# Patient Record
Sex: MALE | Race: WHITE | HISPANIC OR LATINO | Employment: OTHER | URBAN - METROPOLITAN AREA
[De-identification: names, ages, dates, MRNs, and addresses within clinical notes are randomized per-mention and may not be internally consistent; named-entity substitution may affect disease eponyms.]

---

## 2023-02-07 PROBLEM — R63.5 ABNORMAL WEIGHT GAIN: Status: ACTIVE | Noted: 2023-02-07

## 2023-02-07 PROBLEM — I10 HYPERTENSION: Status: ACTIVE | Noted: 2023-02-07

## 2023-02-07 PROBLEM — E11.9 TYPE 2 DIABETES MELLITUS WITHOUT COMPLICATION, WITHOUT LONG-TERM CURRENT USE OF INSULIN (MULTI): Status: ACTIVE | Noted: 2023-02-07

## 2023-02-07 PROBLEM — R32 INCONTINENCE: Status: ACTIVE | Noted: 2023-02-07

## 2023-02-07 PROBLEM — I48.0 PAROXYSMAL ATRIAL FIBRILLATION WITH RVR (MULTI): Status: ACTIVE | Noted: 2023-02-07

## 2023-02-07 PROBLEM — R82.89 ABNORMAL URINE CYTOLOGY: Status: ACTIVE | Noted: 2023-02-07

## 2023-02-07 PROBLEM — E78.00 HYPERCHOLESTEROLEMIA: Status: ACTIVE | Noted: 2023-02-07

## 2023-02-07 PROBLEM — M54.50 ACUTE LEFT-SIDED LOW BACK PAIN WITHOUT SCIATICA: Status: ACTIVE | Noted: 2023-02-07

## 2023-02-07 PROBLEM — N18.30 CONTROLLED TYPE 2 DIABETES MELLITUS WITH STAGE 3 CHRONIC KIDNEY DISEASE (MULTI): Status: ACTIVE | Noted: 2023-02-07

## 2023-02-07 PROBLEM — J20.8 ACUTE BRONCHITIS DUE TO OTHER SPECIFIED ORGANISMS: Status: ACTIVE | Noted: 2023-02-07

## 2023-02-07 PROBLEM — D64.9 NORMOCHROMIC NORMOCYTIC ANEMIA: Status: ACTIVE | Noted: 2023-02-07

## 2023-02-07 PROBLEM — E11.22 CONTROLLED TYPE 2 DIABETES MELLITUS WITH STAGE 3 CHRONIC KIDNEY DISEASE (MULTI): Status: ACTIVE | Noted: 2023-02-07

## 2023-02-07 RX ORDER — DILTIAZEM HYDROCHLORIDE 300 MG/1
1 CAPSULE, EXTENDED RELEASE ORAL DAILY
COMMUNITY
Start: 2021-02-12

## 2023-02-07 RX ORDER — LISINOPRIL 5 MG/1
1 TABLET ORAL DAILY
COMMUNITY
Start: 2019-06-19 | End: 2023-11-02 | Stop reason: SDUPTHER

## 2023-02-07 RX ORDER — SIMVASTATIN 20 MG/1
1 TABLET, FILM COATED ORAL DAILY
COMMUNITY
Start: 2020-07-10 | End: 2023-05-02

## 2023-02-07 RX ORDER — OXYBUTYNIN CHLORIDE 5 MG/1
5 TABLET ORAL NIGHTLY
COMMUNITY
Start: 2022-07-05

## 2023-02-07 RX ORDER — OMEPRAZOLE 40 MG/1
CAPSULE, DELAYED RELEASE ORAL
COMMUNITY

## 2023-02-07 RX ORDER — TERAZOSIN 1 MG/1
1 CAPSULE ORAL NIGHTLY
COMMUNITY
Start: 2021-02-03 | End: 2023-12-29 | Stop reason: DRUGHIGH

## 2023-02-07 RX ORDER — METOPROLOL TARTRATE 50 MG/1
1 TABLET ORAL 2 TIMES DAILY
COMMUNITY
Start: 2022-07-28 | End: 2023-05-31

## 2023-02-07 RX ORDER — SPIRONOLACTONE 25 MG/1
1 TABLET ORAL DAILY
COMMUNITY
Start: 2017-02-07 | End: 2023-08-23

## 2023-02-07 RX ORDER — GLIPIZIDE 2.5 MG/1
2.5 TABLET, EXTENDED RELEASE ORAL
COMMUNITY
Start: 2019-09-27 | End: 2023-05-31

## 2023-02-07 RX ORDER — METFORMIN HYDROCHLORIDE 1000 MG/1
1 TABLET ORAL 2 TIMES DAILY
COMMUNITY
Start: 2021-11-09 | End: 2023-08-23

## 2023-02-07 RX ORDER — TROSPIUM CHLORIDE ER 60 MG/1
1 CAPSULE ORAL DAILY
COMMUNITY
Start: 2021-02-03 | End: 2023-04-23 | Stop reason: ALTCHOICE

## 2023-02-07 RX ORDER — TAMSULOSIN HYDROCHLORIDE 0.4 MG/1
1 CAPSULE ORAL DAILY
COMMUNITY
Start: 2021-02-03 | End: 2023-03-23

## 2023-03-20 ENCOUNTER — OFFICE VISIT (OUTPATIENT)
Dept: PRIMARY CARE | Facility: CLINIC | Age: 68
End: 2023-03-20
Payer: MEDICARE

## 2023-03-20 VITALS
SYSTOLIC BLOOD PRESSURE: 118 MMHG | BODY MASS INDEX: 39.37 KG/M2 | RESPIRATION RATE: 16 BRPM | WEIGHT: 245 LBS | HEIGHT: 66 IN | TEMPERATURE: 96.2 F | HEART RATE: 62 BPM | OXYGEN SATURATION: 97 % | DIASTOLIC BLOOD PRESSURE: 70 MMHG

## 2023-03-20 DIAGNOSIS — I48.0 PAROXYSMAL ATRIAL FIBRILLATION WITH RVR (MULTI): ICD-10-CM

## 2023-03-20 DIAGNOSIS — R20.2 TINGLING OF BOTH FEET: ICD-10-CM

## 2023-03-20 DIAGNOSIS — N18.30 CONTROLLED TYPE 2 DIABETES MELLITUS WITH STAGE 3 CHRONIC KIDNEY DISEASE, WITHOUT LONG-TERM CURRENT USE OF INSULIN (MULTI): Primary | ICD-10-CM

## 2023-03-20 DIAGNOSIS — E78.00 HYPERCHOLESTEROLEMIA: ICD-10-CM

## 2023-03-20 DIAGNOSIS — E11.22 CONTROLLED TYPE 2 DIABETES MELLITUS WITH STAGE 3 CHRONIC KIDNEY DISEASE, WITHOUT LONG-TERM CURRENT USE OF INSULIN (MULTI): Primary | ICD-10-CM

## 2023-03-20 DIAGNOSIS — I10 PRIMARY HYPERTENSION: ICD-10-CM

## 2023-03-20 PROCEDURE — 1160F RVW MEDS BY RX/DR IN RCRD: CPT | Performed by: FAMILY MEDICINE

## 2023-03-20 PROCEDURE — 1159F MED LIST DOCD IN RCRD: CPT | Performed by: FAMILY MEDICINE

## 2023-03-20 PROCEDURE — 3074F SYST BP LT 130 MM HG: CPT | Performed by: FAMILY MEDICINE

## 2023-03-20 PROCEDURE — 4010F ACE/ARB THERAPY RXD/TAKEN: CPT | Performed by: FAMILY MEDICINE

## 2023-03-20 PROCEDURE — 1036F TOBACCO NON-USER: CPT | Performed by: FAMILY MEDICINE

## 2023-03-20 PROCEDURE — 3078F DIAST BP <80 MM HG: CPT | Performed by: FAMILY MEDICINE

## 2023-03-20 PROCEDURE — 99214 OFFICE O/P EST MOD 30 MIN: CPT | Performed by: FAMILY MEDICINE

## 2023-03-20 NOTE — PROGRESS NOTES
Subjective   Patient ID: Johnathan Akins is a 68 y.o. male who presents for Diabetes (Follow up).  HPI  Pleasant 68-year-old gent with a history of paroxysmal atrial fibrillation hypertension very well on Xarelto as well as his diltiazem and metoprolol.  Remains also on Aldactone because prior gout for peripheral edema is done very well also takes metformin twice a day as well as 3 times a day 2.5 mg of glipizide.  Otherwise his diet is been fairly good improved his low-salt low carbohydrate and low-fat diet.  Follow-up in cardiology and otherwise has some tingling in his feet we did go ahead and update this metabolic work-up with B12 and TSH in addition to checking his CMP and A1c before next visit in 3 months.  The patient is trying to lose weight.  He is aware of his controlled A1c and normal CMP otherwise.  Is been stable PSAs been stable is also followed by urologist who has him on Hytrin and also Butin at nighttime to help with bladder spasm and nocturia.  Otherwise chronic meds reviewed no reported side effects  No recent chest pain shortness of palpitations visual change foot change or new GI- issues.  Reviewed medicines with spouse as well as patient today as well as need for ongoing low-salt low carbohydrate and low-fat diet also takes statin therapy  Review of Systems   Constitutional:  Negative for appetite change and fatigue.   Eyes:  Negative for visual disturbance.   Respiratory:  Negative for cough, chest tightness and shortness of breath.    Cardiovascular:  Negative for chest pain, palpitations and leg swelling.   Gastrointestinal:  Negative for abdominal pain, blood in stool and vomiting.   Genitourinary:  Positive for frequency. Negative for dysuria, flank pain, hematuria and testicular pain.   Musculoskeletal:  Positive for arthralgias and back pain.   Skin:  Negative for rash.   Neurological:  Positive for numbness. Negative for speech difficulty, weakness and headaches.  "  Psychiatric/Behavioral:  Negative for behavioral problems, decreased concentration and sleep disturbance.        Objective   /70 (BP Location: Right arm, Patient Position: Sitting, BP Cuff Size: Large adult)   Pulse 62   Temp 35.7 °C (96.2 °F) (Temporal)   Resp 16   Ht 1.676 m (5' 6\")   Wt 111 kg (245 lb)   SpO2 97%   BMI 39.54 kg/m²   02/06/23 1824Prostate Specific Antigen   Collected: 02/06/23 1112  Final result   PSA 0.50 ng/mL      02/06/23 1725Cholesterol, LDL Direct   Collected: 02/06/23 1112  Final result   LDL Direct 89 mg/dL      02/06/23 1602Albumin , Urine Random   Collected: 02/06/23 1112  Final result   ALBUMIN (MG/L) IN URINE <7.0 mg/L Creatinine, Urine 93.6 mg/dL   Albumin/Creatine Ratio SEE COMMENT ug/mg crt      02/06/23 1534Comprehensive Metabolic Panel   Collected: 02/06/23 1112  Final result   Glucose 80 mg/dL GFR MALE 61 mL/min/1.73m2    Sodium 132 Low  mmol/L Calcium 9.4 mg/dL   Potassium 4.9 mmol/L Albumin 3.7 g/dL   Chloride 100 mmol/L Alkaline Phosphatase 74 U/L   Bicarbonate 24 mmol/L Total Protein 7.1 g/dL   Anion Gap 13 mmol/L AST 12 U/L   Urea Nitrogen 19 mg/dL Total Bilirubin 0.5 mg/dL   Creatinine 1.26 mg/dL ALT (SGPT) 11 U/L    02/06/23 1452Urinalysis with Reflex Microscopic   Collected: 02/06/23 1112  Final result   Color, Urine YELLOW Blood, Urine NEGATIVE   Appearance, Urine CLEAR Ketones, Urine NEGATIVE mg/dL   Specific Gravity, Urine 1.016 Bilirubin, Urine NEGATIVE   pH, Urine 6.0 Urobilinogen, Urine <2.0 mg/dL   Protein, Urine NEGATIVE mg/dL Nitrite, Urine NEGATIVE   Glucose, Urine NEGATIVE mg/dL Leukocyte Esterase, Urine NEGATIVE   02/06/23 1423URINE SPECIMEN TRACKING TO CYTOLOGY OR SURG PATH - DATA CONVN   Collected: 02/06/23 1112  Final result   Urine Specimen Tracking to Cytology or Surg Path Collected     07/12/22 1435Urinalysis with Reflex Microscopic   Collected: 07/12/22 1051  Final result   Color, Urine YELLOW Blood, Urine NEGATIVE   Appearance, " Urine CLEAR Ketones, Urine NEGATIVE mg/dL   Specific Gravity, Urine 1.017 Bilirubin, Urine NEGATIVE   pH, Urine 5.0 Urobilinogen, Urine <2.0 mg/dL   Protein, Urine NEGATIVE mg/dL Nitrite, Urine NEGATIVE   Glucose, Urine NEGATIVE mg/dL Leukocyte Esterase, Urine NEGATIVE   07/12/22 1051URINE SPECIMEN TRACKING TO CYTOLOGY OR SURG PATH - DATA CONVN   Collected: 07/12/22 1051  Final result   Urine Specimen Tracking to Cytology or Surg Path Collected     04/08/22 1226Hemoglobin A1C   Collected: 04/08/22 0845  Final result   Hemoglobin A1C 6.2 Abnormal  %  Estimated Average Glucose 131 MG/DL   04/08/22 0927Basic Metabolic Panel   Collected: 04/08/22 0845  Final result   Glucose 121 High  mg/dL Anion Gap 14 mmol/L   Sodium 134 Low  mmol/L Urea Nitrogen 24 High  mg/dL   Potassium 5.0 mmol/L Creatinine 1.24 mg/dL   Chloride 102 mmol/L GFR MALE 63 mL/min/1.73m2    Bicarbonate 23 mmol/L Calcium 9.3 mg/dL           Physical Exam  Vital signs reviewed and normal  Eyes eyes show PERRLA bilaterally muscle tracking is normal no parable edema  Neck neck is supple no masses adenopathy bruits or rigidity thyroid is normal  Cardiovascular RSR without significant murmurs gallop or ectopy  Respiratory-clear anteriorly and posteriorly  Abdominal no organomegaly mass or rebound bowel sounds are normal no CVA tenderness  Peripheral vascular no symmetric or asymmetric edema minimally reduced sensation to vibration but otherwise sensation to touch is intact deficits noted  Musculoskeletal moves upper and lower extremities well without acute joint swelling or muscle tenderness  Skin-no rash petechiae or jaundice  Mood-mood is stable without anxiety depressive or cognitive issues  Accu-Cheks reviewed recent sugars normal as well as remaining CMP earlier PSA and PSA were stable.      Assessment/Plan   Problem List Items Addressed This Visit    None  Above lab.  We will continue above cardiac lipid as well as diabetic medicines continue  low-salt low carbohydrate and low-fat diet  We will come in in about 3 months during his Accu-Cheks and also check previsit B12 TSH in view of burning feet.  We will also check A1c and fasting CMP.  All question were addressed for did review diabetic goals diabetic diet compliance.  Otherwise clinically stable from a cardiopulmonary standpoint we will continue his Xarelto and his beta-blocker and diltiazem follow-up with cardiology at their direction  continue current medications~  diet- decrease carbohydrates and sugar intake~  increase exercise as tolerated~  stay up to date with eye and foot exams~  call if any questions or concerns~follow up in 3-6 months~   @discharge  The above diagnosis and treatment plan was discussed with the patient patient will continue appropriate diet and exercise as reviewed  Patient will recheck earlier if any interval problems of significance or clinical worsening of the above problems.  Agrees above surveillance.  All question were addressed regarding above meds

## 2023-03-23 PROBLEM — R20.2 TINGLING OF BOTH FEET: Status: ACTIVE | Noted: 2023-03-23

## 2023-03-23 PROBLEM — J20.8 ACUTE BRONCHITIS DUE TO OTHER SPECIFIED ORGANISMS: Status: RESOLVED | Noted: 2023-02-07 | Resolved: 2023-03-23

## 2023-03-23 ASSESSMENT — ENCOUNTER SYMPTOMS
NUMBNESS: 1
BACK PAIN: 1
HEADACHES: 0
BLOOD IN STOOL: 0
FREQUENCY: 1
SPEECH DIFFICULTY: 0
ARTHRALGIAS: 1
FLANK PAIN: 0
APPETITE CHANGE: 0
SLEEP DISTURBANCE: 0
CHEST TIGHTNESS: 0
DYSURIA: 0
WEAKNESS: 0
ABDOMINAL PAIN: 0
COUGH: 0
DECREASED CONCENTRATION: 0
PALPITATIONS: 0
SHORTNESS OF BREATH: 0
HEMATURIA: 0
VOMITING: 0
FATIGUE: 0

## 2023-03-29 LAB
CREATININE (MG/DL) IN SER/PLAS: 1.2 MG/DL (ref 0.5–1.3)
GFR MALE: 65 ML/MIN/1.73M2

## 2023-04-18 ENCOUNTER — LAB (OUTPATIENT)
Dept: LAB | Facility: LAB | Age: 68
End: 2023-04-18
Payer: MEDICARE

## 2023-04-18 DIAGNOSIS — I48.0 PAROXYSMAL ATRIAL FIBRILLATION WITH RVR (MULTI): ICD-10-CM

## 2023-04-18 DIAGNOSIS — R20.2 TINGLING OF BOTH FEET: ICD-10-CM

## 2023-04-18 DIAGNOSIS — E11.22 CONTROLLED TYPE 2 DIABETES MELLITUS WITH STAGE 3 CHRONIC KIDNEY DISEASE, WITHOUT LONG-TERM CURRENT USE OF INSULIN (MULTI): ICD-10-CM

## 2023-04-18 DIAGNOSIS — N18.30 CONTROLLED TYPE 2 DIABETES MELLITUS WITH STAGE 3 CHRONIC KIDNEY DISEASE, WITHOUT LONG-TERM CURRENT USE OF INSULIN (MULTI): ICD-10-CM

## 2023-04-18 LAB
ALANINE AMINOTRANSFERASE (SGPT) (U/L) IN SER/PLAS: 11 U/L (ref 10–52)
ALBUMIN (G/DL) IN SER/PLAS: 4 G/DL (ref 3.4–5)
ALKALINE PHOSPHATASE (U/L) IN SER/PLAS: 85 U/L (ref 33–136)
ANION GAP IN SER/PLAS: 13 MMOL/L (ref 10–20)
ASPARTATE AMINOTRANSFERASE (SGOT) (U/L) IN SER/PLAS: 10 U/L (ref 9–39)
BILIRUBIN TOTAL (MG/DL) IN SER/PLAS: 0.4 MG/DL (ref 0–1.2)
CALCIUM (MG/DL) IN SER/PLAS: 9.4 MG/DL (ref 8.6–10.3)
CARBON DIOXIDE, TOTAL (MMOL/L) IN SER/PLAS: 25 MMOL/L (ref 21–32)
CHLORIDE (MMOL/L) IN SER/PLAS: 102 MMOL/L (ref 98–107)
COBALAMIN (VITAMIN B12) (PG/ML) IN SER/PLAS: 238 PG/ML (ref 211–911)
CREATININE (MG/DL) IN SER/PLAS: 1.18 MG/DL (ref 0.5–1.3)
ESTIMATED AVERAGE GLUCOSE FOR HBA1C: 137 MG/DL
GFR MALE: 66 ML/MIN/1.73M2
GLUCOSE (MG/DL) IN SER/PLAS: 102 MG/DL (ref 74–99)
HEMOGLOBIN A1C/HEMOGLOBIN TOTAL IN BLOOD: 6.4 %
POTASSIUM (MMOL/L) IN SER/PLAS: 5.1 MMOL/L (ref 3.5–5.3)
PROTEIN TOTAL: 6.7 G/DL (ref 6.4–8.2)
SODIUM (MMOL/L) IN SER/PLAS: 135 MMOL/L (ref 136–145)
THYROTROPIN (MIU/L) IN SER/PLAS BY DETECTION LIMIT <= 0.05 MIU/L: 1.83 MIU/L (ref 0.44–3.98)
UREA NITROGEN (MG/DL) IN SER/PLAS: 17 MG/DL (ref 6–23)

## 2023-04-18 PROCEDURE — 36415 COLL VENOUS BLD VENIPUNCTURE: CPT

## 2023-04-18 PROCEDURE — 84443 ASSAY THYROID STIM HORMONE: CPT

## 2023-04-18 PROCEDURE — 83036 HEMOGLOBIN GLYCOSYLATED A1C: CPT

## 2023-04-18 PROCEDURE — 80053 COMPREHEN METABOLIC PANEL: CPT

## 2023-04-18 PROCEDURE — 82607 VITAMIN B-12: CPT

## 2023-04-19 ENCOUNTER — OFFICE VISIT (OUTPATIENT)
Dept: PRIMARY CARE | Facility: CLINIC | Age: 68
End: 2023-04-19
Payer: MEDICARE

## 2023-04-19 VITALS
HEIGHT: 66 IN | RESPIRATION RATE: 16 BRPM | OXYGEN SATURATION: 97 % | HEART RATE: 62 BPM | SYSTOLIC BLOOD PRESSURE: 118 MMHG | BODY MASS INDEX: 39.21 KG/M2 | WEIGHT: 244 LBS | DIASTOLIC BLOOD PRESSURE: 62 MMHG | TEMPERATURE: 97.3 F

## 2023-04-19 DIAGNOSIS — E78.00 HYPERCHOLESTEROLEMIA: ICD-10-CM

## 2023-04-19 DIAGNOSIS — I10 PRIMARY HYPERTENSION: ICD-10-CM

## 2023-04-19 DIAGNOSIS — E11.22 CONTROLLED TYPE 2 DIABETES MELLITUS WITH STAGE 3 CHRONIC KIDNEY DISEASE, WITHOUT LONG-TERM CURRENT USE OF INSULIN (MULTI): Primary | ICD-10-CM

## 2023-04-19 DIAGNOSIS — I48.0 PAROXYSMAL ATRIAL FIBRILLATION WITH RVR (MULTI): ICD-10-CM

## 2023-04-19 DIAGNOSIS — N18.30 CONTROLLED TYPE 2 DIABETES MELLITUS WITH STAGE 3 CHRONIC KIDNEY DISEASE, WITHOUT LONG-TERM CURRENT USE OF INSULIN (MULTI): Primary | ICD-10-CM

## 2023-04-19 PROCEDURE — 1159F MED LIST DOCD IN RCRD: CPT | Performed by: FAMILY MEDICINE

## 2023-04-19 PROCEDURE — 3078F DIAST BP <80 MM HG: CPT | Performed by: FAMILY MEDICINE

## 2023-04-19 PROCEDURE — 3044F HG A1C LEVEL LT 7.0%: CPT | Performed by: FAMILY MEDICINE

## 2023-04-19 PROCEDURE — 3074F SYST BP LT 130 MM HG: CPT | Performed by: FAMILY MEDICINE

## 2023-04-19 PROCEDURE — 1160F RVW MEDS BY RX/DR IN RCRD: CPT | Performed by: FAMILY MEDICINE

## 2023-04-19 PROCEDURE — 1036F TOBACCO NON-USER: CPT | Performed by: FAMILY MEDICINE

## 2023-04-19 PROCEDURE — 99213 OFFICE O/P EST LOW 20 MIN: CPT | Performed by: FAMILY MEDICINE

## 2023-04-19 PROCEDURE — 4010F ACE/ARB THERAPY RXD/TAKEN: CPT | Performed by: FAMILY MEDICINE

## 2023-04-19 ASSESSMENT — PATIENT HEALTH QUESTIONNAIRE - PHQ9
2. FEELING DOWN, DEPRESSED OR HOPELESS: NOT AT ALL
SUM OF ALL RESPONSES TO PHQ9 QUESTIONS 1 AND 2: 0
1. LITTLE INTEREST OR PLEASURE IN DOING THINGS: NOT AT ALL

## 2023-04-19 ASSESSMENT — ENCOUNTER SYMPTOMS
OCCASIONAL FEELINGS OF UNSTEADINESS: 0
DEPRESSION: 0
LOSS OF SENSATION IN FEET: 0

## 2023-04-19 NOTE — PROGRESS NOTES
Subjective   Patient ID: Johnathan Akins is a 68 y.o. male who presents for Diabetes and Follow-up.  HPI  68-year-old gent with a history of paroxysmal atrial fibrillation hypertension dyslipidemia diabetes here to recheck.  He is done very nicely with weight loss as well as glipizide 2.53 times if not 4 times a day as well as metformin twice a day.  He also takes his Xarelto in addition to his Aldactone and lisinopril and diltiazem for chronic atrial fibrillation.  Is followed by cardiology regularly.  Also followed by urology for intermittent hematuria.  It may be scheduled for TUR if needed but otherwise remains on Flomax.  The patient denies any chest pain shortness breath palpitations or new GI- issues.  Chronic meds reviewed.  No reported side effects  Recent sugar stable and as well as A1c at 6.4 liver functions normal and kidney functions are now normal.  His PSA followed by Dr. Patten  Recent TSH was normal  Thyroid recently was normal but B12 was low normal is now on 1000 mcg sublingual daily.  Otherwise remains fairly active without exertional resting chest pain or shortness of breath or palpitations    Lab Results   Component Value Date    HGBA1C 6.4 (A) 04/18/2023    HGBA1C 6.2 (A) 04/08/2022    HGBA1C 6.2 06/09/2021     Lab Results   Component Value Date    CREATININE 1.18 04/18/2023       Review of Systems   Constitutional:  Negative for fatigue and fever.   Eyes:  Negative for visual disturbance.   Respiratory:  Negative for cough, chest tightness and shortness of breath.    Cardiovascular:  Negative for chest pain, palpitations and leg swelling.   Gastrointestinal:  Negative for abdominal pain and blood in stool.   Genitourinary:  Positive for frequency and urgency. Negative for dysuria and hematuria.   Musculoskeletal:  Positive for arthralgias. Negative for myalgias.   Skin:  Negative for rash.   Neurological:  Negative for dizziness, weakness, light-headedness and headaches.  "  Hematological:  Negative for adenopathy.   Psychiatric/Behavioral:  Negative for behavioral problems and sleep disturbance.        Objective   /62 (BP Location: Right arm, Patient Position: Sitting, BP Cuff Size: Large adult)   Pulse 62   Temp 36.3 °C (97.3 °F) (Temporal)   Resp 16   Ht 1.676 m (5' 6\")   Wt 111 kg (244 lb)   SpO2 97%   BMI 39.38 kg/m²   1822Hemoglobin A1c   Collected: 04/18/23 1102  Final result  Specimen: Blood, Venous   Hemoglobin A1C 6.4 Abnormal  %  Estimated Average Glucose 137 MG/DL   04/18/23 1704Vitamin B12   Collected: 04/18/23 1102  Final result  Specimen: Blood, Venous   Vitamin B-12 238 pg/mL     04/18/23 1651TSH   Collected: 04/18/23 1102  Final result  Specimen: Blood, Venous   TSH 1.83 mIU/L      04/18/23 1649Comprehensive metabolic panel   Collected: 04/18/23 1102  Final result  Specimen: Blood, Venous   Glucose 102 High  mg/dL GFR MALE 66 mL/min/1.73m2    Sodium 135 Low  mmol/L Calcium 9.4 mg/dL   Potassium 5.1 mmol/L Albumin 4.0 g/dL   Chloride 102 mmol/L Alkaline Phosphatase 85 U/L   Bicarbonate 25 mmol/L Total Protein 6.7 g/dL   Anion Gap 13 mmol/L AST 10 U/L   Urea Nitrogen 17 mg/dL Total Bilirubin 0.4 mg/dL   Creatinine 1.18 mg/dL ALT (SGPT) 11 U/L            Physical Exam  Vital signs reviewed and are normal  General-inspection of the well-developed mildly overweight individual in no acute distress  Neck neck is all without masses adenopathy bruits or rigidity  Respiratory-chest clear anteriorly and posteriorly  Cardiovascular regular sinus rhythm today without any ectopy.  Peripheral vascular no discrete motor sensorivascular deficits no symmetric or asymmetric edema  Mood-mood is stable without anxiety depressive or cognitive issues  Neurologic-no cranial nerve deficits no focal deficit upper or lower extremities      Assessment/Plan   Problem List Items Addressed This Visit    None  Low normal B12 and will take sublingual B12 1000 mcg daily  We will " continue his above antihypertensive medicines as well as his Xarelto and also diabetic medicine  He is aware to continue weight loss maneuvers as well as low-salt low carbohydrate and low-fat diet  Follow-up with urology for urinary retention with possible TUR otherwise patient's A1c is stable at 6.4.  Thyroid normal kidney functions are now normal on his diuretic.  We will follow-up in 3 to 4 months and we will bring in his Accu-Cheks and will try to check now before supper before breakfast Accu-Chek been fairly stable up to about 145 250 depend on his evening diet.  @discharge  The above diagnosis and treatment plan was discussed with the patient patient will continue appropriate diet and exercise as reviewed  Patient will recheck earlier if any interval problems of significance or clinical worsening of the above problems.  Agrees above surveillance.  All question were addressed regarding above meds

## 2023-04-23 ASSESSMENT — ENCOUNTER SYMPTOMS
PALPITATIONS: 0
HEADACHES: 0
SHORTNESS OF BREATH: 0
MYALGIAS: 0
ABDOMINAL PAIN: 0
DIZZINESS: 0
DYSURIA: 0
ADENOPATHY: 0
WEAKNESS: 0
ARTHRALGIAS: 1
FEVER: 0
BLOOD IN STOOL: 0
FREQUENCY: 1
COUGH: 0
LIGHT-HEADEDNESS: 0
HEMATURIA: 0
CHEST TIGHTNESS: 0
SLEEP DISTURBANCE: 0
FATIGUE: 0

## 2023-05-01 DIAGNOSIS — E78.00 HYPERCHOLESTEROLEMIA: ICD-10-CM

## 2023-05-02 RX ORDER — SIMVASTATIN 20 MG/1
TABLET, FILM COATED ORAL
Qty: 90 TABLET | Refills: 3 | Status: SHIPPED | OUTPATIENT
Start: 2023-05-02 | End: 2024-04-09

## 2023-05-29 DIAGNOSIS — N18.30 CONTROLLED TYPE 2 DIABETES MELLITUS WITH STAGE 3 CHRONIC KIDNEY DISEASE, WITHOUT LONG-TERM CURRENT USE OF INSULIN (MULTI): ICD-10-CM

## 2023-05-29 DIAGNOSIS — E11.22 CONTROLLED TYPE 2 DIABETES MELLITUS WITH STAGE 3 CHRONIC KIDNEY DISEASE, WITHOUT LONG-TERM CURRENT USE OF INSULIN (MULTI): ICD-10-CM

## 2023-05-29 DIAGNOSIS — I10 PRIMARY HYPERTENSION: ICD-10-CM

## 2023-05-31 RX ORDER — GLIPIZIDE 2.5 MG/1
TABLET, EXTENDED RELEASE ORAL
Qty: 360 TABLET | Refills: 3 | Status: SHIPPED | OUTPATIENT
Start: 2023-05-31 | End: 2024-05-08

## 2023-05-31 RX ORDER — METOPROLOL TARTRATE 50 MG/1
TABLET ORAL
Qty: 180 TABLET | Refills: 3 | Status: SHIPPED | OUTPATIENT
Start: 2023-05-31 | End: 2024-05-08

## 2023-07-10 ENCOUNTER — APPOINTMENT (OUTPATIENT)
Dept: PRIMARY CARE | Facility: CLINIC | Age: 68
End: 2023-07-10
Payer: MEDICARE

## 2023-08-22 DIAGNOSIS — N18.30 CONTROLLED TYPE 2 DIABETES MELLITUS WITH STAGE 3 CHRONIC KIDNEY DISEASE, WITHOUT LONG-TERM CURRENT USE OF INSULIN (MULTI): ICD-10-CM

## 2023-08-22 DIAGNOSIS — E11.22 CONTROLLED TYPE 2 DIABETES MELLITUS WITH STAGE 3 CHRONIC KIDNEY DISEASE, WITHOUT LONG-TERM CURRENT USE OF INSULIN (MULTI): ICD-10-CM

## 2023-08-22 DIAGNOSIS — I10 PRIMARY HYPERTENSION: ICD-10-CM

## 2023-08-23 RX ORDER — SPIRONOLACTONE 25 MG/1
25 TABLET ORAL DAILY
Qty: 90 TABLET | Refills: 3 | Status: SHIPPED | OUTPATIENT
Start: 2023-08-23

## 2023-08-23 RX ORDER — METFORMIN HYDROCHLORIDE 1000 MG/1
1000 TABLET ORAL 2 TIMES DAILY
Qty: 180 TABLET | Refills: 3 | Status: SHIPPED | OUTPATIENT
Start: 2023-08-23

## 2023-09-13 ENCOUNTER — OFFICE VISIT (OUTPATIENT)
Dept: PRIMARY CARE | Facility: CLINIC | Age: 68
End: 2023-09-13
Payer: MEDICARE

## 2023-09-13 VITALS
OXYGEN SATURATION: 97 % | RESPIRATION RATE: 16 BRPM | HEART RATE: 63 BPM | HEIGHT: 66 IN | BODY MASS INDEX: 30.53 KG/M2 | TEMPERATURE: 97.5 F | SYSTOLIC BLOOD PRESSURE: 108 MMHG | DIASTOLIC BLOOD PRESSURE: 66 MMHG | WEIGHT: 190 LBS

## 2023-09-13 DIAGNOSIS — I48.0 PAROXYSMAL ATRIAL FIBRILLATION WITH RVR (MULTI): ICD-10-CM

## 2023-09-13 DIAGNOSIS — E11.22 CONTROLLED TYPE 2 DIABETES MELLITUS WITH STAGE 3 CHRONIC KIDNEY DISEASE, WITHOUT LONG-TERM CURRENT USE OF INSULIN (MULTI): ICD-10-CM

## 2023-09-13 DIAGNOSIS — K92.1 HEMATOCHEZIA: ICD-10-CM

## 2023-09-13 DIAGNOSIS — N30.00 ACUTE CYSTITIS WITHOUT HEMATURIA: Primary | ICD-10-CM

## 2023-09-13 DIAGNOSIS — N18.30 CONTROLLED TYPE 2 DIABETES MELLITUS WITH STAGE 3 CHRONIC KIDNEY DISEASE, WITHOUT LONG-TERM CURRENT USE OF INSULIN (MULTI): ICD-10-CM

## 2023-09-13 DIAGNOSIS — I10 PRIMARY HYPERTENSION: ICD-10-CM

## 2023-09-13 DIAGNOSIS — K59.1 FUNCTIONAL DIARRHEA: ICD-10-CM

## 2023-09-13 DIAGNOSIS — R35.0 INCREASED URINARY FREQUENCY: ICD-10-CM

## 2023-09-13 LAB
POC APPEARANCE, URINE: CLEAR
POC BILIRUBIN, URINE: NEGATIVE
POC BLOOD, URINE: NEGATIVE
POC COLOR, URINE: YELLOW
POC GLUCOSE, URINE: NEGATIVE MG/DL
POC KETONES, URINE: NEGATIVE MG/DL
POC LEUKOCYTES, URINE: ABNORMAL
POC NITRITE,URINE: NEGATIVE
POC PH, URINE: 5.5 PH
POC PROTEIN, URINE: NEGATIVE MG/DL
POC SPECIFIC GRAVITY, URINE: 1.02
POC UROBILINOGEN, URINE: 0.2 EU/DL

## 2023-09-13 PROCEDURE — 1036F TOBACCO NON-USER: CPT | Performed by: FAMILY MEDICINE

## 2023-09-13 PROCEDURE — 1159F MED LIST DOCD IN RCRD: CPT | Performed by: FAMILY MEDICINE

## 2023-09-13 PROCEDURE — 3044F HG A1C LEVEL LT 7.0%: CPT | Performed by: FAMILY MEDICINE

## 2023-09-13 PROCEDURE — 3074F SYST BP LT 130 MM HG: CPT | Performed by: FAMILY MEDICINE

## 2023-09-13 PROCEDURE — 99214 OFFICE O/P EST MOD 30 MIN: CPT | Performed by: FAMILY MEDICINE

## 2023-09-13 PROCEDURE — 87086 URINE CULTURE/COLONY COUNT: CPT

## 2023-09-13 PROCEDURE — 1160F RVW MEDS BY RX/DR IN RCRD: CPT | Performed by: FAMILY MEDICINE

## 2023-09-13 PROCEDURE — 3078F DIAST BP <80 MM HG: CPT | Performed by: FAMILY MEDICINE

## 2023-09-13 PROCEDURE — 4010F ACE/ARB THERAPY RXD/TAKEN: CPT | Performed by: FAMILY MEDICINE

## 2023-09-13 PROCEDURE — 81003 URINALYSIS AUTO W/O SCOPE: CPT | Performed by: FAMILY MEDICINE

## 2023-09-13 RX ORDER — CIPROFLOXACIN 500 MG/1
500 TABLET ORAL
Qty: 20 TABLET | Refills: 0 | Status: SHIPPED | OUTPATIENT
Start: 2023-09-13 | End: 2023-09-23

## 2023-09-13 NOTE — PROGRESS NOTES
Subjective   Patient ID: Johnathan Akins is a 68 y.o. male who presents for UTI (Increased urinary frequency) and Diarrhea.  HPI  Generally here with 2 agendas 1 is unusual polyuria mild dysuria at the end of the voiding for last 5 to 7 days history of BPH no testicular pain but more frequent urination and change in odor and reduced volume.  Secondly has had some loose stool and occasional blood-streaked stool he has had a colonoscopy many years ago but without significant abdominal pain and new hematochezia, suggest that the colonoscopy to help clarify the etiology of this to which she concurs  We did review his multiple blood pressure medicines including Hytrin at night for his BPH and also known for his peripheral swelling also takes a combination metoprolol and diltiazem for remote atrial fibrillation and hypertension.  He also takes Toprol and Xarelto.  Takes 2.5 mg glipizide 1 before breakfast and 2 before supper as well as metformin 1000 mg twice a day.  Menses have been stable as well as his sugar kidney functions and liver functions.  Review of Systems   Constitutional:  Positive for fatigue. Negative for fever and unexpected weight change.   Eyes:  Negative for visual disturbance.   Respiratory:  Negative for cough, chest tightness, shortness of breath and wheezing.    Cardiovascular:  Negative for chest pain, palpitations and leg swelling.   Gastrointestinal:  Positive for blood in stool and diarrhea. Negative for abdominal distention, abdominal pain, nausea and vomiting.   Genitourinary:  Positive for decreased urine volume, dysuria, frequency and urgency. Negative for flank pain and hematuria.   Musculoskeletal:  Positive for arthralgias and back pain. Negative for myalgias.   Skin:  Negative for rash.   Neurological:  Negative for weakness, light-headedness and headaches.   Hematological:  Negative for adenopathy.   Psychiatric/Behavioral:  Negative for behavioral problems and sleep disturbance.   "      Objective   /66 (BP Location: Right arm, Patient Position: Sitting, BP Cuff Size: Large adult)   Pulse 63   Temp 36.4 °C (97.5 °F) (Temporal)   Resp 16   Ht 1.676 m (5' 6\")   Wt 86.2 kg (190 lb)   SpO2 97%   BMI 30.67 kg/m²          Component Ref Range & Units 11:16   POC Color, Urine Straw, Yellow, Light Yellow Yellow   POC Appearance, Urine Clear Clear   POC Specific Gravity, Urine 1.005 - 1.035 1.025   POC PH, Urine No Reference Range Established PH 5.5   POC Protein, Urine NEGATIVE, 30 (1+) mg/dl NEGATIVE   POC Glucose, Urine NEGATIVE mg/dl NEGATIVE   POC Blood, Urine NEGATIVE NEGATIVE   POC Ketones, Urine NEGATIVE mg/dl NEGATIVE   POC Bilirubin, Urine NEGATIVE NEGATIVE   POC Urobilinogen, Urine 0.2, 1.0 EU/DL 0.2   Poc Nitrate, Urine NEGATIVE NEGATIVE   POC Leukocytes, Urine NEGATIVE SMALL (1+) Abnormal               Specimen Collected: 09/13/23 11:16 Last Resulted: 09/13/23 11:16         Hemoglobin A1c  Order: [id number]  Status: Final result       Visible to patient: No (inaccessible in [facility])       Dx: Controlled type 2 diabetes mellitus w...    2 Result Notes       1 Follow-up Encounter       1  Topic           Component Ref Range & Units 5 mo ago 1 yr ago 2 yr ago 3 yr ago 4 yr ago   Hemoglobin A1C % 6.4 Abnormal  6.2 Abnormal  CM 6.2 CM 5.9 CM 6.6 CM   Comment:      Diagnosis of Diabetes-Adults   Non-Diabetic: < or = 5.6%   Increased risk for developing diabetes: 5.7-6.4%   Diagnostic of diabetes: > or = 6.5%  .       Monitoring of       Contains abnormal data Comprehensive metabolic panel  Order: [id number]  Status: Final result       Visible to patient: No (inaccessible in [facility])       Dx: Paroxysmal atrial fibrillation with R...    2 Result Notes       1 Follow-up Encounter             Component Ref Range & Units 5 mo ago  (4/18/23) 5 mo ago  (3/29/23) 7 mo ago  (2/6/23) 1 yr ago  (4/8/22) 2 yr ago  (6/9/21) 3 yr ago  (11/6/19) 4 yr ago  (5/30/19)   Glucose 74 - 99 mg/dL 102 " High   80 121 High  106 High  91 127 High    Sodium 136 - 145 mmol/L 135 Low   132 Low  134 Low  136 136 135 Low    Potassium 3.5 - 5.3 mmol/L 5.1  4.9 5.0 4.7 5.1 4.7   Chloride 98 - 107 mmol/L 102  100 102 104 102 103   Bicarbonate 21 - 32 mmol/L 25  24 23 23 26 25   Anion Gap 10 - 20 mmol/L 13  13 14 14 13 12   Urea Nitrogen 6 - 23 mg/dL 17  19 24 High  20 17 17   Creatinine 0.50 - 1.30 mg/dL 1.18 1.20 1.26 1.24 1.17 1.13 1.19   GFR MALE >90 mL/min/1.73m2 66 65 CM 61 CM 63 CM      Comment:  CALCULATIONS OF ESTIMATED GFR ARE PERFORMED   USING THE 2021 CKD-EPI STUDY REFIT EQUATION   WITHOUT THE RACE VARIABLE FOR THE IDMS-TRACEABLE   CREATININE METHODS.    https://jasn.asnjournals.org/content/early/2021/09/22/ASN.0631946450   Calcium 8.6 - 10.3 mg/dL 9.4  9.4 9.3 9.1 9.8 9.6   Albumin 3.4 - 5.0 g/dL 4.0  3.7  4.1 4.1    Alkaline Phosphatase 33 - 136 U/L 85  74  82 94    Total Protein 6.4 - 8.2 g/dL 6.7  7.1  7.1 7.4    AST 9 - 39 U/L 10  12  12 16    Total Bilirubin 0.0 - 1.2 mg/dL 0.4  0.5  0.5 0.4    ALT (SGPT) 10 - 52 U/L 11  11 CM  13 CM 17 CM    Comment:  Patients joshua      TSH  Order: [id number]  Status: Final result       Visible to patient: No (inaccessible in [facility])       Dx: Paroxysmal atrial fibrillation with R...    2 Result Notes       1 Follow-up Encounter       Component Ref Range & Units 5 mo ago   TSH 0.44 - 3.98 mIU/L 1.83   Comment:  TSH testing is performed using different testing   methodology at [facility] than at other   [facility] hospitals. Direct result comparisons should   only be made within the same method.   Resulting Agency  [facility]                     Physical Exam    Reviewed and normal patient blood pressure is notably improved  General-inspection reveals an obese individual in no acute distress  Neck neck is supple without masses adenopathy bruits or rigidity  Cardiovascular-soft grade 1/2 over 6 systolic ejection murmur otherwise no diastolic murmurs gallop or  ectopy  Pulmonary-chest clear anteriorly and posteriorly  Abdominal abdomen is obese with very minimal tenderness upon deep palpation left lower quadrant otherwise no rebound tenderness no mid or upper abdominal tenderness no organomegaly McBurney's point is unremarkable no CVA tenderness   prostate is slightly enlarged and left lobe of the prostate is much spongy urine more tender than the right however no nodularities were felt no additional masses felt  Peripheral vascular no symmetric or asymmetric edema no significant sensorimotor deficits.  Mood mood is stable anxiety depressive or cognitive issues  Skin no rash petechiae or jaundice  Urine urine shows pyuria but otherwise no hematuria.      Assessment/Plan   Problem List Items Addressed This Visit    None  Visit Diagnoses       Increased urinary frequency        Relevant Orders    POCT UA Automated manually resulted    Functional diarrhea            New onset hematochezia referred to GI for diagnostic colonoscopy secondly with left lobe inflammation prostatitis and also of cystitis will place on continued Hytrin at nighttime as well as Cipro for the next 14 days recheck in 3 weeks urine sent for culture  Continue the above good low-salt low carbohydrate low-fat diet as well as above cardiac medicines  Follow-up with cardiologist believes he is in regular sinus rhythm at this time with remote atrial fibs  Blood pressure is better with the above 4-5 antihypertensive medicines continue Xarelto recheck earlier if interval worsening is followed also by Dr. Patten for intermittent cystitis.  In liquid strongly urged above follow-up in 2 to 3 weeks for repeat urine twice continue his diabetic medicines diabetic diet  @discharge  The above diagnosis and treatment plan was discussed with the patient patient will continue appropriate diet and exercise as reviewed  Patient will recheck earlier if any interval problems of significance or clinical worsening of the  above problems.  Agrees above surveillance.  All question were addressed regarding above meds

## 2023-09-15 LAB — URINE CULTURE: NORMAL

## 2023-09-17 PROBLEM — R35.0 INCREASED URINARY FREQUENCY: Status: ACTIVE | Noted: 2023-09-17

## 2023-09-17 PROBLEM — N30.00 ACUTE CYSTITIS WITHOUT HEMATURIA: Status: ACTIVE | Noted: 2023-09-17

## 2023-09-17 PROBLEM — K92.1 HEMATOCHEZIA: Status: ACTIVE | Noted: 2023-09-17

## 2023-09-17 PROBLEM — K59.1 FUNCTIONAL DIARRHEA: Status: ACTIVE | Noted: 2023-09-17

## 2023-09-17 PROBLEM — R82.89 ABNORMAL URINE CYTOLOGY: Status: RESOLVED | Noted: 2023-02-07 | Resolved: 2023-09-17

## 2023-09-17 ASSESSMENT — ENCOUNTER SYMPTOMS
ADENOPATHY: 0
FLANK PAIN: 0
FREQUENCY: 1
LIGHT-HEADEDNESS: 0
CHEST TIGHTNESS: 0
HEADACHES: 0
DIARRHEA: 1
WHEEZING: 0
NAUSEA: 0
BLOOD IN STOOL: 1
WEAKNESS: 0
SLEEP DISTURBANCE: 0
FEVER: 0
FATIGUE: 1
ABDOMINAL PAIN: 0
MYALGIAS: 0
SHORTNESS OF BREATH: 0
ARTHRALGIAS: 1
VOMITING: 0
DYSURIA: 1
BACK PAIN: 1
COUGH: 0
UNEXPECTED WEIGHT CHANGE: 0
PALPITATIONS: 0
ABDOMINAL DISTENTION: 0
HEMATURIA: 0

## 2023-10-06 ENCOUNTER — APPOINTMENT (OUTPATIENT)
Dept: PRIMARY CARE | Facility: CLINIC | Age: 68
End: 2023-10-06
Payer: MEDICARE

## 2023-10-09 ENCOUNTER — OFFICE VISIT (OUTPATIENT)
Dept: PRIMARY CARE | Facility: CLINIC | Age: 68
End: 2023-10-09
Payer: MEDICARE

## 2023-10-09 VITALS
HEIGHT: 66 IN | HEART RATE: 63 BPM | DIASTOLIC BLOOD PRESSURE: 72 MMHG | OXYGEN SATURATION: 96 % | BODY MASS INDEX: 38.57 KG/M2 | RESPIRATION RATE: 16 BRPM | WEIGHT: 240 LBS | TEMPERATURE: 97.3 F | SYSTOLIC BLOOD PRESSURE: 114 MMHG

## 2023-10-09 DIAGNOSIS — E11.22 CONTROLLED TYPE 2 DIABETES MELLITUS WITH STAGE 3 CHRONIC KIDNEY DISEASE, WITHOUT LONG-TERM CURRENT USE OF INSULIN (MULTI): ICD-10-CM

## 2023-10-09 DIAGNOSIS — I42.8 CARDIOMYOPATHY, NONISCHEMIC (MULTI): ICD-10-CM

## 2023-10-09 DIAGNOSIS — N18.30 CONTROLLED TYPE 2 DIABETES MELLITUS WITH STAGE 3 CHRONIC KIDNEY DISEASE, WITHOUT LONG-TERM CURRENT USE OF INSULIN (MULTI): ICD-10-CM

## 2023-10-09 DIAGNOSIS — N30.00 ACUTE CYSTITIS WITHOUT HEMATURIA: Primary | ICD-10-CM

## 2023-10-09 DIAGNOSIS — I48.0 PAROXYSMAL ATRIAL FIBRILLATION WITH RVR (MULTI): ICD-10-CM

## 2023-10-09 LAB
POC APPEARANCE, URINE: CLEAR
POC BILIRUBIN, URINE: NEGATIVE
POC BLOOD, URINE: NEGATIVE
POC COLOR, URINE: YELLOW
POC GLUCOSE, URINE: NEGATIVE MG/DL
POC KETONES, URINE: NEGATIVE MG/DL
POC LEUKOCYTES, URINE: NEGATIVE
POC NITRITE,URINE: NEGATIVE
POC PH, URINE: 5.5 PH
POC PROTEIN, URINE: NEGATIVE MG/DL
POC SPECIFIC GRAVITY, URINE: 1.02
POC UROBILINOGEN, URINE: 0.2 EU/DL

## 2023-10-09 PROCEDURE — 3074F SYST BP LT 130 MM HG: CPT | Performed by: FAMILY MEDICINE

## 2023-10-09 PROCEDURE — 81003 URINALYSIS AUTO W/O SCOPE: CPT | Performed by: FAMILY MEDICINE

## 2023-10-09 PROCEDURE — 1036F TOBACCO NON-USER: CPT | Performed by: FAMILY MEDICINE

## 2023-10-09 PROCEDURE — 99214 OFFICE O/P EST MOD 30 MIN: CPT | Performed by: FAMILY MEDICINE

## 2023-10-09 PROCEDURE — 1159F MED LIST DOCD IN RCRD: CPT | Performed by: FAMILY MEDICINE

## 2023-10-09 PROCEDURE — 3078F DIAST BP <80 MM HG: CPT | Performed by: FAMILY MEDICINE

## 2023-10-09 PROCEDURE — 3044F HG A1C LEVEL LT 7.0%: CPT | Performed by: FAMILY MEDICINE

## 2023-10-09 PROCEDURE — 1160F RVW MEDS BY RX/DR IN RCRD: CPT | Performed by: FAMILY MEDICINE

## 2023-10-09 PROCEDURE — 4010F ACE/ARB THERAPY RXD/TAKEN: CPT | Performed by: FAMILY MEDICINE

## 2023-10-09 NOTE — PROGRESS NOTES
Subjective   Patient ID: Johnathan Akins is a 68 y.o. male who presents for Diabetes (6 month follow up ) and Cystitis (1 month follow up ).  HPI  68-year-old gent with a history of paroxysmal atrial fibrillation BPH type 2 diabetes here to recheck his hemorrhagic cystitis from mid September he took his antibiotic very well for 2 weeks and at this time he has no dysuria or urinary frequency.  For his diabetes takes 2.5 glipizide 3 times a day as well as metformin at night  Does take his diltiazem as well as Hytrin at night and also twice a day metoprolol for hypertension and paroxysmal atrial fib also takes Zocor for dyslipidemia and also.  Does follow with cardiology.  Otherwise the patient remains active trying to lose weight and otherwise sugars at home and fairly stable denies any recent resting or exertional chest pain shortness of breath or palpitations no increase in nocturia does take his daytime Aldactone also.  Ditropan is followed by urologist to reduce nighttime urinary spasm.  Also takes Prilosec for GERD.  Otherwise denies any recent fever chills hematuria dysuria.  Today's urine is completely negative for blood or leukocytes.  Earlier urine culture was negative  Review of Systems   Constitutional:  Negative for fatigue, fever and unexpected weight change.   Eyes:  Negative for visual disturbance.   Respiratory:  Negative for cough, chest tightness and shortness of breath.    Cardiovascular:  Negative for chest pain, palpitations and leg swelling.   Gastrointestinal:  Negative for abdominal pain, anal bleeding, blood in stool and vomiting.   Genitourinary:  Positive for frequency. Negative for difficulty urinating, dysuria, hematuria and testicular pain.   Musculoskeletal:  Positive for arthralgias and back pain. Negative for myalgias.   Skin:  Negative for rash.   Neurological:  Negative for weakness, light-headedness and headaches.   Psychiatric/Behavioral:  Negative for behavioral problems,  "confusion, sleep disturbance and suicidal ideas.        Objective   /72 (BP Location: Left arm, Patient Position: Sitting, BP Cuff Size: Large adult)   Pulse 63   Temp 36.3 °C (97.3 °F) (Temporal)   Resp 16   Ht 1.676 m (5' 6\")   Wt 109 kg (240 lb)   SpO2 96%   BMI 38.74 kg/m²         Results   POCT UA Automated manually resulted (Order [id number])  All Reviewers List    Nalini Khan MA on 9/18/2023 13:51   Livan Bartholomew DO on 9/15/2023 14:47   Livan Bartholomew DO on 9/14/2023 10:49      Contains abnormal data POCT UA Automated manually resulted  Order: [id number]  Status: Final result       Visible to patient: No (inaccessible in [facility])       Next appt: 12/18/2023 at 01:00 PM in Primary Care (Livan Bartholomew DO)       Dx: Increased urinary frequency    4 Result Notes      Component  Ref Range & Units 4 wk ago   POC Color, Urine  Straw, Yellow, Light Yellow Yellow   POC Appearance, Urine  Clear Clear   POC Specific Gravity, Urine  1.005 - 1.035 1.025   POC PH, Urine  No Reference Range Established PH 5.5   POC Protein, Urine  NEGATIVE, 30 (1+) mg/dl NEGATIVE   POC Glucose, Urine  NEGATIVE mg/dl NEGATIVE   POC Blood, Urine  NEGATIVE NEGATIVE   POC Ketones, Urine  NEGATIVE mg/dl NEGATIVE   POC Bilirubin, Urine  NEGATIVE NEGATIVE   POC Urobilinogen, Urine  0.2, 1.0 EU/DL 0.2   Poc Nitrate, Urine  NEGATIVE NEGATIVE   POC Leukocytes, Urine  NEGATIVE SMALL (1+) Abnormal                  rine Culture  Order: [id number]  Collected 9/13/2023 11:17       Status: Final result       Visible to patient: No (inaccessible in [facility])       Dx: Acute cystitis without hematuria    Specimen Information: Clean Catch/Voided; Urine   2 Result Notes  Urine Culture **Culture Comments - See Below         **Culture Comments: NO SIGNIFICANT GROWTH.        Resulting Agency: [facility]           Specimen Collected: 09/13/23 11:17         Results  POCT UA Automated manually resulted (Order [id number])  All Reviewers " List    Nalini Khan MA on 10/10/2023 08:25   Livan BartholomewDO on 10/9/2023 18:02     POCT UA Automated manually resulted  Order: [id number]  Status: Final result       Visible to patient: No (inaccessible in [facility])       Next appt: 12/18/2023 at 01:00 PM in Primary Care (Livan URRUTIA Puma, )       Dx: Acute cystitis without hematuria    2 Result Notes       Component  Ref Range & Units 3 d ago 4 wk ago   POC Color, Urine  Straw, Yellow, Light-Yellow Yellow Yellow R   POC Appearance, Urine  Clear Clear Clear   POC Specific Gravity, Urine  1.005 - 1.035 1.020 1.025   POC PH, Urine  No Reference Range Established PH 5.5 5.5   POC Protein, Urine  NEGATIVE, 30 (1+) mg/dl NEGATIVE NEGATIVE   POC Glucose, Urine  NEGATIVE mg/dl NEGATIVE NEGATIVE   POC Blood, Urine  NEGATIVE NEGATIVE NEGATIVE   POC Ketones, Urine  NEGATIVE mg/dl NEGATIVE NEGATIVE   POC Bilirubin, Urine  NEGATIVE NEGATIVE NEGATIVE   POC Urobilinogen, Urine  0.2, 1.0 EU/DL 0.2 0.2   Poc Nitrate, Urine  NEGATIVE NEGATIVE NEGATIVE   POC Leukocytes, Urine  NEGATIVE        Contains abnormal data Hemoglobin A1c  Order: [id number]  Status: Final result       Visible to patient: No (inaccessible in [facility])       Dx: Controlled type 2 diabetes mellitus w...    2 Result Notes       1 Follow-up Encounter       1  Topic          Component  Ref Range & Units 5 mo ago 1 yr ago 2 yr ago 3 yr ago 4 yr ago   Hemoglobin A1C  % 6.4 Abnormal  6.2 Abnormal  CM 6.2 CM 5.9 CM 6.6 CM   Comment:      Diagnosis of Diabetes-Adults   Non-Diabetic: < or = 5.6%   Increased        Contains abnormal data Comprehensive metabolic panel  Order: [id number]  Status: Final result       Visible to patient: No (inaccessible in [facility])       Dx: Paroxysmal atrial fibrillation with R...    2 Result Notes       1 Follow-up Encounter              Component  Ref Range & Units 5 mo ago 6 mo ago 8 mo ago 1 yr ago 2 yr ago 3 yr ago 4 yr ago   Glucose  74 - 99 mg/dL 102 High   80 121 High  106  High  91 127 High    Sodium  136 - 145 mmol/L 135 Low   132 Low  134 Low  136 136 135 Low    Potassium  3.5 - 5.3 mmol/L 5.1  4.9 5.0 4.7 5.1 4.7   Chloride  98 - 107 mmol/L 102  100 102 104 102 103   Bicarbonate  21 - 32 mmol/L 25  24 23 23 26 25   Anion Gap  10 - 20 mmol/L 13  13 14 14 13 12   Urea Nitrogen  6 - 23 mg/dL 17  19 24 High  20 17 17   Creatinine  0.50 - 1.30 mg/dL 1.18 1.20 1.26 1.24 1.17 1.13 1.19   GFR MALE  >90 mL/min/1.73m2 66 65 CM 61 CM 63 CM      Comment:  CALCULATIONS OF ESTIMATED GFR ARE PERFORMED   USING THE 2021 CKD-EPI STUDY REFIT EQUATION   WITHOUT THE RACE VARIABLE FOR THE IDMS-TRACEABLE   CREATININE METHODS.    https://jasn.asnjournals.org/content/early/2021/09/22/ASN.8496811030   Calcium  8.6 - 10.3 mg/dL 9.4  9.4 9.3 9.1 9.8 9.6   Albumin  3.4 - 5.0 g/dL 4.0  3.7  4.1 4.1    Alkaline Phosphatase  33 - 136 U/L 85  74  82 94    Total Protein  6.4 - 8.2 g/dL 6.7  7.1  7.1 7.4    AST  9 - 39 U/L 10  12  12 16    Total Bilirubin  0.0 - 1.2 mg/dL 0.4  0.5  0.5 0.4    ALT (SGPT)  10 - 52 U/L 11  11 CM  13 CM 17 CM    Comment:  Patients treated with Sulfasalazine may generate   falsely decreased results for ALT.   [place]ing Agency [facility]                  Physical Exam  Vital signs reviewed and normal  General-inspection is a pleasant obese individual in no acute distress  Neck neck is soft without masses adenopathy.  Rigidity thyroid is normal  Chest chest is clear without wheezing rales or rhonchi  Cardiovascular regular sinus rhythm without significant murmurs gallop or ectopy  Abdominal no organomegaly masses or rebound no CVA tenderness no suprapubic tenderness.  No ascites bowel sounds are normal  Musculoskeletal good range of motion upper lower extremities without acute synovitis  Skin-no rash petechiae jaundice  Mood mood is stable on anxiety depressive or cognitive issues  Peripheral vascular-no symmetric or asymmetric edema no striking sensorimotor deficits although slightly reduced  vibration on the dorsal aspect of the feet.  Repeat urine today is completely negative and earlier A1c was good at 6.3 with stable sugar potassium and kidney functions      Assessment/Plan   Problem List Items Addressed This Visit       Paroxysmal atrial fibrillation with RVR (CMS/HCC)    Acute cystitis without hematuria    Relevant Orders    POCT UA Automated manually resulted   After 2 weeks of antibiotics increasing urine his urine today is completely negative we will continue his Flomax at nighttime follow-up with Dr. Patten will also follow-up with cardiology as he is done very well and is anticoagulation diltiazem beta-blocker and nighttime Hytrin instead of Flomax to correct this  Continue healthy routines low-salt low carbohydrate and low-fat diet continue metformin as well as glipizide.  Diabetic goals diabetic diet diabetic medicines reviewed  Follow-up in about 2 to 3 months at which time we will recheck his A1c.  Otherwise definite improvement and is doing well from a cardiopulmonary standpoint.  @discharge  The above diagnosis and treatment plan was discussed with the patient patient will continue appropriate diet and exercise as reviewed  Patient will recheck earlier if any interval problems of significance or clinical worsening of the above problems.  Agrees above surveillance.  All question were addressed regarding above meds

## 2023-10-12 PROBLEM — I42.8 CARDIOMYOPATHY, NONISCHEMIC (MULTI): Status: ACTIVE | Noted: 2023-10-12

## 2023-10-12 ASSESSMENT — ENCOUNTER SYMPTOMS
ANAL BLEEDING: 0
HEMATURIA: 0
SHORTNESS OF BREATH: 0
CONFUSION: 0
ABDOMINAL PAIN: 0
FREQUENCY: 1
DIFFICULTY URINATING: 0
CHEST TIGHTNESS: 0
PALPITATIONS: 0
BLOOD IN STOOL: 0
BACK PAIN: 1
UNEXPECTED WEIGHT CHANGE: 0
FEVER: 0
VOMITING: 0
COUGH: 0
LIGHT-HEADEDNESS: 0
MYALGIAS: 0
WEAKNESS: 0
ARTHRALGIAS: 1
DYSURIA: 0
SLEEP DISTURBANCE: 0
HEADACHES: 0
FATIGUE: 0

## 2023-10-16 ENCOUNTER — APPOINTMENT (OUTPATIENT)
Dept: PRIMARY CARE | Facility: CLINIC | Age: 68
End: 2023-10-16
Payer: MEDICARE

## 2023-11-02 DIAGNOSIS — I10 PRIMARY HYPERTENSION: Primary | ICD-10-CM

## 2023-11-02 RX ORDER — LISINOPRIL 5 MG/1
5 TABLET ORAL DAILY
Qty: 90 TABLET | Refills: 3 | Status: SHIPPED | OUTPATIENT
Start: 2023-11-02

## 2023-11-02 NOTE — TELEPHONE ENCOUNTER
Rx Refill Request Telephone Encounter    Name:  Johnathan Akins  :  [id number]  Medication Name:  lisinopril 5 mg   Specific Pharmacy location:  [facility]  Date of last appointment:  10/9  Date of next appointment:    Best number to reach patient:  [phone]

## 2023-12-18 ENCOUNTER — OFFICE VISIT (OUTPATIENT)
Dept: PRIMARY CARE | Facility: CLINIC | Age: 68
End: 2023-12-18
Payer: MEDICARE

## 2023-12-18 VITALS
WEIGHT: 280 LBS | BODY MASS INDEX: 45 KG/M2 | TEMPERATURE: 96 F | DIASTOLIC BLOOD PRESSURE: 62 MMHG | HEART RATE: 80 BPM | OXYGEN SATURATION: 97 % | HEIGHT: 66 IN | RESPIRATION RATE: 16 BRPM | SYSTOLIC BLOOD PRESSURE: 112 MMHG

## 2023-12-18 DIAGNOSIS — E78.00 HYPERCHOLESTEROLEMIA: ICD-10-CM

## 2023-12-18 DIAGNOSIS — I48.0 PAROXYSMAL ATRIAL FIBRILLATION WITH RVR (MULTI): ICD-10-CM

## 2023-12-18 DIAGNOSIS — N18.30 CONTROLLED TYPE 2 DIABETES MELLITUS WITH STAGE 3 CHRONIC KIDNEY DISEASE, WITHOUT LONG-TERM CURRENT USE OF INSULIN (MULTI): Primary | ICD-10-CM

## 2023-12-18 DIAGNOSIS — E11.22 CONTROLLED TYPE 2 DIABETES MELLITUS WITH STAGE 3 CHRONIC KIDNEY DISEASE, WITHOUT LONG-TERM CURRENT USE OF INSULIN (MULTI): Primary | ICD-10-CM

## 2023-12-18 DIAGNOSIS — I10 PRIMARY HYPERTENSION: ICD-10-CM

## 2023-12-18 DIAGNOSIS — E66.01 OBESITY, MORBID (MULTI): ICD-10-CM

## 2023-12-18 PROCEDURE — 1036F TOBACCO NON-USER: CPT | Performed by: FAMILY MEDICINE

## 2023-12-18 PROCEDURE — 4010F ACE/ARB THERAPY RXD/TAKEN: CPT | Performed by: FAMILY MEDICINE

## 2023-12-18 PROCEDURE — 1160F RVW MEDS BY RX/DR IN RCRD: CPT | Performed by: FAMILY MEDICINE

## 2023-12-18 PROCEDURE — 3078F DIAST BP <80 MM HG: CPT | Performed by: FAMILY MEDICINE

## 2023-12-18 PROCEDURE — 3074F SYST BP LT 130 MM HG: CPT | Performed by: FAMILY MEDICINE

## 2023-12-18 PROCEDURE — 1159F MED LIST DOCD IN RCRD: CPT | Performed by: FAMILY MEDICINE

## 2023-12-18 PROCEDURE — 99214 OFFICE O/P EST MOD 30 MIN: CPT | Performed by: FAMILY MEDICINE

## 2023-12-18 PROCEDURE — 3044F HG A1C LEVEL LT 7.0%: CPT | Performed by: FAMILY MEDICINE

## 2023-12-18 NOTE — PROGRESS NOTES
Subjective   Patient ID: Johnathan Akins is a 68 y.o. male who presents for Diabetes (2 month follow up ).  HPI  Diabetic 68-year-old gentleman is here to recheck otherwise he remains on his metformin twice a day as well as his glipizide 2.5 mg before each meal and also a bedtime snack sugars range 100 2040 at home last A1c was 6.4.  Otherwise fairly good control of his sugar and remains on a low-salt low carbohydrate and low-fat diet with nonischemic cardiomyopathy also takes Cardizem and metoprolol for his paroxysmal atrial fibs followed by cardiology Dr. Montoya  Also takes Aldactone low-dose once a day well as a Xarelto for his paroxysmal atrial fibs.  Gratefully no resting or exertional chest pain shortness of breath or palpitations no new GI  issues is followed by Dr. Patten for BPH and does take his Hytrin at night and low Flomax in the daytime.  Also newly diagnosed B12 deficiency is on over-the-counter B12  Otherwise trying to lose weight at this time and is aware of low-salt low carbohydrate low-fat diet.  Otherwise no new psychological medical problems  Chronic meds reviewed.  No reported side effects.  Review of Systems   Constitutional:  Negative for fatigue, fever and unexpected weight change.   Eyes:  Negative for visual disturbance.   Respiratory:  Negative for cough, chest tightness and shortness of breath.    Cardiovascular:  Negative for chest pain, palpitations and leg swelling.   Gastrointestinal:  Negative for abdominal pain, blood in stool and vomiting.   Genitourinary:  Positive for frequency. Negative for dysuria and hematuria.   Musculoskeletal:  Positive for arthralgias and myalgias.   Skin:  Negative for rash.   Neurological:  Negative for weakness, light-headedness and headaches.   Hematological:  Negative for adenopathy.   Psychiatric/Behavioral:  Negative for behavioral problems, confusion, sleep disturbance and suicidal ideas.        Objective   /62 (BP Location: Right  "arm, Patient Position: Sitting, BP Cuff Size: Large adult)   Pulse 80   Temp 35.6 °C (96 °F) (Temporal)   Resp 16   Ht 1.676 m (5' 6\")   Wt 127 kg (280 lb)   SpO2 97%   BMI 45.19 kg/m²   itamin B12  Order: [id number]  Status: Final result       Visible to patient: No (inaccessible in [facility])       Dx: Tingling of both feet    2 Result Notes       1 Follow-up Encounter      Component  Ref Range & Units 8 mo ago   Vitamin B-12  211 - 911 pg/mL 238   Resulting Ag       TSH  Order: [id number]  Status: Final result       Visible to patient: No (inaccessible in [facility])       Dx: Paroxysmal atrial fibrillation with R...    2 Result Notes       1 Follow-up Encounter      Component  Ref Range & Units 8 mo ago   TSH  0.44 - 3.98 mIU/L 1.83   Comment:  TSH testing is performed using different testing   methodology at [facility] than at other   system h       Contains abnormal data Comprehensive metabolic panel  Order: [id number]  Status: Final result       Visible to patient: No (inaccessible in [facility])       Dx: Paroxysmal atrial fibrillation with R...    2 Result Notes       1 Follow-up Encounter            Component  Ref Range & Units 8 mo ago  (4/18/23) 8 mo ago  (3/29/23) 10 mo ago  (2/6/23) 1 yr ago  (4/8/22) 2 yr ago  (6/9/21) 4 yr ago  (11/6/19) 4 yr ago  (5/30/19)   Glucose  74 - 99 mg/dL 102 High   80 121 High  106 High  91 127 High    Sodium  136 - 145 mmol/L 135 Low   132 Low  134 Low  136 136 135 Low    Potassium  3.5 - 5.3 mmol/L 5.1  4.9 5.0 4.7 5.1 4.7   Chloride  98 - 107 mmol/L 102  100 102 104 102 103   Bicarbonate  21 - 32 mmol/L 25  24 23 23 26 25   Anion Gap  10 - 20 mmol/L 13  13 14 14 13 12   Urea Nitrogen  6 - 23 mg/dL 17  19 24 High  20 17 17   Creatinine  0.50 - 1.30 mg/dL 1.18 1.20 1.26 1.24 1.17 1.13 1.19   GFR MALE  >90 mL/min/1.73m2 66 65 CM 61 CM 63 CM      Comment:  CALCULATIONS OF ESTIMATED GFR ARE PERFORMED   USING THE 2021 CKD-EPI STUDY REFIT EQUATION   WITHOUT THE " RACE VARIABLE FOR THE IDMS-TRACEABLE   CREATININE METHODS.    https://jasn.asnjournals.org/content/early/2021/09/22/ASN.0940070375   Calcium  8.6 - 10.3 mg/dL 9.4  9.4 9.3 9.1 9.8 9.6   Albumin  3.4 - 5.0 g/dL 4.0  3.7  4.1 4.1    Alkaline Phosphatase  33 - 136 U/L 85  74  82 94    Total Protein  6.4 - 8.2 g/dL 6.7  7.1  7.1 7.4    AST  9 - 39 U/L 10  12  12 16    Total Bilirubin  0.0 - 1.2 mg/dL 0.4  0.5  0.5 0.4    ALT (SGPT)  10 - 52 U/L 11  11 CM  13 CM 17 CM    Comment:  Patients treated with Sulfasalazine may generate   falsely decreased      Prostate Specific Antigen  Order: [id number]  Status: Final result       [facility] patient: No [place]    0 Result Notes         Component  Ref Range & Units 10 mo ago 1 yr ago 2 yr ago 3 yr ago   PSA  0.00 - 4.00 ng/mL 0.50 0.50 CM 0.50 CM 0.60 CM   Comment: The FDA requires that the method used for PSA assay be  reported to the physician. Values obtained with different  assay methods must not be used interchangeably. This test  was performed at [facility] using the Acc              Physical Exam  Signs reviewed and normal weight up from 2 80-90  General-inspection was an obese individual in no acute distress  Neck neck is supple out mass adenopathy bruits or rigidity thyroid is normal  Cardiovascular is very soft grade 1/2 over 6 is logics murmur otherwise no S3 gallop and no ectopy today.  Respiratory-chest clear without wheezing rales or rhonchi  Abdominal-no organomegaly mass rebound mid upper abdomen no CVA tenderness  Peripheral vascular Homans' sign is negative no symmetric or asymmetric edema very minimal reduction to touch-vibration of the dorsal aspect of the feet otherwise pulses are symmetrical and normal  Skin-no rashes petechiae or jaundice  Neurological cranial nerves are intact no new focal deficit upper or lower extremities  Mood mood is stable without anxiety depressive or cognitive issues  Reviewed home Accu-Cheks 68  does check it as well as A1c and earlier labs      Assessment/Plan   Problem List Items Addressed This Visit    None  Continue weight loss maneuvers as well as low-salt low carbohydrate low-fat diet  Follow-up with cardiology  Follow with urology  Gets his PSA through Dr. Patten  Will come in and 3 to 4 months with previsit urine albumin and A1c in CMP.  Continue above meds and notifies any interval problems aware of ER parameters but gratefully his regular sinus rhythm on Xarelto without side effects  @discharge  The above diagnosis and treatment plan was discussed with the patient patient will continue appropriate diet and exercise as reviewed  Patient will recheck earlier if any interval problems of significance or clinical worsening of the above problems.  Agrees above surveillance.  All question were addressed regarding above meds

## 2023-12-20 DIAGNOSIS — R32 URINARY INCONTINENCE, UNSPECIFIED TYPE: ICD-10-CM

## 2023-12-20 DIAGNOSIS — R35.0 INCREASED URINARY FREQUENCY: ICD-10-CM

## 2023-12-20 RX ORDER — TAMSULOSIN HYDROCHLORIDE 0.4 MG/1
0.4 CAPSULE ORAL DAILY
Qty: 90 CAPSULE | Refills: 1 | Status: SHIPPED | OUTPATIENT
Start: 2023-12-20 | End: 2023-12-29 | Stop reason: SDUPTHER

## 2023-12-20 NOTE — TELEPHONE ENCOUNTER
Rx Refill Request Telephone Encounter    Name:  Johnathan Akins  :  [id number]  Medication Name:  tamsulosin (Flomax) 0.4 mg 24 hr capsule   Terazosin 2mg  Specific Pharmacy location:  [facility]  Date of last appointment:  23  Date of next appointment:  3/18/24  Best number to reach patient:  [phone]

## 2023-12-22 PROBLEM — N30.00 ACUTE CYSTITIS WITHOUT HEMATURIA: Status: RESOLVED | Noted: 2023-09-17 | Resolved: 2023-12-22

## 2023-12-22 PROBLEM — E66.01 OBESITY, MORBID (MULTI): Status: ACTIVE | Noted: 2023-12-22

## 2023-12-22 ASSESSMENT — ENCOUNTER SYMPTOMS
LIGHT-HEADEDNESS: 0
ADENOPATHY: 0
CHEST TIGHTNESS: 0
FATIGUE: 0
UNEXPECTED WEIGHT CHANGE: 0
MYALGIAS: 1
BLOOD IN STOOL: 0
HEMATURIA: 0
WEAKNESS: 0
PALPITATIONS: 0
CONFUSION: 0
FEVER: 0
ARTHRALGIAS: 1
SLEEP DISTURBANCE: 0
ABDOMINAL PAIN: 0
COUGH: 0
DYSURIA: 0
FREQUENCY: 1
SHORTNESS OF BREATH: 0
HEADACHES: 0
VOMITING: 0

## 2023-12-29 DIAGNOSIS — R32 URINARY INCONTINENCE, UNSPECIFIED TYPE: ICD-10-CM

## 2023-12-29 DIAGNOSIS — R35.0 INCREASED URINARY FREQUENCY: ICD-10-CM

## 2023-12-30 RX ORDER — TERAZOSIN 2 MG/1
2 CAPSULE ORAL NIGHTLY
Qty: 90 CAPSULE | Refills: 3 | Status: SHIPPED | OUTPATIENT
Start: 2023-12-30 | End: 2024-12-29

## 2023-12-30 RX ORDER — TAMSULOSIN HYDROCHLORIDE 0.4 MG/1
0.4 CAPSULE ORAL DAILY
Qty: 90 CAPSULE | Refills: 1 | Status: SHIPPED | OUTPATIENT
Start: 2023-12-30

## 2024-02-07 ENCOUNTER — LAB (OUTPATIENT)
Dept: LAB | Facility: LAB | Age: 69
End: 2024-02-07
Payer: MEDICARE

## 2024-02-07 DIAGNOSIS — R32 URINARY INCONTINENCE, UNSPECIFIED TYPE: ICD-10-CM

## 2024-02-07 DIAGNOSIS — R35.0 URINARY FREQUENCY: ICD-10-CM

## 2024-02-07 LAB
APPEARANCE UR: CLEAR
BILIRUB UR STRIP.AUTO-MCNC: NEGATIVE MG/DL
COLOR UR: YELLOW
GLUCOSE UR STRIP.AUTO-MCNC: NEGATIVE MG/DL
KETONES UR STRIP.AUTO-MCNC: NEGATIVE MG/DL
LEUKOCYTE ESTERASE UR QL STRIP.AUTO: NEGATIVE
NITRITE UR QL STRIP.AUTO: NEGATIVE
PH UR STRIP.AUTO: 6 [PH]
PROT UR STRIP.AUTO-MCNC: NEGATIVE MG/DL
PSA SERPL-MCNC: 0.66 NG/ML
RBC # UR STRIP.AUTO: NEGATIVE /UL
SP GR UR STRIP.AUTO: 1.02
UROBILINOGEN UR STRIP.AUTO-MCNC: <2 MG/DL

## 2024-02-07 PROCEDURE — 36415 COLL VENOUS BLD VENIPUNCTURE: CPT

## 2024-02-07 PROCEDURE — 88112 CYTOPATH CELL ENHANCE TECH: CPT

## 2024-02-07 PROCEDURE — 88112 CYTOPATH CELL ENHANCE TECH: CPT | Performed by: PATHOLOGY

## 2024-02-07 PROCEDURE — 84153 ASSAY OF PSA TOTAL: CPT

## 2024-02-07 PROCEDURE — 87086 URINE CULTURE/COLONY COUNT: CPT

## 2024-02-07 PROCEDURE — 81003 URINALYSIS AUTO W/O SCOPE: CPT

## 2024-02-08 LAB
BACTERIA UR CULT: NORMAL
LABORATORY COMMENT REPORT: NORMAL
LABORATORY COMMENT REPORT: NORMAL
PATH REPORT.FINAL DX SPEC: NORMAL
PATH REPORT.GROSS SPEC: NORMAL
PATH REPORT.RELEVANT HX SPEC: NORMAL
PATH REPORT.TOTAL CANCER: NORMAL

## 2024-02-12 ENCOUNTER — APPOINTMENT (OUTPATIENT)
Dept: UROLOGY | Facility: CLINIC | Age: 69
End: 2024-02-12
Payer: MEDICARE

## 2024-03-01 ENCOUNTER — OFFICE VISIT (OUTPATIENT)
Dept: UROLOGY | Facility: CLINIC | Age: 69
End: 2024-03-01
Payer: MEDICARE

## 2024-03-01 VITALS
WEIGHT: 269.18 LBS | DIASTOLIC BLOOD PRESSURE: 70 MMHG | HEART RATE: 84 BPM | SYSTOLIC BLOOD PRESSURE: 137 MMHG | HEIGHT: 66 IN | RESPIRATION RATE: 16 BRPM | BODY MASS INDEX: 43.26 KG/M2

## 2024-03-01 DIAGNOSIS — N39.0 URINARY TRACT INFECTION WITH HEMATURIA, SITE UNSPECIFIED: ICD-10-CM

## 2024-03-01 DIAGNOSIS — R31.9 URINARY TRACT INFECTION WITH HEMATURIA, SITE UNSPECIFIED: ICD-10-CM

## 2024-03-01 DIAGNOSIS — R82.89 ABNORMAL URINE CYTOLOGY: Primary | ICD-10-CM

## 2024-03-01 DIAGNOSIS — R33.9 RETENTION OF URINE: ICD-10-CM

## 2024-03-01 DIAGNOSIS — Z12.5 SCREENING PSA (PROSTATE SPECIFIC ANTIGEN): ICD-10-CM

## 2024-03-01 PROCEDURE — 51741 ELECTRO-UROFLOWMETRY FIRST: CPT | Performed by: UROLOGY

## 2024-03-01 PROCEDURE — 4010F ACE/ARB THERAPY RXD/TAKEN: CPT | Performed by: UROLOGY

## 2024-03-01 PROCEDURE — 99213 OFFICE O/P EST LOW 20 MIN: CPT | Performed by: UROLOGY

## 2024-03-01 PROCEDURE — 51798 US URINE CAPACITY MEASURE: CPT | Performed by: UROLOGY

## 2024-03-01 PROCEDURE — 3078F DIAST BP <80 MM HG: CPT | Performed by: UROLOGY

## 2024-03-01 PROCEDURE — 1160F RVW MEDS BY RX/DR IN RCRD: CPT | Performed by: UROLOGY

## 2024-03-01 PROCEDURE — 1159F MED LIST DOCD IN RCRD: CPT | Performed by: UROLOGY

## 2024-03-01 PROCEDURE — 3075F SYST BP GE 130 - 139MM HG: CPT | Performed by: UROLOGY

## 2024-03-01 PROCEDURE — 1036F TOBACCO NON-USER: CPT | Performed by: UROLOGY

## 2024-03-01 ASSESSMENT — ENCOUNTER SYMPTOMS
FREQUENCY: 0
DIFFICULTY URINATING: 0
HEMATURIA: 0
DYSURIA: 0

## 2024-03-01 NOTE — PATIENT INSTRUCTIONS
Patient Discussion/Summary     It was great to see you and your wife Katrin today.  You are complaining of nighttime urinary leakage.  However you independently discontinued the oxybutynin which was prescribed for that problem.  Evidently, your insurance carrier changed and they no longer provided you with that medication.  I would like you to restart the daily Flomax in the morning, Hytrin at bedtime as well as oxybutynin at bedtime.  Your urine studies showed no blood, no infection and no cancer cells.  Your PSA was normal at 0.66.  I will see you again in 1 year.      This note was created with voice-recognition software and was not corrected for typographical or grammatical errors.

## 2024-03-01 NOTE — PROGRESS NOTES
Provider Impressions     67 year-old  male. Originally seen in the emergency room for GROSS HEMATURIA. Urine CYTOLOGY was ATYPICAL. He had an Escherichia coli URINARY TRACT INFECTION. Retired. Positive family history for prostate cancer. 35-pack-year cigarette smoking history.       COUMADIN      02/01/13 CYSTOSCOPY with URODYNAMICS. Patchy erythema. Patient was initiated on Proscar and Toviaz 4 mg daily.     05/10/13, CYSTOSCOPY revealed no erythema and severe obstruction.     04/09/14 patient was switched from Proscar to Hytrin 1 mg by mouth each bedtime. NOCTURIA went from x4 to x1.     03/23/15, all urine studies negative. PSA is 0.5. Good flow rate 15 cc per second. Patient has no new complaints.     03/08/16, all urine studies are negative. PSA is 0.7. Excellent flow at 10 cc/s with a PVR of 0. Patient has no new complaints. The cystoscopy shows a severely obstructed prostatic urethra and median lobe. However, the patient continues to void well. He will return in 1 year.     02/07/17, patient has no new complaints. PSA 0.7. All urine studies are negative. Cystoscopy once again reveals a severely obstructed prostatic urethra and huge median lobe. Flow rate of 15 with a PVR of 86. We will continue with Toviaz at the 4 mg dose and Hytrin 1 mg daily at bedtime. He will return in 1 year.     05/21/17, insurance will no longer cover Toviaz, so Vesicare was ordered. Vesicare is covered however it is too expensive. Trospium X are was ordered.     02/06/18, patient with no complaints. PSA is now 0.8. All urine studies is negative. Cystoscopy shows improvement now with moderately obstructed prostatic urethra and moderately elevated median lobe. Flow rate of 9 with a PVR of 0. We will continue with Sanctura XR and Hytrin at the 1 mg dose daily at bedtime. He will return in 1 year. We will not perform another cystoscopy unless his condition changes.     02/13/19, patient states that he was quite ill with  flulike symptoms for her. Of 12 days, he was very weak and sought medical attention. He now returns with a urinary tract infection of Morganella. He is not exposed to nursing home's, and his wife did have a UTI concurrently, unknown organism. In any case, his repeat cultures negative. However flow rate is down to 7 with a PVR of 236. We will add Flomax to his regimen. He will continue on Hytrin nightly and Sanctura XR in the daytime for his urgency and frequency. However, upon his return in 3 months, if we do not see an improvement with Flomax, we will repeat a cystoscopy with urodynamics.     05/28/19, patient states that the addition of Flomax has made a significant improvement. Flow rate is now up to 11 with a PVR of only 72. He will continue on Flomax and Sanctura XR daily and Hytrin 1 mg at night. I will see him again in February.     03/11/20, telephone call, PSA 0.60, cytology negative, urinalysis negative, urine culture no growth.     02/02/21, patient arrives alone. He continues to have excellent response to the triple medication of daytime Flomax and Sanctura XR and nighttime Hytrin 1 mg. Good flow rate of 8 cc/s with a PVR of 0. Urinalysis, urine culture and urine cytology are all negative. PSA 0.5. He will return next February.     February 8, 2022, patient arrives with his wife. He continues to have excellent flow, no urinary frequency and minimal nocturia x2 with a combination of daily Flomax and Sanctura Exar in addition to nighttime Hytrin 1 mg. Today's flow rate 7 cc/s, total volume 61 cc, PVR 0 cc. Urinalysis and urine culture are negative. Urine cytology is pending. PSA again is 0.5. He will return in 1 year     June 30, 2022. Patient states by telephone that he is experiencing nocturia with incontinence x5. We ordered a urinalysis and urine culture and added oxybutynin IR 5 mg p.o. nightly.     July 2, 2022, flow rate 10 cc/s,  cc.     February 7, 2023, patient arrives with his wife  Katrin. No flow rate today, PVR of 299. PSA 0.50 and urine culture no growth. I suspect his prostate continues to enlarge with more obstruction. I am concerned about renal function. The addition of oxybutynin at night has made a difference with nocturia x6 now down to x3. Therefore, we will continue Flomax daily. Discontinue Sanctura, increase the Hytrin to 2 mg in the oxybutynin to ER 10 mg nightly. He will return for cystoscopy, flow studies and a discussion of treatment options. We will also obtain a creatinine. He may require minimally invasive prostate surgery.     April 4, 2023, cystoscopy, flow studies and a discussion of treatment options. Cystoscopy does reveal moderately obstructed prostatic urethra and mildly elevated median lobe. Trabeculated bladder. Patient states that his flow has decreased significantly and he is now experiencing incontinence. Flow rate of 6 cc/s, total volume 34 cc,  cc. He is maintained on daily Flomax, nighttime Hytrin 2 mg with oxybutynin IR 5 mg. Creatinine is normal at 1.20 with GFR 65. I will send him to see Dr. Nakul Bird for further evaluation and possible HoLEP procedure.    May 23, 2023, office visit, Dr. Nakul Bird.  Patient states he was emptying his bladder well with a PVR of only 39 cc.  No bothersome symptoms at that point and happy with current management.  No reason to pursue surgical intervention.    March 1, 2024, patient arrives with his wife Katrin.  He states that he now has urinary incontinence only at night.  He continues on daily Flomax and Hytrin.  However, he is independently discontinued the oxybutynin and states that is when the incontinence began.  He stated that his new insurance carrier refused to give him both Hytrin and oxybutynin at the same time.  We will reinstitute his routine.  Urinalysis is negative for blood.  Urine culture no growth.  Urine cytology is negative for malignant cells and PSA is normal at 0.66.  Today's flow rate 12  cc/s with a PVR of 137 cc.  We will reinstitute his medication regimen and have him return in a year.     PLAN:     #1 the patient will return In February 2025 with a flowrate, post void residual, urine studies and a PSA     #2 the patient will be maintained on Hytrin 2 mg and oxybutynin ER 10 mg both by mouth each bedtime, addition to Flomax 0.4 mg by mouth daily    Physical Exam  Vitals and nursing note reviewed.   Constitutional:       Appearance: Normal appearance.   HENT:      Head: Normocephalic and atraumatic.   Pulmonary:      Effort: Pulmonary effort is normal.   Abdominal:      Palpations: Abdomen is soft.      Tenderness: There is no abdominal tenderness.   Musculoskeletal:         General: Normal range of motion.      Cervical back: Normal range of motion and neck supple.   Neurological:      General: No focal deficit present.      Mental Status: He is alert and oriented to person, place, and time.   Psychiatric:         Mood and Affect: Mood normal.         Behavior: Behavior normal.         This note was created with voice-recognition software and was not corrected for typographical or grammatical errors

## 2024-03-01 NOTE — PROGRESS NOTES
Patient denies any pain today. Patient has not had any recent surgeries or hospital admits. Patient denies any concerns about falling or safety. Patient has concerns for nocturia/incontinence, and incomplete emptying. Patient taking Flomax and Hytrin as directed. Patient seen Dr. Bird 5/25/23, will only follow up as needed. CV     Review of Systems   Genitourinary:  Positive for enuresis and urgency. Negative for difficulty urinating, dysuria, frequency and hematuria.   All other systems reviewed and are negative.

## 2024-03-01 NOTE — LETTER
March 1, 2024     Livan Bartholomew DO  [address] [facility] [address]    Patient: Johnathan Akins   YOB: 1955   Date of Visit: 3/1/2024       Dear Dr. Livan Bartholomew DO:    Thank you for referring Johnathan Akins to me for evaluation. Below are my notes for this consultation.  If you have questions, please do not hesitate to call me. I look forward to following your patient along with you.       Sincerely,     Leo Patten MD      CC: No Recipients  ______________________________________________________________________________________            Provider Impressions     67 year-old  male. Originally seen in the emergency room for GROSS HEMATURIA. Urine CYTOLOGY was ATYPICAL. He had an Escherichia coli URINARY TRACT INFECTION. Retired. Positive family history for prostate cancer. 35-pack-year cigarette smoking history.       COUMADIN      02/01/13 CYSTOSCOPY with URODYNAMICS. Patchy erythema. Patient was initiated on Proscar and Toviaz 4 mg daily.     05/10/13, CYSTOSCOPY revealed no erythema and severe obstruction.     04/09/14 patient was switched from Proscar to Hytrin 1 mg by mouth each bedtime. NOCTURIA went from x4 to x1.     03/23/15, all urine studies negative. PSA is 0.5. Good flow rate 15 cc per second. Patient has no new complaints.     03/08/16, all urine studies are negative. PSA is 0.7. Excellent flow at 10 cc/s with a PVR of 0. Patient has no new complaints. The cystoscopy shows a severely obstructed prostatic urethra and median lobe. However, the patient continues to void well. He will return in 1 year.     02/07/17, patient has no new complaints. PSA 0.7. All urine studies are negative. Cystoscopy once again reveals a severely obstructed prostatic urethra and huge median lobe. Flow rate of 15 with a PVR of 86. We will continue with Toviaz at the 4 mg dose and Hytrin 1 mg daily at bedtime. He will return in 1 year.     05/21/17, insurance will no longer cover Toviaz,  so Vesicare was ordered. Vesicare is covered however it is too expensive. Trospium X are was ordered.     02/06/18, patient with no complaints. PSA is now 0.8. All urine studies is negative. Cystoscopy shows improvement now with moderately obstructed prostatic urethra and moderately elevated median lobe. Flow rate of 9 with a PVR of 0. We will continue with Sanctura XR and Hytrin at the 1 mg dose daily at bedtime. He will return in 1 year. We will not perform another cystoscopy unless his condition changes.     02/13/19, patient states that he was quite ill with flulike symptoms for her. Of 12 days, he was very weak and sought medical attention. He now returns with a urinary tract infection of Morganella. He is not exposed to nursing home's, and his wife did have a UTI concurrently, unknown organism. In any case, his repeat cultures negative. However flow rate is down to 7 with a PVR of 236. We will add Flomax to his regimen. He will continue on Hytrin nightly and Sanctura XR in the daytime for his urgency and frequency. However, upon his return in 3 months, if we do not see an improvement with Flomax, we will repeat a cystoscopy with urodynamics.     05/28/19, patient states that the addition of Flomax has made a significant improvement. Flow rate is now up to 11 with a PVR of only 72. He will continue on Flomax and Sanctura XR daily and Hytrin 1 mg at night. I will see him again in February.     03/11/20, telephone call, PSA 0.60, cytology negative, urinalysis negative, urine culture no growth.     02/02/21, patient arrives alone. He continues to have excellent response to the triple medication of daytime Flomax and Sanctura XR and nighttime Hytrin 1 mg. Good flow rate of 8 cc/s with a PVR of 0. Urinalysis, urine culture and urine cytology are all negative. PSA 0.5. He will return next February.     February 8, 2022, patient arrives with his wife. He continues to have excellent flow, no urinary frequency and  minimal nocturia x2 with a combination of daily Flomax and Sanctura Exar in addition to nighttime Hytrin 1 mg. Today's flow rate 7 cc/s, total volume 61 cc, PVR 0 cc. Urinalysis and urine culture are negative. Urine cytology is pending. PSA again is 0.5. He will return in 1 year     June 30, 2022. Patient states by telephone that he is experiencing nocturia with incontinence x5. We ordered a urinalysis and urine culture and added oxybutynin IR 5 mg p.o. nightly.     July 2, 2022, flow rate 10 cc/s,  cc.     February 7, 2023, patient arrives with his wife Katrin. No flow rate today, PVR of 299. PSA 0.50 and urine culture no growth. I suspect his prostate continues to enlarge with more obstruction. I am concerned about renal function. The addition of oxybutynin at night has made a difference with nocturia x6 now down to x3. Therefore, we will continue Flomax daily. Discontinue Sanctura, increase the Hytrin to 2 mg in the oxybutynin to ER 10 mg nightly. He will return for cystoscopy, flow studies and a discussion of treatment options. We will also obtain a creatinine. He may require minimally invasive prostate surgery.     April 4, 2023, cystoscopy, flow studies and a discussion of treatment options. Cystoscopy does reveal moderately obstructed prostatic urethra and mildly elevated median lobe. Trabeculated bladder. Patient states that his flow has decreased significantly and he is now experiencing incontinence. Flow rate of 6 cc/s, total volume 34 cc,  cc. He is maintained on daily Flomax, nighttime Hytrin 2 mg with oxybutynin IR 5 mg. Creatinine is normal at 1.20 with GFR 65. I will send him to see Dr. Nakul Bird for further evaluation and possible HoLEP procedure.    May 23, 2023, office visit, Dr. Nakul Bird.  Patient states he was emptying his bladder well with a PVR of only 39 cc.  No bothersome symptoms at that point and happy with current management.  No reason to pursue surgical  intervention.    March 1, 2024, patient arrives with his wife Katrin.  He states that he now has urinary incontinence only at night.  He continues on daily Flomax and Hytrin.  However, he is independently discontinued the oxybutynin and states that is when the incontinence began.  He stated that his new insurance carrier refused to give him both Hytrin and oxybutynin at the same time.  We will reinstitute his routine.  Urinalysis is negative for blood.  Urine culture no growth.  Urine cytology is negative for malignant cells and PSA is normal at 0.66.  Today's flow rate 12 cc/s with a PVR of 137 cc.  We will reinstitute his medication regimen and have him return in a year.     PLAN:     #1 the patient will return In February 2025 with a flowrate, post void residual, urine studies and a PSA     #2 the patient will be maintained on Hytrin 2 mg and oxybutynin ER 10 mg both by mouth each bedtime, addition to Flomax 0.4 mg by mouth daily    Physical Exam  Vitals and nursing note reviewed.   Constitutional:       Appearance: Normal appearance.   HENT:      Head: Normocephalic and atraumatic.   Pulmonary:      Effort: Pulmonary effort is normal.   Abdominal:      Palpations: Abdomen is soft.      Tenderness: There is no abdominal tenderness.   Musculoskeletal:         General: Normal range of motion.      Cervical back: Normal range of motion and neck supple.   Neurological:      General: No focal deficit present.      Mental Status: He is alert and oriented to person, place, and time.   Psychiatric:         Mood and Affect: Mood normal.         Behavior: Behavior normal.         This note was created with voice-recognition software and was not corrected for typographical or grammatical errors

## 2024-03-18 ENCOUNTER — OFFICE VISIT (OUTPATIENT)
Dept: PRIMARY CARE | Facility: CLINIC | Age: 69
End: 2024-03-18
Payer: MEDICARE

## 2024-03-18 VITALS
HEART RATE: 68 BPM | HEIGHT: 66 IN | BODY MASS INDEX: 43.23 KG/M2 | WEIGHT: 269 LBS | RESPIRATION RATE: 16 BRPM | OXYGEN SATURATION: 97 % | TEMPERATURE: 96 F | DIASTOLIC BLOOD PRESSURE: 60 MMHG | SYSTOLIC BLOOD PRESSURE: 118 MMHG

## 2024-03-18 DIAGNOSIS — E78.00 HYPERCHOLESTEROLEMIA: ICD-10-CM

## 2024-03-18 DIAGNOSIS — E11.9 TYPE 2 DIABETES MELLITUS WITHOUT COMPLICATION, WITHOUT LONG-TERM CURRENT USE OF INSULIN (MULTI): Primary | ICD-10-CM

## 2024-03-18 DIAGNOSIS — I48.0 PAROXYSMAL ATRIAL FIBRILLATION WITH RVR (MULTI): ICD-10-CM

## 2024-03-18 DIAGNOSIS — I10 PRIMARY HYPERTENSION: ICD-10-CM

## 2024-03-18 PROCEDURE — 1160F RVW MEDS BY RX/DR IN RCRD: CPT | Performed by: FAMILY MEDICINE

## 2024-03-18 PROCEDURE — 4010F ACE/ARB THERAPY RXD/TAKEN: CPT | Performed by: FAMILY MEDICINE

## 2024-03-18 PROCEDURE — 3074F SYST BP LT 130 MM HG: CPT | Performed by: FAMILY MEDICINE

## 2024-03-18 PROCEDURE — 99213 OFFICE O/P EST LOW 20 MIN: CPT | Performed by: FAMILY MEDICINE

## 2024-03-18 PROCEDURE — 1159F MED LIST DOCD IN RCRD: CPT | Performed by: FAMILY MEDICINE

## 2024-03-18 PROCEDURE — 3078F DIAST BP <80 MM HG: CPT | Performed by: FAMILY MEDICINE

## 2024-03-18 PROCEDURE — 1036F TOBACCO NON-USER: CPT | Performed by: FAMILY MEDICINE

## 2024-03-18 PROCEDURE — 3044F HG A1C LEVEL LT 7.0%: CPT | Performed by: FAMILY MEDICINE

## 2024-03-18 NOTE — PROGRESS NOTES
"Subjective   Patient ID: Johnathan Akins is a 69 y.o. male who presents for Diabetes (3 month follow up ).  HPI  69-year-old diabetic with history of paroxysmal atrial fibs doing well on both metoprolol and Cardizem.  He does take for his BPH Hytrin at nighttime as well as Flomax.  Otherwise takes 3 times a day Glucotrol.  Metformin.  Does take Aldactone for peripheral edema as well as Zocor for dyslipidemia.  Recent A1c is good at 6.4 sugar 111 otherwise normal kidney and liver functions and potassium  Does try to follow a low-salt low carbohydrate low-fat diet well  Is followed Dr. Patten for earlier hematuria and has been stable at this time without symptoms  Chronic meds reviewed no reported side effects.  Started goals discussed recent cholesterol panel is stable  Review of Systems   Constitutional:  Negative for fatigue, fever and unexpected weight change.   Eyes:  Negative for visual disturbance.   Respiratory:  Negative for cough, chest tightness and shortness of breath.    Cardiovascular:  Negative for chest pain, palpitations and leg swelling.   Gastrointestinal:  Negative for abdominal pain, blood in stool and vomiting.   Genitourinary:  Positive for frequency. Negative for dysuria, flank pain and hematuria.   Musculoskeletal:  Positive for arthralgias and myalgias.   Skin:  Negative for rash.   Neurological:  Negative for weakness, light-headedness and headaches.   Psychiatric/Behavioral:  Negative for behavioral problems, confusion, sleep disturbance and suicidal ideas.        Objective   /60 (BP Location: Right arm, Patient Position: Sitting, BP Cuff Size: Large adult)   Pulse 68   Temp 35.6 °C (96 °F) (Temporal)   Resp 16   Ht 1.676 m (5' 6\")   Wt 122 kg (269 lb)   SpO2 97%   BMI 43.42 kg/m²     Lab Results   Component Value Date    HGBA1C 6.4 (A) 04/18/2023    HGBA1C 6.2 (A) 04/08/2022    HGBA1C 6.2 06/09/2021     Lab Results   Component Value Date    CREATININE 1.18 04/18/2023 " "      Chemistry    Lab Results   Component Value Date/Time     (L) 04/18/2023 1102    K 5.1 04/18/2023 1102     04/18/2023 1102    CO2 25 04/18/2023 1102    BUN 17 04/18/2023 1102    CREATININE 1.18 04/18/2023 1102    Lab Results   Component Value Date/Time    CALCIUM 9.4 04/18/2023 1102    ALKPHOS 85 04/18/2023 1102    AST 10 04/18/2023 1102    ALT 11 04/18/2023 1102    BILITOT 0.4 04/18/2023 1102        Lab Results   Component Value Date    CHOL 125 06/09/2021     Lab Results   Component Value Date    HDL 31.0 (A) 06/09/2021     No results found for: \"LDLCALC\"  Lab Results   Component Value Date    TRIG 131 06/09/2021   t: No (inaccessible in [facility])       Dx: Controlled type 2 diabetes mellitus w...    1 Result Note            Component  Ref Range & Units 2 d ago  (3/20/24) 11 mo ago  (4/18/23) 11 mo ago  (3/29/23) 1 yr ago  (2/6/23) 1 yr ago  (4/8/22) 2 yr ago  (6/9/21) 4 yr ago  (11/6/19)   Glucose  74 - 99 mg/dL 111 High  102 High              Physical Exam  Vital sign review normal heart rate regular and stable normal pulse ox  Neck neck is supple out masses adenopathy bruits or rigidity thyroid is normal  Cardiovascular very soft grade 1/2 over 6 is logics murmur otherwise no diastolic murmurs, gallop or ectopy  Pulmonary chest is clear anteriorly and posteriorly  Peripheral vascular no sensorimotor vascular deficits no symmetric or asymmetric edema  Mood-mood is stable without anxiety depressive or cognitive issues  neuroLogic-no new cranial nerve deficits no new deficit the upper and lower extremities  Earlier lab reviewed with good sugar 111 and A1c is 6.4      Assessment/Plan   Problem List Items Addressed This Visit    None  Stable rhythm at this time we will continue his above cardiac medicines follow-up with his cardiologist  Continue with Xarelto in view of atrial fibs  Continue statin therapy as well as his diabetic medicines above as well as low-salt low carbohydrate and low-fat " diet  Will come in in 3 months to get A1c at that time but otherwise clinically stable healthy routines of ER parameters  @discharge  The above diagnosis and treatment plan was discussed with the patient patient will continue appropriate diet and exercise as reviewed  Patient will recheck earlier if any interval problems of significance or clinical worsening of the above problems.  Agrees above surveillance.  All question were addressed regarding above meds

## 2024-03-20 ENCOUNTER — LAB (OUTPATIENT)
Dept: LAB | Facility: LAB | Age: 69
End: 2024-03-20
Payer: MEDICARE

## 2024-03-20 DIAGNOSIS — N18.30 CONTROLLED TYPE 2 DIABETES MELLITUS WITH STAGE 3 CHRONIC KIDNEY DISEASE, WITHOUT LONG-TERM CURRENT USE OF INSULIN (MULTI): ICD-10-CM

## 2024-03-20 DIAGNOSIS — E11.22 CONTROLLED TYPE 2 DIABETES MELLITUS WITH STAGE 3 CHRONIC KIDNEY DISEASE, WITHOUT LONG-TERM CURRENT USE OF INSULIN (MULTI): ICD-10-CM

## 2024-03-20 LAB
ALBUMIN SERPL BCP-MCNC: 3.9 G/DL (ref 3.4–5)
ALP SERPL-CCNC: 84 U/L (ref 33–136)
ALT SERPL W P-5'-P-CCNC: 11 U/L (ref 10–52)
ANION GAP SERPL CALC-SCNC: 12 MMOL/L (ref 10–20)
AST SERPL W P-5'-P-CCNC: 10 U/L (ref 9–39)
BILIRUB SERPL-MCNC: 0.5 MG/DL (ref 0–1.2)
BUN SERPL-MCNC: 20 MG/DL (ref 6–23)
CALCIUM SERPL-MCNC: 9 MG/DL (ref 8.6–10.3)
CHLORIDE SERPL-SCNC: 103 MMOL/L (ref 98–107)
CO2 SERPL-SCNC: 24 MMOL/L (ref 21–32)
CREAT SERPL-MCNC: 1.17 MG/DL (ref 0.5–1.3)
EGFRCR SERPLBLD CKD-EPI 2021: 67 ML/MIN/1.73M*2
EST. AVERAGE GLUCOSE BLD GHB EST-MCNC: 140 MG/DL
GLUCOSE SERPL-MCNC: 111 MG/DL (ref 74–99)
HBA1C MFR BLD: 6.5 %
POTASSIUM SERPL-SCNC: 5.1 MMOL/L (ref 3.5–5.3)
PROT SERPL-MCNC: 6.8 G/DL (ref 6.4–8.2)
SODIUM SERPL-SCNC: 134 MMOL/L (ref 136–145)

## 2024-03-20 PROCEDURE — 83036 HEMOGLOBIN GLYCOSYLATED A1C: CPT

## 2024-03-20 PROCEDURE — 80053 COMPREHEN METABOLIC PANEL: CPT

## 2024-03-20 PROCEDURE — 36415 COLL VENOUS BLD VENIPUNCTURE: CPT

## 2024-03-22 ASSESSMENT — ENCOUNTER SYMPTOMS
ABDOMINAL PAIN: 0
FATIGUE: 0
MYALGIAS: 1
UNEXPECTED WEIGHT CHANGE: 0
PALPITATIONS: 0
ARTHRALGIAS: 1
VOMITING: 0
COUGH: 0
DYSURIA: 0
FEVER: 0
LIGHT-HEADEDNESS: 0
WEAKNESS: 0
SLEEP DISTURBANCE: 0
BLOOD IN STOOL: 0
FLANK PAIN: 0
FREQUENCY: 1
HEADACHES: 0
HEMATURIA: 0
SHORTNESS OF BREATH: 0
CONFUSION: 0
CHEST TIGHTNESS: 0

## 2024-04-07 DIAGNOSIS — E78.00 HYPERCHOLESTEROLEMIA: ICD-10-CM

## 2024-04-09 RX ORDER — SIMVASTATIN 20 MG/1
20 TABLET, FILM COATED ORAL DAILY
Qty: 90 TABLET | Refills: 3 | Status: SHIPPED | OUTPATIENT
Start: 2024-04-09

## 2024-05-07 DIAGNOSIS — E11.22 CONTROLLED TYPE 2 DIABETES MELLITUS WITH STAGE 3 CHRONIC KIDNEY DISEASE, WITHOUT LONG-TERM CURRENT USE OF INSULIN (MULTI): ICD-10-CM

## 2024-05-07 DIAGNOSIS — N18.30 CONTROLLED TYPE 2 DIABETES MELLITUS WITH STAGE 3 CHRONIC KIDNEY DISEASE, WITHOUT LONG-TERM CURRENT USE OF INSULIN (MULTI): ICD-10-CM

## 2024-05-07 DIAGNOSIS — I10 PRIMARY HYPERTENSION: ICD-10-CM

## 2024-05-08 RX ORDER — METOPROLOL TARTRATE 50 MG/1
50 TABLET ORAL 2 TIMES DAILY
Qty: 180 TABLET | Refills: 3 | Status: SHIPPED | OUTPATIENT
Start: 2024-05-08

## 2024-05-08 RX ORDER — GLIPIZIDE 2.5 MG/1
TABLET, EXTENDED RELEASE ORAL
Qty: 360 TABLET | Refills: 3 | Status: SHIPPED | OUTPATIENT
Start: 2024-05-08

## 2024-08-04 DIAGNOSIS — I10 PRIMARY HYPERTENSION: ICD-10-CM

## 2024-08-04 DIAGNOSIS — E11.22 CONTROLLED TYPE 2 DIABETES MELLITUS WITH STAGE 3 CHRONIC KIDNEY DISEASE, WITHOUT LONG-TERM CURRENT USE OF INSULIN (MULTI): ICD-10-CM

## 2024-08-04 DIAGNOSIS — N18.30 CONTROLLED TYPE 2 DIABETES MELLITUS WITH STAGE 3 CHRONIC KIDNEY DISEASE, WITHOUT LONG-TERM CURRENT USE OF INSULIN (MULTI): ICD-10-CM

## 2024-08-05 RX ORDER — METFORMIN HYDROCHLORIDE 1000 MG/1
1000 TABLET ORAL 2 TIMES DAILY
Qty: 180 TABLET | Refills: 3 | Status: SHIPPED | OUTPATIENT
Start: 2024-08-05

## 2024-08-05 RX ORDER — SPIRONOLACTONE 25 MG/1
25 TABLET ORAL DAILY
Qty: 90 TABLET | Refills: 3 | Status: SHIPPED | OUTPATIENT
Start: 2024-08-05

## 2024-08-07 ENCOUNTER — LAB (OUTPATIENT)
Dept: LAB | Facility: LAB | Age: 69
End: 2024-08-07
Payer: MEDICARE

## 2024-08-07 DIAGNOSIS — E11.22 CONTROLLED TYPE 2 DIABETES MELLITUS WITH STAGE 3 CHRONIC KIDNEY DISEASE, WITHOUT LONG-TERM CURRENT USE OF INSULIN (MULTI): ICD-10-CM

## 2024-08-07 DIAGNOSIS — N18.30 CONTROLLED TYPE 2 DIABETES MELLITUS WITH STAGE 3 CHRONIC KIDNEY DISEASE, WITHOUT LONG-TERM CURRENT USE OF INSULIN (MULTI): ICD-10-CM

## 2024-08-07 LAB
EST. AVERAGE GLUCOSE BLD GHB EST-MCNC: 157 MG/DL
HBA1C MFR BLD: 7.1 %

## 2024-08-07 PROCEDURE — 83036 HEMOGLOBIN GLYCOSYLATED A1C: CPT

## 2024-08-07 PROCEDURE — 36415 COLL VENOUS BLD VENIPUNCTURE: CPT

## 2024-08-08 ENCOUNTER — APPOINTMENT (OUTPATIENT)
Dept: CARDIOLOGY | Facility: HOSPITAL | Age: 69
DRG: 309 | End: 2024-08-08
Payer: MEDICARE

## 2024-08-08 ENCOUNTER — APPOINTMENT (OUTPATIENT)
Dept: RADIOLOGY | Facility: HOSPITAL | Age: 69
DRG: 309 | End: 2024-08-08
Payer: MEDICARE

## 2024-08-08 ENCOUNTER — HOSPITAL ENCOUNTER (INPATIENT)
Facility: HOSPITAL | Age: 69
LOS: 3 days | Discharge: HOME | End: 2024-08-11
Attending: EMERGENCY MEDICINE | Admitting: EMERGENCY MEDICINE
Payer: MEDICARE

## 2024-08-08 ENCOUNTER — TELEPHONE (OUTPATIENT)
Dept: PRIMARY CARE | Facility: CLINIC | Age: 69
End: 2024-08-08
Payer: MEDICARE

## 2024-08-08 DIAGNOSIS — I48.0 PAROXYSMAL ATRIAL FIBRILLATION WITH RVR (MULTI): ICD-10-CM

## 2024-08-08 DIAGNOSIS — I48.92 ATRIAL FLUTTER, UNSPECIFIED TYPE (MULTI): Primary | ICD-10-CM

## 2024-08-08 DIAGNOSIS — T50.905A MEDICATION INDUCED COAGULOPATHY (MULTI): ICD-10-CM

## 2024-08-08 DIAGNOSIS — D68.9 MEDICATION INDUCED COAGULOPATHY (MULTI): ICD-10-CM

## 2024-08-08 DIAGNOSIS — I48.19 OTHER PERSISTENT ATRIAL FIBRILLATION (MULTI): ICD-10-CM

## 2024-08-08 DIAGNOSIS — D64.9 ANEMIA, UNSPECIFIED TYPE: ICD-10-CM

## 2024-08-08 DIAGNOSIS — R33.9 RETENTION OF URINE: ICD-10-CM

## 2024-08-08 DIAGNOSIS — M79.89 LEFT LEG SWELLING: ICD-10-CM

## 2024-08-08 LAB
ALBUMIN SERPL BCP-MCNC: 3.7 G/DL (ref 3.4–5)
ALBUMIN SERPL BCP-MCNC: 3.8 G/DL (ref 3.4–5)
ALP SERPL-CCNC: 68 U/L (ref 33–136)
ALP SERPL-CCNC: 74 U/L (ref 33–136)
ALT SERPL W P-5'-P-CCNC: 8 U/L (ref 10–52)
ALT SERPL W P-5'-P-CCNC: 9 U/L (ref 10–52)
ANION GAP SERPL CALC-SCNC: 13 MMOL/L (ref 10–20)
ANION GAP SERPL CALC-SCNC: 13 MMOL/L (ref 10–20)
APTT PPP: 47 SECONDS (ref 27–38)
AST SERPL W P-5'-P-CCNC: 11 U/L (ref 9–39)
AST SERPL W P-5'-P-CCNC: 12 U/L (ref 9–39)
BASOPHILS # BLD AUTO: 0.03 X10*3/UL (ref 0–0.1)
BASOPHILS NFR BLD AUTO: 0.3 %
BILIRUB SERPL-MCNC: 0.6 MG/DL (ref 0–1.2)
BILIRUB SERPL-MCNC: 0.7 MG/DL (ref 0–1.2)
BNP SERPL-MCNC: 63 PG/ML (ref 0–99)
BUN SERPL-MCNC: 24 MG/DL (ref 6–23)
BUN SERPL-MCNC: 28 MG/DL (ref 6–23)
CALCIUM SERPL-MCNC: 8.9 MG/DL (ref 8.6–10.3)
CALCIUM SERPL-MCNC: 9.3 MG/DL (ref 8.6–10.3)
CARDIAC TROPONIN I PNL SERPL HS: 4 NG/L (ref 0–20)
CARDIAC TROPONIN I PNL SERPL HS: 5 NG/L (ref 0–20)
CHLORIDE SERPL-SCNC: 103 MMOL/L (ref 98–107)
CHLORIDE SERPL-SCNC: 105 MMOL/L (ref 98–107)
CK SERPL-CCNC: 85 U/L (ref 0–325)
CO2 SERPL-SCNC: 22 MMOL/L (ref 21–32)
CO2 SERPL-SCNC: 22 MMOL/L (ref 21–32)
CREAT SERPL-MCNC: 1.18 MG/DL (ref 0.5–1.3)
CREAT SERPL-MCNC: 1.25 MG/DL (ref 0.5–1.3)
CRP SERPL-MCNC: 3.44 MG/DL
EGFRCR SERPLBLD CKD-EPI 2021: 62 ML/MIN/1.73M*2
EGFRCR SERPLBLD CKD-EPI 2021: 66 ML/MIN/1.73M*2
EOSINOPHIL # BLD AUTO: 0.08 X10*3/UL (ref 0–0.4)
EOSINOPHIL NFR BLD AUTO: 0.8 %
ERYTHROCYTE [DISTWIDTH] IN BLOOD BY AUTOMATED COUNT: 15.8 % (ref 11.5–14.5)
ERYTHROCYTE [DISTWIDTH] IN BLOOD BY AUTOMATED COUNT: 15.9 % (ref 11.5–14.5)
ERYTHROCYTE [SEDIMENTATION RATE] IN BLOOD BY WESTERGREN METHOD: 81 MM/H (ref 0–20)
GLUCOSE BLD MANUAL STRIP-MCNC: 99 MG/DL (ref 74–99)
GLUCOSE SERPL-MCNC: 115 MG/DL (ref 74–99)
GLUCOSE SERPL-MCNC: 78 MG/DL (ref 74–99)
HCT VFR BLD AUTO: 30.7 % (ref 41–52)
HCT VFR BLD AUTO: 33.4 % (ref 41–52)
HGB BLD-MCNC: 10 G/DL (ref 13.5–17.5)
HGB BLD-MCNC: 10.7 G/DL (ref 13.5–17.5)
IMM GRANULOCYTES # BLD AUTO: 0.06 X10*3/UL (ref 0–0.5)
IMM GRANULOCYTES NFR BLD AUTO: 0.6 % (ref 0–0.9)
INR PPP: 2.2 (ref 0.9–1.1)
LACTATE SERPL-SCNC: 1.4 MMOL/L (ref 0.4–2)
LYMPHOCYTES # BLD AUTO: 1.54 X10*3/UL (ref 0.8–3)
LYMPHOCYTES NFR BLD AUTO: 15.3 %
MAGNESIUM SERPL-MCNC: 1.52 MG/DL (ref 1.6–2.4)
MAGNESIUM SERPL-MCNC: 1.59 MG/DL (ref 1.6–2.4)
MCH RBC QN AUTO: 26.9 PG (ref 26–34)
MCH RBC QN AUTO: 27.2 PG (ref 26–34)
MCHC RBC AUTO-ENTMCNC: 32 G/DL (ref 32–36)
MCHC RBC AUTO-ENTMCNC: 32.6 G/DL (ref 32–36)
MCV RBC AUTO: 83 FL (ref 80–100)
MCV RBC AUTO: 84 FL (ref 80–100)
MONOCYTES # BLD AUTO: 0.74 X10*3/UL (ref 0.05–0.8)
MONOCYTES NFR BLD AUTO: 7.3 %
NEUTROPHILS # BLD AUTO: 7.64 X10*3/UL (ref 1.6–5.5)
NEUTROPHILS NFR BLD AUTO: 75.7 %
NRBC BLD-RTO: 0 /100 WBCS (ref 0–0)
NRBC BLD-RTO: 0 /100 WBCS (ref 0–0)
PHOSPHATE SERPL-MCNC: 3.3 MG/DL (ref 2.5–4.9)
PLATELET # BLD AUTO: 232 X10*3/UL (ref 150–450)
PLATELET # BLD AUTO: 236 X10*3/UL (ref 150–450)
POTASSIUM SERPL-SCNC: 4.4 MMOL/L (ref 3.5–5.3)
POTASSIUM SERPL-SCNC: 4.7 MMOL/L (ref 3.5–5.3)
PROT SERPL-MCNC: 6.4 G/DL (ref 6.4–8.2)
PROT SERPL-MCNC: 7.2 G/DL (ref 6.4–8.2)
PROTHROMBIN TIME: 25.5 SECONDS (ref 9.8–12.8)
RBC # BLD AUTO: 3.68 X10*6/UL (ref 4.5–5.9)
RBC # BLD AUTO: 3.98 X10*6/UL (ref 4.5–5.9)
SODIUM SERPL-SCNC: 134 MMOL/L (ref 136–145)
SODIUM SERPL-SCNC: 135 MMOL/L (ref 136–145)
WBC # BLD AUTO: 10.1 X10*3/UL (ref 4.4–11.3)
WBC # BLD AUTO: 9.9 X10*3/UL (ref 4.4–11.3)

## 2024-08-08 PROCEDURE — 75635 CT ANGIO ABDOMINAL ARTERIES: CPT | Performed by: RADIOLOGY

## 2024-08-08 PROCEDURE — 36415 COLL VENOUS BLD VENIPUNCTURE: CPT | Performed by: EMERGENCY MEDICINE

## 2024-08-08 PROCEDURE — 85652 RBC SED RATE AUTOMATED: CPT

## 2024-08-08 PROCEDURE — 71045 X-RAY EXAM CHEST 1 VIEW: CPT

## 2024-08-08 PROCEDURE — 85730 THROMBOPLASTIN TIME PARTIAL: CPT | Performed by: EMERGENCY MEDICINE

## 2024-08-08 PROCEDURE — 73560 X-RAY EXAM OF KNEE 1 OR 2: CPT | Mod: LEFT SIDE | Performed by: RADIOLOGY

## 2024-08-08 PROCEDURE — 86140 C-REACTIVE PROTEIN: CPT

## 2024-08-08 PROCEDURE — 2500000002 HC RX 250 W HCPCS SELF ADMINISTERED DRUGS (ALT 637 FOR MEDICARE OP, ALT 636 FOR OP/ED)

## 2024-08-08 PROCEDURE — 96376 TX/PRO/DX INJ SAME DRUG ADON: CPT

## 2024-08-08 PROCEDURE — 80053 COMPREHEN METABOLIC PANEL: CPT

## 2024-08-08 PROCEDURE — 73560 X-RAY EXAM OF KNEE 1 OR 2: CPT | Mod: LT

## 2024-08-08 PROCEDURE — 93971 EXTREMITY STUDY: CPT

## 2024-08-08 PROCEDURE — 83735 ASSAY OF MAGNESIUM: CPT

## 2024-08-08 PROCEDURE — 93005 ELECTROCARDIOGRAM TRACING: CPT

## 2024-08-08 PROCEDURE — 84484 ASSAY OF TROPONIN QUANT: CPT | Performed by: EMERGENCY MEDICINE

## 2024-08-08 PROCEDURE — 2500000004 HC RX 250 GENERAL PHARMACY W/ HCPCS (ALT 636 FOR OP/ED)

## 2024-08-08 PROCEDURE — 2500000004 HC RX 250 GENERAL PHARMACY W/ HCPCS (ALT 636 FOR OP/ED): Performed by: EMERGENCY MEDICINE

## 2024-08-08 PROCEDURE — 2500000001 HC RX 250 WO HCPCS SELF ADMINISTERED DRUGS (ALT 637 FOR MEDICARE OP)

## 2024-08-08 PROCEDURE — 85027 COMPLETE CBC AUTOMATED: CPT

## 2024-08-08 PROCEDURE — 71045 X-RAY EXAM CHEST 1 VIEW: CPT | Mod: FOREIGN READ | Performed by: RADIOLOGY

## 2024-08-08 PROCEDURE — 96374 THER/PROPH/DIAG INJ IV PUSH: CPT

## 2024-08-08 PROCEDURE — 82550 ASSAY OF CK (CPK): CPT

## 2024-08-08 PROCEDURE — 99285 EMERGENCY DEPT VISIT HI MDM: CPT | Mod: 25

## 2024-08-08 PROCEDURE — 83605 ASSAY OF LACTIC ACID: CPT | Performed by: EMERGENCY MEDICINE

## 2024-08-08 PROCEDURE — 2020000001 HC ICU ROOM DAILY

## 2024-08-08 PROCEDURE — 99291 CRITICAL CARE FIRST HOUR: CPT

## 2024-08-08 PROCEDURE — 82947 ASSAY GLUCOSE BLOOD QUANT: CPT

## 2024-08-08 PROCEDURE — 85610 PROTHROMBIN TIME: CPT | Performed by: EMERGENCY MEDICINE

## 2024-08-08 PROCEDURE — 80053 COMPREHEN METABOLIC PANEL: CPT | Performed by: EMERGENCY MEDICINE

## 2024-08-08 PROCEDURE — 93971 EXTREMITY STUDY: CPT | Mod: FOREIGN READ | Performed by: RADIOLOGY

## 2024-08-08 PROCEDURE — 36415 COLL VENOUS BLD VENIPUNCTURE: CPT

## 2024-08-08 PROCEDURE — 2550000001 HC RX 255 CONTRASTS: Performed by: EMERGENCY MEDICINE

## 2024-08-08 PROCEDURE — 85025 COMPLETE CBC W/AUTO DIFF WBC: CPT | Performed by: EMERGENCY MEDICINE

## 2024-08-08 PROCEDURE — 73552 X-RAY EXAM OF FEMUR 2/>: CPT | Mod: LEFT SIDE | Performed by: RADIOLOGY

## 2024-08-08 PROCEDURE — 83880 ASSAY OF NATRIURETIC PEPTIDE: CPT | Performed by: EMERGENCY MEDICINE

## 2024-08-08 PROCEDURE — 2500000005 HC RX 250 GENERAL PHARMACY W/O HCPCS: Performed by: EMERGENCY MEDICINE

## 2024-08-08 PROCEDURE — 83735 ASSAY OF MAGNESIUM: CPT | Performed by: EMERGENCY MEDICINE

## 2024-08-08 PROCEDURE — 75635 CT ANGIO ABDOMINAL ARTERIES: CPT

## 2024-08-08 PROCEDURE — 73590 X-RAY EXAM OF LOWER LEG: CPT | Mod: LEFT SIDE | Performed by: RADIOLOGY

## 2024-08-08 PROCEDURE — 73552 X-RAY EXAM OF FEMUR 2/>: CPT | Mod: LT

## 2024-08-08 PROCEDURE — 84100 ASSAY OF PHOSPHORUS: CPT

## 2024-08-08 PROCEDURE — 73590 X-RAY EXAM OF LOWER LEG: CPT | Mod: LT

## 2024-08-08 RX ORDER — DEXTROSE 50 % IN WATER (D50W) INTRAVENOUS SYRINGE
12.5
Status: DISCONTINUED | OUTPATIENT
Start: 2024-08-08 | End: 2024-08-11 | Stop reason: HOSPADM

## 2024-08-08 RX ORDER — TROSPIUM CHLORIDE 20 MG/1
60 TABLET, FILM COATED ORAL DAILY
COMMUNITY

## 2024-08-08 RX ORDER — DILTIAZEM HCL/D5W 125 MG/125
5-15 PLASTIC BAG, INJECTION (ML) INTRAVENOUS CONTINUOUS
Status: DISCONTINUED | OUTPATIENT
Start: 2024-08-08 | End: 2024-08-08

## 2024-08-08 RX ORDER — METFORMIN HYDROCHLORIDE 500 MG/1
1000 TABLET ORAL 2 TIMES DAILY
Status: DISCONTINUED | OUTPATIENT
Start: 2024-08-08 | End: 2024-08-11 | Stop reason: HOSPADM

## 2024-08-08 RX ORDER — TERAZOSIN 1 MG/1
2 CAPSULE ORAL NIGHTLY
Status: DISCONTINUED | OUTPATIENT
Start: 2024-08-08 | End: 2024-08-11 | Stop reason: HOSPADM

## 2024-08-08 RX ORDER — LISINOPRIL 5 MG/1
5 TABLET ORAL DAILY
Status: DISCONTINUED | OUTPATIENT
Start: 2024-08-08 | End: 2024-08-11 | Stop reason: HOSPADM

## 2024-08-08 RX ORDER — GLIPIZIDE 2.5 MG/1
2.5 TABLET, EXTENDED RELEASE ORAL
Status: DISCONTINUED | OUTPATIENT
Start: 2024-08-09 | End: 2024-08-11 | Stop reason: HOSPADM

## 2024-08-08 RX ORDER — SPIRONOLACTONE 25 MG/1
25 TABLET ORAL DAILY
Status: DISCONTINUED | OUTPATIENT
Start: 2024-08-08 | End: 2024-08-11 | Stop reason: HOSPADM

## 2024-08-08 RX ORDER — ACETAMINOPHEN 325 MG/1
650 TABLET ORAL EVERY 6 HOURS PRN
Status: DISCONTINUED | OUTPATIENT
Start: 2024-08-08 | End: 2024-08-11 | Stop reason: HOSPADM

## 2024-08-08 RX ORDER — MAGNESIUM SULFATE HEPTAHYDRATE 40 MG/ML
2 INJECTION, SOLUTION INTRAVENOUS ONCE
Status: COMPLETED | OUTPATIENT
Start: 2024-08-08 | End: 2024-08-08

## 2024-08-08 RX ORDER — DILTIAZEM HYDROCHLORIDE 300 MG/1
300 CAPSULE, COATED, EXTENDED RELEASE ORAL DAILY
Status: DISCONTINUED | OUTPATIENT
Start: 2024-08-08 | End: 2024-08-11 | Stop reason: HOSPADM

## 2024-08-08 RX ORDER — TAMSULOSIN HYDROCHLORIDE 0.4 MG/1
0.4 CAPSULE ORAL DAILY
Status: DISCONTINUED | OUTPATIENT
Start: 2024-08-08 | End: 2024-08-11 | Stop reason: HOSPADM

## 2024-08-08 RX ORDER — DILTIAZEM HYDROCHLORIDE 5 MG/ML
20 INJECTION INTRAVENOUS ONCE
Status: COMPLETED | OUTPATIENT
Start: 2024-08-08 | End: 2024-08-08

## 2024-08-08 RX ORDER — INSULIN LISPRO 100 [IU]/ML
0-10 INJECTION, SOLUTION INTRAVENOUS; SUBCUTANEOUS
Status: DISCONTINUED | OUTPATIENT
Start: 2024-08-08 | End: 2024-08-11 | Stop reason: HOSPADM

## 2024-08-08 RX ORDER — DIGOXIN 0.25 MG/ML
500 INJECTION INTRAMUSCULAR; INTRAVENOUS ONCE
Status: COMPLETED | OUTPATIENT
Start: 2024-08-08 | End: 2024-08-08

## 2024-08-08 RX ORDER — OXYBUTYNIN CHLORIDE 5 MG/1
5 TABLET ORAL NIGHTLY
Status: DISCONTINUED | OUTPATIENT
Start: 2024-08-08 | End: 2024-08-11 | Stop reason: HOSPADM

## 2024-08-08 RX ORDER — PANTOPRAZOLE SODIUM 40 MG/1
40 TABLET, DELAYED RELEASE ORAL
Status: DISCONTINUED | OUTPATIENT
Start: 2024-08-09 | End: 2024-08-11 | Stop reason: HOSPADM

## 2024-08-08 RX ORDER — DEXTROSE 50 % IN WATER (D50W) INTRAVENOUS SYRINGE
25
Status: DISCONTINUED | OUTPATIENT
Start: 2024-08-08 | End: 2024-08-11 | Stop reason: HOSPADM

## 2024-08-08 RX ORDER — SIMVASTATIN 20 MG/1
20 TABLET, FILM COATED ORAL DAILY
Status: DISCONTINUED | OUTPATIENT
Start: 2024-08-08 | End: 2024-08-11 | Stop reason: HOSPADM

## 2024-08-08 RX ORDER — METOPROLOL TARTRATE 50 MG/1
50 TABLET ORAL 2 TIMES DAILY
Status: DISCONTINUED | OUTPATIENT
Start: 2024-08-08 | End: 2024-08-11 | Stop reason: HOSPADM

## 2024-08-08 RX ORDER — OXYBUTYNIN CHLORIDE 5 MG/1
5 TABLET ORAL NIGHTLY
Qty: 90 TABLET | Refills: 3 | Status: SHIPPED | OUTPATIENT
Start: 2024-08-08

## 2024-08-08 RX ADMIN — MAGNESIUM SULFATE HEPTAHYDRATE 2 G: 40 INJECTION, SOLUTION INTRAVENOUS at 19:08

## 2024-08-08 RX ADMIN — DILTIAZEM HYDROCHLORIDE 20 MG: 5 INJECTION INTRAVENOUS at 14:29

## 2024-08-08 RX ADMIN — DIGOXIN 500 MCG: 0.25 INJECTION INTRAMUSCULAR; INTRAVENOUS at 16:56

## 2024-08-08 RX ADMIN — TERAZOSIN HYDROCHLORIDE 2 MG: 1 CAPSULE ORAL at 20:41

## 2024-08-08 RX ADMIN — AMIODARONE HYDROCHLORIDE 1 MG/MIN: 1.8 INJECTION, SOLUTION INTRAVENOUS at 21:12

## 2024-08-08 RX ADMIN — DILTIAZEM HYDROCHLORIDE 20 MG: 5 INJECTION INTRAVENOUS at 15:45

## 2024-08-08 RX ADMIN — OXYBUTYNIN CHLORIDE 5 MG: 5 TABLET ORAL at 20:41

## 2024-08-08 RX ADMIN — Medication 5 MG/HR: at 14:43

## 2024-08-08 RX ADMIN — SIMVASTATIN 20 MG: 20 TABLET, FILM COATED ORAL at 20:41

## 2024-08-08 RX ADMIN — IOHEXOL 100 ML: 350 INJECTION, SOLUTION INTRAVENOUS at 18:09

## 2024-08-08 RX ADMIN — METOPROLOL TARTRATE 50 MG: 50 TABLET, FILM COATED ORAL at 20:41

## 2024-08-08 SDOH — SOCIAL STABILITY: SOCIAL INSECURITY: ARE YOU OR HAVE YOU BEEN THREATENED OR ABUSED PHYSICALLY, EMOTIONALLY, OR SEXUALLY BY ANYONE?: NO

## 2024-08-08 SDOH — SOCIAL STABILITY: SOCIAL INSECURITY: ABUSE: ADULT

## 2024-08-08 SDOH — SOCIAL STABILITY: SOCIAL INSECURITY: DO YOU FEEL UNSAFE GOING BACK TO THE PLACE WHERE YOU ARE LIVING?: NO

## 2024-08-08 SDOH — SOCIAL STABILITY: SOCIAL INSECURITY: WERE YOU ABLE TO COMPLETE ALL THE BEHAVIORAL HEALTH SCREENINGS?: YES

## 2024-08-08 SDOH — SOCIAL STABILITY: SOCIAL INSECURITY: DOES ANYONE TRY TO KEEP YOU FROM HAVING/CONTACTING OTHER FRIENDS OR DOING THINGS OUTSIDE YOUR HOME?: NO

## 2024-08-08 SDOH — SOCIAL STABILITY: SOCIAL INSECURITY: HAVE YOU HAD ANY THOUGHTS OF HARMING ANYONE ELSE?: NO

## 2024-08-08 SDOH — SOCIAL STABILITY: SOCIAL INSECURITY: ARE THERE ANY APPARENT SIGNS OF INJURIES/BEHAVIORS THAT COULD BE RELATED TO ABUSE/NEGLECT?: NO

## 2024-08-08 SDOH — SOCIAL STABILITY: SOCIAL INSECURITY: HAS ANYONE EVER THREATENED TO HURT YOUR FAMILY OR YOUR PETS?: NO

## 2024-08-08 SDOH — SOCIAL STABILITY: SOCIAL INSECURITY: DO YOU FEEL ANYONE HAS EXPLOITED OR TAKEN ADVANTAGE OF YOU FINANCIALLY OR OF YOUR PERSONAL PROPERTY?: NO

## 2024-08-08 SDOH — SOCIAL STABILITY: SOCIAL INSECURITY: HAVE YOU HAD THOUGHTS OF HARMING ANYONE ELSE?: NO

## 2024-08-08 ASSESSMENT — ACTIVITIES OF DAILY LIVING (ADL)
HEARING - LEFT EAR: FUNCTIONAL
GROOMING: INDEPENDENT
ADEQUATE_TO_COMPLETE_ADL: YES
JUDGMENT_ADEQUATE_SAFELY_COMPLETE_DAILY_ACTIVITIES: YES
PATIENT'S MEMORY ADEQUATE TO SAFELY COMPLETE DAILY ACTIVITIES?: YES
HEARING - RIGHT EAR: FUNCTIONAL
WALKS IN HOME: INDEPENDENT
LACK_OF_TRANSPORTATION: NO
BATHING: INDEPENDENT
DRESSING YOURSELF: INDEPENDENT
TOILETING: INDEPENDENT
FEEDING YOURSELF: INDEPENDENT

## 2024-08-08 ASSESSMENT — PAIN - FUNCTIONAL ASSESSMENT
PAIN_FUNCTIONAL_ASSESSMENT: 0-10

## 2024-08-08 ASSESSMENT — COGNITIVE AND FUNCTIONAL STATUS - GENERAL
PATIENT BASELINE BEDBOUND: NO
MOBILITY SCORE: 24
DAILY ACTIVITIY SCORE: 24

## 2024-08-08 ASSESSMENT — LIFESTYLE VARIABLES
SKIP TO QUESTIONS 9-10: 1
EVER FELT BAD OR GUILTY ABOUT YOUR DRINKING: NO
HOW MANY STANDARD DRINKS CONTAINING ALCOHOL DO YOU HAVE ON A TYPICAL DAY: 1 OR 2
EVER HAD A DRINK FIRST THING IN THE MORNING TO STEADY YOUR NERVES TO GET RID OF A HANGOVER: NO
HOW OFTEN DO YOU HAVE A DRINK CONTAINING ALCOHOL: MONTHLY OR LESS
TOTAL SCORE: 0
AUDIT-C TOTAL SCORE: 1
HAVE PEOPLE ANNOYED YOU BY CRITICIZING YOUR DRINKING: NO
HOW OFTEN DO YOU HAVE 6 OR MORE DRINKS ON ONE OCCASION: NEVER
AUDIT-C TOTAL SCORE: 1
HAVE YOU EVER FELT YOU SHOULD CUT DOWN ON YOUR DRINKING: NO

## 2024-08-08 ASSESSMENT — PATIENT HEALTH QUESTIONNAIRE - PHQ9
SUM OF ALL RESPONSES TO PHQ9 QUESTIONS 1 & 2: 0
1. LITTLE INTEREST OR PLEASURE IN DOING THINGS: NOT AT ALL
2. FEELING DOWN, DEPRESSED OR HOPELESS: NOT AT ALL

## 2024-08-08 ASSESSMENT — COLUMBIA-SUICIDE SEVERITY RATING SCALE - C-SSRS
6. HAVE YOU EVER DONE ANYTHING, STARTED TO DO ANYTHING, OR PREPARED TO DO ANYTHING TO END YOUR LIFE?: NO
2. HAVE YOU ACTUALLY HAD ANY THOUGHTS OF KILLING YOURSELF?: NO
1. IN THE PAST MONTH, HAVE YOU WISHED YOU WERE DEAD OR WISHED YOU COULD GO TO SLEEP AND NOT WAKE UP?: NO

## 2024-08-08 ASSESSMENT — PAIN SCALES - GENERAL
PAINLEVEL_OUTOF10: 0 - NO PAIN

## 2024-08-08 NOTE — TELEPHONE ENCOUNTER
I SPOKE WITH THE PATIENT'S DAUGHTER. HE IS CURRENT AT THE HOSPITAL AND IN THE PROCESS OF SPEAKING WITH AN NP.

## 2024-08-08 NOTE — TELEPHONE ENCOUNTER
The patient stopped into the office today for an appointment that is not until the following week.   He presented with visible bruising of the left knee area. He states he did not hit it on anything and that it is not painful.  It is swollen and has bruising from the top of the knee to lower area traveling down about ten to fourteen inches as it wraps around the back and lower portion of the outerside of that knee.  This is the knee that contains the artificial replacement.  He wanted to know if the doctor had an opening in the schedule for tomorrow, in which he do not.  I advised him to go to the hospital.

## 2024-08-08 NOTE — H&P
[facility] Critical Care Medicine       Date:  8/8/2024  Patient:  Johnathan Akins  YOB: 1955  MRN:  [id number]   Admit Date:  8/8/2024  ========================================================================================================    Chief Complaint   Patient presents with    Leg Injury     Left leg ecchymosis from getting out of bed.  QG=815.          History of Present Illness:  Johnathan Akins is a 69 y.o. year old male patient with Past Medical History of paroxysmal atrial fibrillation (on Xarelto), BPH, GERD, DM2 who presented to [facility] emergency department 8/8 with complaints of left lower extremity swelling and bruising.  Per patient he was feeling at his baseline up until Sunday 8/4 when he woke up in the morning and had left lower extremity pain.  At that time he thought that he strained a muscle and did not seek treatment.  Over the next few days he noticed that leg had worsening pain, swelling, and bruising that was extending towards his groin therefore he sought medical treatment emergency department.  He denies any injury to the left lower extremity.  Does have a history of total knee arthroplasty to the left knee.  Patient denies any recent fevers or chills.  Denies any dizziness or syncope.  Denies any chest pain.  Does complain of shortness of breath on exertion and stated this started about a month ago.  Believes he has been in atrial fibrillation/atrial flutter for about 1 month, but states up to this point he has not been in A-fib for approximately 5 years.  Last dose of Xarelto was the morning of 8/8.  He follows with Dr. Montoya emergency.  Denies any nausea or vomiting.  Denies any urinary symptoms.    ED Course  -Vitals: T36.8, , /85, SpO2 90% on room air  -Labs: BBC 10.1, Hgb 10.7, , INR 2.2, Cr 1.25, BUN 28, Mg 1.59, lactate 1.4, BNP 63  -EKG: Atrial flutter with rate 140-150  -Imaging:     -CXR: Unremarkable     -X-ray left femur, knee,  tib-fib: No acute findings     -LLE venous duplex: No evidence of DVT    Patient was given a total of 40 mg diltiazem IV push and initial diltiazem infusion.  He was also given 500 mcg digoxin injection.  Heart rate remained 140 but patient was hemodynamically stable.  Patient admitted to the ICU for further care.      Interval ICU Events:  : Admitted to ICU in the setting of atrial flutter.  Hemodynamically stable.  On amiodarone infusion.  Continue home metoprolol.  Noted to have LLE swelling and bruising.  Will obtain CT of left lower extremity to rule out joint effusion or vascular injury.  Hold Xarelto until active bleed is ruled out.  Consult placed to [facility] cardiology Dr. Ron.    Medical History:  Past Medical History:   Diagnosis Date    Cardiac arrhythmia, unspecified     Irregular heartbeat    Personal history of other diseases of the respiratory system 2019    History of acute bronchitis    Personal history of other specified conditions 2022    History of gross hematuria    Personal history of other specified conditions 2021    History of urinary retention    Personal history of other specified conditions 2021    History of urinary urgency    Personal history of urinary (tract) infections 2021    History of urinary tract infection     Past Surgical History:   Procedure Laterality Date    CYSTOSCOPY  2015    Diagnostic Cystoscopy    KNEE SURGERY  2015    Knee Surgery    OTHER SURGICAL HISTORY  2015    Complex Bladder Flow Rate Studies     (Not in a hospital admission)    Benzonatate  Social History     Tobacco Use    Smoking status: Former     Current packs/day: 0.00     Average packs/day: 1 pack/day for 25.0 years (25.0 ttl pk-yrs)     Types: Cigarettes     Start date:      Quit date:      Years since quittin.6     Passive exposure: Past    Smokeless tobacco: Never   Substance Use Topics    Alcohol use: Not Currently    Drug use: Never      Family History   Problem Relation Name Age of Onset    Prostate cancer Father      Lung cancer Brother      Esophageal cancer Brother         [facility] Medications:    dilTIAZem, 5-15 mg/hr, Last Rate: 5 mg/hr (08/08/24 1443)          Current Facility-Administered Medications:     dilTIAZem (Cardizem) 125 mg in dextrose 5% 125 mL (1 mg/mL) infusion (premix), 5-15 mg/hr, intravenous, Continuous, Marcelino OLIVAS MD, Last Rate: 5 mL/hr at 08/08/24 1443, 5 mg/hr at 08/08/24 1443    magnesium sulfate 2 g in sterile water for injection 50 mL, 2 g, intravenous, Once, Dhara Still, APRN-CNP    Current Outpatient Medications:     dilTIAZem ER (Tiazac) 300 mg 24 hr capsule, Take 1 capsule (300 mg) by mouth once daily., Disp: , Rfl:     glipiZIDE XL (Glucotrol XL) 2.5 mg 24 hr tablet, TAKE 1 TABLET BY MOUTH 3 TIMES  DAILY BEFORE EACH MEAL AND 1  TABLET BY MOUTH AT BEDTIME, Disp: 360 tablet, Rfl: 3    lisinopril 5 mg tablet, Take 1 tablet (5 mg) by mouth once daily., Disp: 90 tablet, Rfl: 3    metFORMIN (Glucophage) 1,000 mg tablet, TAKE 1 TABLET BY MOUTH TWICE  DAILY, Disp: 180 tablet, Rfl: 3    metoprolol tartrate (Lopressor) 50 mg tablet, TAKE 1 TABLET BY MOUTH TWICE  DAILY, Disp: 180 tablet, Rfl: 3    omeprazole (PriLOSEC) 40 mg DR capsule, Take by mouth., Disp: , Rfl:     oxybutynin (Ditropan) 5 mg tablet, Take 1 tablet (5 mg) by mouth once daily at bedtime., Disp: , Rfl:     rivaroxaban (Xarelto) 20 mg tablet, Take 1 tablet (20 mg) by mouth once daily., Disp: , Rfl:     simvastatin (Zocor) 20 mg tablet, TAKE 1 TABLET BY MOUTH DAILY, Disp: 90 tablet, Rfl: 3    spironolactone (Aldactone) 25 mg tablet, TAKE 1 TABLET BY MOUTH DAILY, Disp: 90 tablet, Rfl: 3    tamsulosin (Flomax) 0.4 mg 24 hr capsule, Take 1 capsule (0.4 mg) by mouth once daily., Disp: 90 capsule, Rfl: 1    terazosin (Hytrin) 2 mg capsule, Take 1 capsule (2 mg) by mouth once daily at bedtime., Disp: 90 capsule, Rfl: 3    trospium (Sanctura) 20 mg  "tablet, Take 3 tablets (60 mg) by mouth once daily., Disp: , Rfl:     Review of Systems:  14 point review of systems was completed and negative except for those specially mention in my HPI    Physical Exam:    Heart Rate:  [117-153]   Temperature:  [36.8 °C (98.2 °F)]   Respirations:  []   BP: (101-141)/(67-85)   Height:  [172.7 cm (5' 8\")]   Weight:  [122 kg (268 lb)-122 kg (268 lb 15.4 oz)]   Pulse Ox:  [93 %-98 %]     Physical Exam  Constitutional:       General: He is not in acute distress.     Appearance: He is obese. He is not ill-appearing or diaphoretic.   HENT:      Head: Normocephalic and atraumatic.      Mouth/Throat:      Mouth: Mucous membranes are moist.      Pharynx: Oropharynx is clear. No oropharyngeal exudate.   Eyes:      General: No scleral icterus.     Extraocular Movements: Extraocular movements intact.      Pupils: Pupils are equal, round, and reactive to light.   Cardiovascular:      Rate and Rhythm: Tachycardia present. Rhythm irregular.      Pulses: Normal pulses.      Heart sounds: Normal heart sounds. No murmur heard.     No friction rub. No gallop.      Comments: Atrial flutter  Pulmonary:      Effort: Pulmonary effort is normal. No respiratory distress.      Breath sounds: Normal breath sounds. No wheezing or rales.   Abdominal:      General: There is no distension.      Palpations: Abdomen is soft.      Tenderness: There is no abdominal tenderness. There is no right CVA tenderness or guarding.   Musculoskeletal:         General: Normal range of motion.      Cervical back: Normal range of motion and neck supple.      Right lower leg: Edema present.      Left lower leg: Edema present.      Comments: Right lower extremity 1 to 2+ edema  Left lower extremity 2 to 3+ edema   Skin:     General: Skin is warm and dry.      Capillary Refill: Capillary refill takes less than 2 seconds.      Comments: Bruising to left lower extremity from knee joint to groin region   Neurological:      " General: No focal deficit present.      Mental Status: He is alert and oriented to person, place, and time. Mental status is at baseline.         Objective:    I have reviewed all medications, laboratory results, and imaging pertinent for today's encounter.         No intake or output data in the 24 hours ending 08/08/24 2902      Assessment/Plan:    I am currently managing this critically ill patient for the following problems:    Neuro/Psych/Pain Ctrl/Sedation:  No active issues, mentation at baseline  -- Tylenol for mild pain    Respiratory/ENT:  No acute issues, on room air. Monitor spO2    Cardiovascular:  #Atrial Flutter  #History of Paroxysmal Atrial Fibrillation s/p multiple cardioversion and ablations  #History of HTN, HLD  Believes he has been in atrial flutter since Sunday 8/4, last dose of Xarelto morning of 8/8  -- Troponin unremarkable, BNP unremarkable, no signs of acute CHF exacerbation  -- Will obtain echocardiogram  -- Hold home Xarelto until acute lower extremity bleeding is ruled out, consider heparin gtt or Xarelto resumption 8/9  -- Hold home lisinopril and spironolactone for now  -- Continue metoprolol and home statin  -- Initiate amiodarone infusion  -- Consult to [facility] Cardiology Dr. Ron, may need DCCV    GI:  #GERD  -- Continue home PPI  -- NPO with meds pending workup    Renal/Volume Status (Intra & Extravascular):  No acute issues  -- Strict intake and output  -- Daily BMP and electrolyte repletion    Endocrine  #Non-insulin-dependent DM2  -- Hold home metformin and glipizide for now  -- Moderate SSI with meals    Infectious Disease:  No overt signs of infection  -- Follow-up CT LLE to rule out joint effusion  -- Follow-up ESR and CRP  -- Will defer antibiotics at this time    Heme/Onc:  #Normocytic Anemia  Hgb from 2019 of 12.8, down to 10.7 this admission, no overt signs of active bleeding  -- Follow-up CT LLE to rule out vascular injury or hematoma  -- Hold therapeutic  anticoagulation  -- Daily CBC, transfuse for Hgb goal >7    ORTHO/MSK:  #LLE Injury  -- CT imaging pending  -- May need PT/OT, will assess daily    Ethics/Code Status:  Full code    :  DVT Prophylaxis: Holding  GI Prophylaxis: Home PPI  Bowel Regimen: As needed  Diet: NPO  CVC: no  Veronica: no  Louis: no  Restraints: no  Dispo: ICU    Critical Care Time:  45 minutes spent in preparing to see patient (I.e. review of medical records), evaluation of diagnostics (I.e. labs, imaging, etc.), documentation, discussing plan of care with patient/ family/ caregiver, and/ or coordination of care with multidisciplinary team. Time does not include completion of procedure time.       Wang Bernardo APRN-CNP  Pulmonary & Critical Care Medicine  [facility]

## 2024-08-08 NOTE — ED PROVIDER NOTES
HPI   Chief Complaint   Patient presents with    Leg Injury     Left leg ecchymosis from getting out of bed.  LA=084.          History provided by:  Patient and significant other  History of Present Illness:  69-year-old male presents with left leg swelling and bruising after getting out of bed.  Worse with movement and palpation.  No treatment prior to arrival.  Patient has a known history of atrial fibrillation and is on Xarelto.  No chest pain or shortness of breath.  No palpitations.  No fever, chills or cough.  No back or flank pain.  No abdominal pain.  Nothing seems to make his symptoms worse or better.  No trauma or travel.  No sick contacts.      PMFSH:   As per HPI, otherwise nurses notes reviewed in EMR.    Past Medical History:   Active Ambulatory Problems     Diagnosis Date Noted    Abnormal urine cytology 02/07/2023    Abnormal weight gain 02/07/2023    Acute left-sided low back pain without sciatica 02/07/2023    Controlled type 2 diabetes mellitus with stage 3 chronic kidney disease (Multi) 02/07/2023    Hypercholesterolemia 02/07/2023    Hypertension 02/07/2023    Incontinence 02/07/2023    Normochromic normocytic anemia 02/07/2023    Paroxysmal atrial fibrillation with RVR (Multi) 02/07/2023    Type 2 diabetes mellitus without complication, without long-term current use of insulin (Multi) 02/07/2023    Tingling of both feet 03/23/2023    Hematochezia 09/17/2023    Functional diarrhea 09/17/2023    Increased urinary frequency 09/17/2023    Cardiomyopathy, nonischemic (Multi) 10/12/2023    Obesity, morbid (Multi) 12/22/2023    Retention of urine 03/01/2024     Resolved Ambulatory Problems     Diagnosis Date Noted    Acute bronchitis due to other specified organisms 02/07/2023    Acute cystitis without hematuria 09/17/2023     Past Medical History:   Diagnosis Date    Cardiac arrhythmia, unspecified     Personal history of other diseases of the respiratory system 04/24/2019    Personal history of  other specified conditions 2022    Personal history of other specified conditions 2021    Personal history of other specified conditions 2021    Personal history of urinary (tract) infections 2021      Past Surgical History:   Past Surgical History:   Procedure Laterality Date    CYSTOSCOPY  2015    Diagnostic Cystoscopy    KNEE SURGERY  2015    Knee Surgery    OTHER SURGICAL HISTORY  2015    Complex Bladder Flow Rate Studies      Family History:   Family History   Problem Relation Name Age of Onset    Prostate cancer Father      Lung cancer Brother      Esophageal cancer Brother        Social History:    Social History     Socioeconomic History    Marital status:      Spouse name: Not on file    Number of children: Not on file    Years of education: Not on file    Highest education level: Not on file   Occupational History    Not on file   Tobacco Use    Smoking status: Former     Current packs/day: 0.00     Average packs/day: 1 pack/day for 25.0 years (25.0 ttl pk-yrs)     Types: Cigarettes     Start date:      Quit date:      Years since quittin.6     Passive exposure: Past    Smokeless tobacco: Never   Substance and Sexual Activity    Alcohol use: Not Currently    Drug use: Never    Sexual activity: Not on file   Other Topics Concern    Not on file   Social History Narrative    Not on file     Social Determinants of Health     Financial Resource Strain: Not on file   Food Insecurity: Not on file   Transportation Needs: Not on file   Physical Activity: Not on file   Stress: Not on file   Social Connections: Not on file   Intimate Partner Violence: Not on file   Housing Stability: Not on file              Patient History   Past Medical History:   Diagnosis Date    Cardiac arrhythmia, unspecified     Irregular heartbeat    Personal history of other diseases of the respiratory system 2019    History of acute bronchitis    Personal history of other  specified conditions 2022    History of gross hematuria    Personal history of other specified conditions 2021    History of urinary retention    Personal history of other specified conditions 2021    History of urinary urgency    Personal history of urinary (tract) infections 2021    History of urinary tract infection     Past Surgical History:   Procedure Laterality Date    CYSTOSCOPY  2015    Diagnostic Cystoscopy    KNEE SURGERY  2015    Knee Surgery    OTHER SURGICAL HISTORY  2015    Complex Bladder Flow Rate Studies     Family History   Problem Relation Name Age of Onset    Prostate cancer Father      Lung cancer Brother      Esophageal cancer Brother       Social History     Tobacco Use    Smoking status: Former     Current packs/day: 0.00     Average packs/day: 1 pack/day for 25.0 years (25.0 ttl pk-yrs)     Types: Cigarettes     Start date:      Quit date:      Years since quittin.6     Passive exposure: Past    Smokeless tobacco: Never   Substance Use Topics    Alcohol use: Not Currently    Drug use: Never       Physical Exam   ED Triage Vitals   Temperature Heart Rate Respirations BP   24 1358 24 1346 24 1345 24 1346   36.8 °C (98.2 °F) (!) 150 20 141/85      Pulse Ox Temp src Heart Rate Source Patient Position   24 1345 -- 24 1345 24 1345   95 %  Monitor Sitting      BP Location FiO2 (%)     24 1345 --     Right arm        Physical Exam  Physical Exam:    ED Triage Vitals   Temperature Heart Rate Respirations BP   24 1358 24 1346 24 1345 24 1346   36.8 °C (98.2 °F) (!) 150 20 141/85      Pulse Ox Temp src Heart Rate Source Patient Position   24 1345 -- 24 1345 24 1345   95 %  Monitor Sitting      BP Location FiO2 (%)     24 1345 --     Right arm          Constitutional: Vital signs per nursing notes.  Well developed, well nourished.  Mild acute distress.     Psychiatric: alert and oriented to person, place, and time; no abnormalities of mood or affect; memory intact  Eyes: PERRL; conjunctivae and lids normal; EOMI  ENT: otoscopic exam of external canal and TM´s normal; nasal mucosa, turbinates, and septum normal; mouth, tongue, and pharynx normal; pharynx without edema, exudate, or injection  Neck: neck supple, no meningismus; trachea midline without deviation; no lymphadenopathy; no thyromegaly; carotid pulses even bilaterally  Chest: no masses or tenderness, no discharge  Respiratory: normal respiratory effort and excursion; no rales, rhonchi, or wheezes; equal air entry  Cardiovascular: Tachycardic, irregular rate and rhythm; no murmurs, rubs or gallops; symmetric pulses; 2+ edema to the right lower extremity and 3+ pitting edema to the right lower extremity; normal capillary refill; distal pulses present  Neurological: normal speech; CN II-XII grossly intact; normal motor and sensory function; no nystagmus; no pronator drift; normal finger to nose and heel to shin tests  GI: no masses, tenderness, rebound or guarding; no palpable, pulsatile mass; no organomegaly; no hernia; normal bowel sounds; (-) Lozoya´s sign; (-) McBurney´s sign; (-) CVA tenderness  Lymphatic: no adenopathy of neck  Musculoskeletal:  normal digits and nails; no gross tendon or ligament injury; normal to palpation; normal strength/tone; neurovascular status intact; (-) Mishel´s sign; (-) straight leg raise; except mild tenderness to palpation to the left knee  Skin: normal to inspection; normal to palpation; no rash; except purplish ecchymosis to the left medial knee and inner thigh  GCS: 15      ED Course & MDM   Diagnoses as of 08/08/24 1632   Atrial flutter, unspecified type (Multi)   Left leg swelling   Medication induced coagulopathy (Multi)   Anemia, unspecified type                 No data recorded     Gio Coma Scale Score: 15 (08/08/24 1344 : Sarmad Leblanc RN)                            Medical Decision Making  Medical Decision Making:    Differential Diagnoses Considered: ACS, arrhythmia, electrolyte abnormality, CHF, DVT, fracture, dislocation, internal derangement     EKG: My interpretation of EKG is atrial flutter at 150 bpm with left axis deviation and nonspecific ST-T changes             My interpretation of second EKG is atrial flutter at 145 bpm with nonspecific ST-T changes    Labs Reviewed   CBC WITH AUTO DIFFERENTIAL - Abnormal       Result Value    WBC 10.1      nRBC 0.0      RBC 3.98 (*)     Hemoglobin 10.7 (*)     Hematocrit 33.4 (*)     MCV 84      MCH 26.9      MCHC 32.0      RDW 15.9 (*)     Platelets 236      Neutrophils % 75.7      Immature Granulocytes %, Automated 0.6      Lymphocytes % 15.3      Monocytes % 7.3      Eosinophils % 0.8      Basophils % 0.3      Neutrophils Absolute 7.64 (*)     Immature Granulocytes Absolute, Automated 0.06      Lymphocytes Absolute 1.54      Monocytes Absolute 0.74      Eosinophils Absolute 0.08      Basophils Absolute 0.03     COMPREHENSIVE METABOLIC PANEL - Abnormal    Glucose 115 (*)     Sodium 135 (*)     Potassium 4.7      Chloride 105      Bicarbonate 22      Anion Gap 13      Urea Nitrogen 28 (*)     Creatinine 1.25      eGFR 62      Calcium 9.3      Albumin 3.8      Alkaline Phosphatase 74      Total Protein 7.2      AST 11      Bilirubin, Total 0.6      ALT 9 (*)    MAGNESIUM - Abnormal    Magnesium 1.59 (*)    APTT - Abnormal    aPTT 47 (*)     Narrative:     The APTT is no longer used for monitoring Unfractionated Heparin Therapy. For monitoring Heparin Therapy, use the Heparin Assay.   PROTIME-INR - Abnormal    Protime 25.5 (*)     INR 2.2 (*)    B-TYPE NATRIURETIC PEPTIDE - Normal    BNP 63      Narrative:        <100 pg/mL - Heart failure unlikely  100-299 pg/mL - Intermediate probability of acute heart                  failure exacerbation. Correlate with clinical                  context and patient history.    >=300  pg/mL - Heart Failure likely. Correlate with clinical                  context and patient history.    BNP testing is performed using different testing methodology at [facility] than at other [facility]. Direct result comparisons should only be made within the same method.      LACTATE - Normal    Lactate 1.4      Narrative:     Venipuncture immediately after or during the administration of Metamizole may lead to falsely low results. Testing should be performed immediately  prior to Metamizole dosing.   SERIAL TROPONIN-INITIAL - Normal    Troponin I, High Sensitivity 5      Narrative:     Less than 99th percentile of normal range cutoff-  Female and children under 18 years old <14 ng/L; Male <21 ng/L: Negative  Repeat testing should be performed if clinically indicated.     Female and children under 18 years old 14-50 ng/L; Male 21-50 ng/L:  Consistent with possible cardiac damage and possible increased clinical   risk. Serial measurements may help to assess extent of myocardial damage.     >50 ng/L: Consistent with cardiac damage, increased clinical risk and  myocardial infarction. Serial measurements may help assess extent of   myocardial damage.      NOTE: Children less than 1 year old may have higher baseline troponin   levels and results should be interpreted in conjunction with the overall   clinical context.     NOTE: Troponin I testing is performed using a different   testing methodology at [facility] than at other   [facility]. Direct result comparisons should only   be made within the same method.   TROPONIN SERIES- (INITIAL, 1 HR)    Narrative:     The following orders were created for panel order Troponin I Series, High Sensitivity (0, 1 HR).  Procedure                               Abnormality         Status                     ---------                               -----------         ------                     Troponin I, High Sensiti...[[id number]]  Normal               Final result               Troponin, High Sensitivi...[[id number]]                                                   Please view results for these tests on the individual orders.   SERIAL TROPONIN, 1 HOUR       Lower extremity venous duplex left   Final Result   No evidence for DVT within the left lower extremity.   Signed by Julio Arroyo DO      XR chest 1 view   Final Result   No acute disease.   Signed by Osman Mcgovern MD      XR femur left 2+ views   Final Result   No acute injury demonstrated.   Signed by Osman Mcgovern MD      XR knee left 1-2 views   Final Result   No acute findings.   Signed by Osman Mcgovern MD      XR tibia fibula left 2 views   Final Result   No acute findings.   Signed by Osman Mcgovern MD          Diagnostic testing considered:         Review of recent and relevant records:    ED Medication Administration:   ED Medication Administration from 08/08/2024 1341 to 08/08/2024 1632         Date/Time Order Dose Route Action Action by     08/08/2024 1429 EDT dilTIAZem (Cardizem) injection 20 mg 20 mg intravenous Given HAWA Vargas     08/08/2024 1443 EDT dilTIAZem (Cardizem) 125 mg in dextrose 5% 125 mL (1 mg/mL) infusion (premix) 5 mg/hr intravenous New Bag HAWA Vargas     08/08/2024 1545 EDT dilTIAZem (Cardizem) injection 20 mg 20 mg intravenous Given HAWA Vargas            Prescription Medication Consideration/Given:     Social Determinants of Health Significantly Affecting Care:      Summary:    /81 (08/08/24 1619)    Temp      Pulse (!) 153 (08/08/24 1619)   Resp 16 (08/08/24 1619)    SpO2 97 % (08/08/24 1619)      Abnormal Labs Reviewed   CBC WITH AUTO DIFFERENTIAL - Abnormal; Notable for the following components:       Result Value    RBC 3.98 (*)     Hemoglobin 10.7 (*)     Hematocrit 33.4 (*)     RDW 15.9 (*)     Neutrophils Absolute 7.64 (*)     All other components within normal limits   COMPREHENSIVE METABOLIC PANEL - Abnormal; Notable for the following components:    Glucose 115 (*)      Sodium 135 (*)     Urea Nitrogen 28 (*)     ALT 9 (*)     All other components within normal limits   MAGNESIUM - Abnormal; Notable for the following components:    Magnesium 1.59 (*)     All other components within normal limits   APTT - Abnormal; Notable for the following components:    aPTT 47 (*)     All other components within normal limits    Narrative:     The APTT is no longer used for monitoring Unfractionated Heparin Therapy. For monitoring Heparin Therapy, use the Heparin Assay.   PROTIME-INR - Abnormal; Notable for the following components:    Protime 25.5 (*)     INR 2.2 (*)     All other components within normal limits     Diagnostic evaluation was completed.  Initial troponin is in the normal range.  Therefore I do not suspect acute coronary syndrome.  BNP is in the normal range so I do not suspect CHF.  Metabolic panel shows a glucose of slightly elevated at 115.  Sodium is slightly low at 135.  Potassium is in the normal range.  BUN is slightly elevated at 28 with a creatinine of 1.25.  AST and ALT are within normal range.  Lactate is in the normal range so I do not suspect sepsis.  INR is 2.2.  CBC shows a normal white blood cell count with mild anemia with a hemoglobin of 10.7.  Ultrasound of the left lower extremity shows no evidence for DVT.  Chest x-ray shows no acute disease.  X-ray of the left femur shows no acute injury.  X-ray of the left knee shows no acute findings.  An x-ray of the left tibia and fibula shows no acute findings.    The exact cause of the patient's swelling and bruising to his left lower extremity are unclear at this time.  It is possible the patient may have a hematoma due to his anticoagulation status.  This will need to be closely monitored.    The patient was also found to have acute atrial flutter.  This required 2 boluses of IV Cardizem followed by Cardizem drip.  Since the patient is already on anticoagulation, heparin drip is not indicated at this time.  Since  the patient is still in atrial flutter, dose of digoxin will be administered.    The patient will require hospitalization for further treatment and evaluation.    I discussed the results and plan for hospitalization with the patient and/or family/friend if present.  Questions were addressed.  Patient and/or family/friend expressed understanding.    The patient's condition requires ongoing treatment and evaluation necessitating hospital admission.  I have reviewed the patient's history, physical exam, and test information with the admitting physician,     PETE Morgan/Dr. Curtis              , who agrees to hospitalize the patient.         Diagnoses as of 08/08/24 1632   Atrial flutter, unspecified type (Multi)   Left leg swelling   Medication induced coagulopathy (Multi)   Anemia, unspecified type         Procedure  Procedures     Marcelino OLIVAS MD  08/08/24 1632

## 2024-08-09 ENCOUNTER — APPOINTMENT (OUTPATIENT)
Dept: CARDIOLOGY | Facility: HOSPITAL | Age: 69
DRG: 309 | End: 2024-08-09
Payer: MEDICARE

## 2024-08-09 ENCOUNTER — APPOINTMENT (OUTPATIENT)
Dept: CARDIOLOGY | Facility: HOSPITAL | Age: 69
End: 2024-08-09
Payer: MEDICARE

## 2024-08-09 LAB
ALBUMIN SERPL BCP-MCNC: 3.4 G/DL (ref 3.4–5)
ALP SERPL-CCNC: 73 U/L (ref 33–136)
ALT SERPL W P-5'-P-CCNC: 9 U/L (ref 10–52)
ANION GAP SERPL CALC-SCNC: 10 MMOL/L (ref 10–20)
AORTIC VALVE MEAN GRADIENT: 3 MMHG
AORTIC VALVE PEAK VELOCITY: 1.27 M/S
AST SERPL W P-5'-P-CCNC: 10 U/L (ref 9–39)
ATRIAL RATE: 290 BPM
ATRIAL RATE: 300 BPM
AV PEAK GRADIENT: 6.5 MMHG
AVA (PEAK VEL): 2.5 CM2
AVA (VTI): 2.87 CM2
BILIRUB SERPL-MCNC: 0.6 MG/DL (ref 0–1.2)
BUN SERPL-MCNC: 20 MG/DL (ref 6–23)
CALCIUM SERPL-MCNC: 9 MG/DL (ref 8.6–10.3)
CHLORIDE SERPL-SCNC: 103 MMOL/L (ref 98–107)
CO2 SERPL-SCNC: 25 MMOL/L (ref 21–32)
CREAT SERPL-MCNC: 1.02 MG/DL (ref 0.5–1.3)
EGFRCR SERPLBLD CKD-EPI 2021: 79 ML/MIN/1.73M*2
EJECTION FRACTION APICAL 4 CHAMBER: 53
EJECTION FRACTION: 53 %
ERYTHROCYTE [DISTWIDTH] IN BLOOD BY AUTOMATED COUNT: 16 % (ref 11.5–14.5)
GLUCOSE BLD MANUAL STRIP-MCNC: 115 MG/DL (ref 74–99)
GLUCOSE BLD MANUAL STRIP-MCNC: 127 MG/DL (ref 74–99)
GLUCOSE BLD MANUAL STRIP-MCNC: 146 MG/DL (ref 74–99)
GLUCOSE BLD MANUAL STRIP-MCNC: 146 MG/DL (ref 74–99)
GLUCOSE SERPL-MCNC: 119 MG/DL (ref 74–99)
HCT VFR BLD AUTO: 31.7 % (ref 41–52)
HGB BLD-MCNC: 10.2 G/DL (ref 13.5–17.5)
HOLD SPECIMEN: NORMAL
LEFT ATRIUM VOLUME AREA LENGTH INDEX BSA: 38.2 ML/M2
LEFT VENTRICLE INTERNAL DIMENSION DIASTOLE: 5.5 CM (ref 3.5–6)
LEFT VENTRICULAR OUTFLOW TRACT DIAMETER: 2 CM
LV EJECTION FRACTION BIPLANE: 53 %
MAGNESIUM SERPL-MCNC: 1.96 MG/DL (ref 1.6–2.4)
MCH RBC QN AUTO: 26.7 PG (ref 26–34)
MCHC RBC AUTO-ENTMCNC: 32.2 G/DL (ref 32–36)
MCV RBC AUTO: 83 FL (ref 80–100)
NRBC BLD-RTO: 0 /100 WBCS (ref 0–0)
PHOSPHATE SERPL-MCNC: 3.5 MG/DL (ref 2.5–4.9)
PLATELET # BLD AUTO: 217 X10*3/UL (ref 150–450)
POTASSIUM SERPL-SCNC: 4.2 MMOL/L (ref 3.5–5.3)
PROT SERPL-MCNC: 6.6 G/DL (ref 6.4–8.2)
Q ONSET: 214 MS
Q ONSET: 215 MS
QRS COUNT: 24 BEATS
QRS COUNT: 24 BEATS
QRS DURATION: 72 MS
QRS DURATION: 76 MS
QT INTERVAL: 280 MS
QT INTERVAL: 284 MS
QTC CALCULATION(BAZETT): 441 MS
QTC CALCULATION(BAZETT): 442 MS
QTC FREDERICIA: 379 MS
QTC FREDERICIA: 381 MS
R AXIS: -24 DEGREES
R AXIS: -33 DEGREES
RBC # BLD AUTO: 3.82 X10*6/UL (ref 4.5–5.9)
RIGHT VENTRICLE FREE WALL PEAK S': 12 CM/S
RIGHT VENTRICLE PEAK SYSTOLIC PRESSURE: 37.1 MMHG
SODIUM SERPL-SCNC: 134 MMOL/L (ref 136–145)
T AXIS: -59 DEGREES
T AXIS: -74 DEGREES
T OFFSET: 354 MS
T OFFSET: 357 MS
TRICUSPID ANNULAR PLANE SYSTOLIC EXCURSION: 1.6 CM
UFH PPP CHRO-ACNC: 0.8 IU/ML
VENTRICULAR RATE: 145 BPM
VENTRICULAR RATE: 150 BPM
WBC # BLD AUTO: 8.2 X10*3/UL (ref 4.4–11.3)

## 2024-08-09 PROCEDURE — 85027 COMPLETE CBC AUTOMATED: CPT

## 2024-08-09 PROCEDURE — 85520 HEPARIN ASSAY: CPT

## 2024-08-09 PROCEDURE — 82947 ASSAY GLUCOSE BLOOD QUANT: CPT

## 2024-08-09 PROCEDURE — 80053 COMPREHEN METABOLIC PANEL: CPT

## 2024-08-09 PROCEDURE — 93005 ELECTROCARDIOGRAM TRACING: CPT

## 2024-08-09 PROCEDURE — 36415 COLL VENOUS BLD VENIPUNCTURE: CPT

## 2024-08-09 PROCEDURE — 2500000004 HC RX 250 GENERAL PHARMACY W/ HCPCS (ALT 636 FOR OP/ED)

## 2024-08-09 PROCEDURE — 93306 TTE W/DOPPLER COMPLETE: CPT | Performed by: INTERNAL MEDICINE

## 2024-08-09 PROCEDURE — 99291 CRITICAL CARE FIRST HOUR: CPT

## 2024-08-09 PROCEDURE — 83735 ASSAY OF MAGNESIUM: CPT

## 2024-08-09 PROCEDURE — 2500000001 HC RX 250 WO HCPCS SELF ADMINISTERED DRUGS (ALT 637 FOR MEDICARE OP)

## 2024-08-09 PROCEDURE — 93010 ELECTROCARDIOGRAM REPORT: CPT | Performed by: INTERNAL MEDICINE

## 2024-08-09 PROCEDURE — 2020000001 HC ICU ROOM DAILY

## 2024-08-09 PROCEDURE — 93306 TTE W/DOPPLER COMPLETE: CPT

## 2024-08-09 PROCEDURE — 2500000002 HC RX 250 W HCPCS SELF ADMINISTERED DRUGS (ALT 637 FOR MEDICARE OP, ALT 636 FOR OP/ED)

## 2024-08-09 PROCEDURE — 84100 ASSAY OF PHOSPHORUS: CPT

## 2024-08-09 RX ORDER — MAGNESIUM SULFATE HEPTAHYDRATE 40 MG/ML
2 INJECTION, SOLUTION INTRAVENOUS ONCE
Status: COMPLETED | OUTPATIENT
Start: 2024-08-09 | End: 2024-08-09

## 2024-08-09 RX ORDER — HEPARIN SODIUM 10000 [USP'U]/100ML
0-4000 INJECTION, SOLUTION INTRAVENOUS CONTINUOUS
Status: DISCONTINUED | OUTPATIENT
Start: 2024-08-09 | End: 2024-08-09

## 2024-08-09 RX ADMIN — AMIODARONE HYDROCHLORIDE 1 MG/MIN: 1.8 INJECTION, SOLUTION INTRAVENOUS at 15:43

## 2024-08-09 RX ADMIN — DILTIAZEM HYDROCHLORIDE 300 MG: 300 CAPSULE, COATED, EXTENDED RELEASE ORAL at 12:28

## 2024-08-09 RX ADMIN — SPIRONOLACTONE 25 MG: 25 TABLET ORAL at 12:28

## 2024-08-09 RX ADMIN — OXYBUTYNIN CHLORIDE 5 MG: 5 TABLET ORAL at 21:01

## 2024-08-09 RX ADMIN — LISINOPRIL 5 MG: 5 TABLET ORAL at 12:28

## 2024-08-09 RX ADMIN — PERFLUTREN 1.3 ML OF DILUTION: 6.52 INJECTION, SUSPENSION INTRAVENOUS at 09:08

## 2024-08-09 RX ADMIN — AMIODARONE HYDROCHLORIDE 0.5 MG/MIN: 1.8 INJECTION, SOLUTION INTRAVENOUS at 05:46

## 2024-08-09 RX ADMIN — SIMVASTATIN 20 MG: 20 TABLET, FILM COATED ORAL at 21:01

## 2024-08-09 RX ADMIN — MAGNESIUM SULFATE HEPTAHYDRATE 2 G: 40 INJECTION, SOLUTION INTRAVENOUS at 06:57

## 2024-08-09 RX ADMIN — METOPROLOL TARTRATE 50 MG: 50 TABLET, FILM COATED ORAL at 08:39

## 2024-08-09 RX ADMIN — HEPARIN SODIUM 1000 UNITS/HR: 10000 INJECTION, SOLUTION INTRAVENOUS at 08:31

## 2024-08-09 RX ADMIN — TAMSULOSIN HYDROCHLORIDE 0.4 MG: 0.4 CAPSULE ORAL at 08:39

## 2024-08-09 RX ADMIN — AMIODARONE HYDROCHLORIDE 1 MG/MIN: 1.8 INJECTION, SOLUTION INTRAVENOUS at 21:01

## 2024-08-09 RX ADMIN — METOPROLOL TARTRATE 50 MG: 50 TABLET, FILM COATED ORAL at 21:01

## 2024-08-09 RX ADMIN — TERAZOSIN HYDROCHLORIDE 2 MG: 1 CAPSULE ORAL at 21:01

## 2024-08-09 ASSESSMENT — ENCOUNTER SYMPTOMS
NEUROLOGICAL NEGATIVE: 1
DIAPHORESIS: 0
ALLERGIC/IMMUNOLOGIC NEGATIVE: 1
EYES NEGATIVE: 1
CARDIOVASCULAR NEGATIVE: 1
GASTROINTESTINAL NEGATIVE: 1
FATIGUE: 0
RESPIRATORY NEGATIVE: 1
PSYCHIATRIC NEGATIVE: 1
CHILLS: 0
HEMATOLOGIC/LYMPHATIC NEGATIVE: 1
CONSTITUTIONAL NEGATIVE: 1
JOINT SWELLING: 1
ENDOCRINE NEGATIVE: 1

## 2024-08-09 ASSESSMENT — PAIN - FUNCTIONAL ASSESSMENT
PAIN_FUNCTIONAL_ASSESSMENT: 0-10

## 2024-08-09 ASSESSMENT — PAIN SCALES - GENERAL
PAINLEVEL_OUTOF10: 0 - NO PAIN

## 2024-08-09 NOTE — PROGRESS NOTES
08/09/24 1005   Discharge Planning   Living Arrangements Spouse/significant other   Support Systems Spouse/significant other   Assistance Needed PTA - none   Type of Residence Private residence   Number of Stairs to Enter Residence 0   Number of Stairs Within Residence 0   Do you have animals or pets at home? Yes   Home or Post Acute Services None   Expected Discharge Disposition Home   Does the patient need discharge transport arranged? No     Admitted for Aflutter. Planned for possible Cardioversion today, follows with [facility]. Independent PTA. Home no needs.

## 2024-08-09 NOTE — CONSULTS
Chief Complaint   Patient presents with    Leg Injury     Left leg ecchymosis from getting out of bed.  TB=[id number].         Patient is a 69 y.o. male who presents with a chief complaint of left leg pain. Patient is followed on a regular basis by Dr. Livan Bartholomew DO.  Patient with past medical history of atrial fibrillation status post ablation x 2, diabetes mellitus, hypertension, hypercholesteremia, morbid obesity class III, venous insufficiency.  Patient noted to have left lower extremity hematoma.  He was also noted to be in atrial fibrillation with rapid ventricle response initiated on amiodarone drip.  He briefly converted to normal sinus rhythm however reverted back to atrial fibrillation with rapid ventricle response.  He denies any chest pain chest pressure heaviness.  No CHF type symptoms.  Patient has been compliant with his medications.  Hemoglobin is 10.3.    Status post echocardiogram today with ejection fraction of 50 to 55%, mild mitral and tricuspid regurgitation.  Past Medical History:   Diagnosis Date    Cardiac arrhythmia, unspecified     Irregular heartbeat    Personal history of other diseases of the respiratory system 04/24/2019    History of acute bronchitis    Personal history of other specified conditions 02/08/2022    History of gross hematuria    Personal history of other specified conditions 02/03/2021    History of urinary retention    Personal history of other specified conditions 02/02/2021    History of urinary urgency    Personal history of urinary (tract) infections 02/03/2021    History of urinary tract infection      Patient Active Problem List   Diagnosis    Abnormal urine cytology    Abnormal weight gain    Acute left-sided low back pain without sciatica    Controlled type 2 diabetes mellitus with stage 3 chronic kidney disease (Multi)    Hypercholesterolemia    Hypertension    Incontinence    Normochromic normocytic anemia    Paroxysmal atrial fibrillation with RVR (Multi)     Type 2 diabetes mellitus without complication, without long-term current use of insulin (Multi)    Tingling of both feet    Hematochezia    Functional diarrhea    Increased urinary frequency    Cardiomyopathy, nonischemic (Multi)    Obesity, morbid (Multi)    Retention of urine    Atrial flutter, unspecified type (Multi)       Past Surgical History:   Procedure Laterality Date    CYSTOSCOPY  2015    Diagnostic Cystoscopy    KNEE SURGERY  2015    Knee Surgery    OTHER SURGICAL HISTORY  2015    Complex Bladder Flow Rate Studies       Social History     Socioeconomic History    Marital status:    Tobacco Use    Smoking status: Former     Current packs/day: 0.00     Average packs/day: 1 pack/day for 25.0 years (25.0 ttl pk-yrs)     Types: Cigarettes     Start date:      Quit date:      Years since quittin.6     Passive exposure: Past    Smokeless tobacco: Never   Substance and Sexual Activity    Alcohol use: Not Currently    Drug use: Never     Social Determinants of Health     Financial Resource Strain: Low Risk  (2024)    Overall Financial Resource Strain (CARDIA)     Difficulty of Paying Living Expenses: Not very hard   Transportation Needs: No Transportation Needs (2024)    PRAPARE - Transportation     Lack of Transportation (Medical): No     Lack of Transportation (Non-Medical): No   Housing Stability: Low Risk  (2024)    Housing Stability Vital Sign     Unable to Pay for Housing in the Last Year: No     Number of Times Moved in the Last Year: 0     Homeless in the Last Year: No       Family History   Problem Relation Name Age of Onset    Prostate cancer Father      Lung cancer Brother      Esophageal cancer Brother         Current Facility-Administered Medications   Medication Dose Route Frequency Provider Last Rate Last Admin    acetaminophen (Tylenol) tablet 650 mg  650 mg oral q6h CAYDENN LIONEL Espinosa-CNP        amiodarone (Nexterone) 360 mg in  dextrose,iso-osm 200 mL (1.8 mg/mL) infusion (premix)  0.5-1 mg/min intravenous Continuous [employer] [place], APRN-CNP 33.3 mL/hr at 08/09/24 1543 1 mg/min at 08/09/24 1543    dextrose 50 % injection 12.5 g  12.5 g intravenous q15 min PRN [facility], APRN-CNP        dextrose 50 % injection 25 g  25 g intravenous q15 min PRN [facility], APRN-CNP        dilTIAZem CD (Cardizem CD) 24 hr capsule 300 mg  300 mg oral Daily [facility], APRN-CNP   300 mg at 08/09/24 1228    [Held by provider] glipiZIDE XL (Glucotrol XL) 24 hr tablet 2.5 mg  2.5 mg oral Daily with breakfast [facility], APRN-CNP        insulin lispro (HumaLOG) injection 0-10 Units  0-10 Units subcutaneous Before meals & nightly [facility]        lisinopril tablet 5 mg  5 mg oral Daily [facility]   5 mg at 08/09/24 1228    [Held by provider] metFORMIN (Glucophage) tablet 1,000 mg  1,000 mg oral BID [facility], APRN-MARGIE        metoprolol tartrate (Lopressor) tablet 50 mg  50 mg oral BID AVINASH Sharif   50 mg at 08/09/24 0839    oxybutynin (Ditropan) tablet 5 mg  5 mg oral Nightly [facility]   5 mg at 08/08/24 2041    pantoprazole (ProtoNix) EC tablet 40 mg  40 mg oral Daily before breakfast [facility]        [Held by provider] rivaroxaban (Xarelto) tablet 20 mg  20 mg oral Daily with evening meal [facility] chilel-MARGIE        simvastatin (Zocor) tablet 20 mg  20 mg oral Daily KELSIE SharifCNP   20 mg at 08/08/24 2041    spironolactone (Aldactone) tablet 25 mg  25 mg oral Daily [facility]   25 mg at 08/09/24 1228    tamsulosin (Flomax) 24 hr capsule 0.4 mg  0.4 mg oral Daily LIONEL Sharif-CNP   0.4 mg at 08/09/24 0839    terazosin (Hytrin) capsule 2 mg  2 mg oral Nightly LIONEL Sharif-CNP   2 mg at 08/08/24 2041       ALLERGIES: Benzonatate    Review of Systems   Constitutional: Negative.  Negative  "for chills, diaphoresis and fatigue.   HENT: Negative.     Eyes: Negative.    Respiratory: Negative.     Cardiovascular: Negative.    Gastrointestinal: Negative.    Endocrine: Negative.    Genitourinary: Negative.    Musculoskeletal:  Positive for joint swelling.   Skin: Negative.    Allergic/Immunologic: Negative.    Neurological: Negative.    Hematological: Negative.    Psychiatric/Behavioral: Negative.           VITALS:  Blood pressure (!) 138/116, pulse 90, temperature 35.9 °C (96.6 °F), temperature source Temporal, resp. rate (!) 34, height 1.727 m (5' 7.99\"), weight 125 kg (276 lb 7.3 oz), SpO2 92%.  Body mass index is 42.05 kg/m².    Physical Exam  Constitutional:       Appearance: Normal appearance.   HENT:      Head: Normocephalic and atraumatic.      Mouth/Throat:      Mouth: Mucous membranes are moist.      Pharynx: Oropharynx is clear.   Eyes:      Extraocular Movements: Extraocular movements intact.      Conjunctiva/sclera: Conjunctivae normal.      Pupils: Pupils are equal, round, and reactive to light.   Neck:      Vascular: No carotid bruit.   Cardiovascular:      Rate and Rhythm: Normal rate and regular rhythm.   Pulmonary:      Effort: Pulmonary effort is normal.      Breath sounds: Normal breath sounds. No wheezing or rales.   Abdominal:      General: Bowel sounds are normal. There is no distension.      Tenderness: There is no abdominal tenderness. There is no guarding.   Musculoskeletal:      Cervical back: Normal range of motion and neck supple.      Comments: Left leg edema, hematoma, ecchymosis   Skin:     General: Skin is warm.   Neurological:      General: No focal deficit present.      Mental Status: He is oriented to person, place, and time. Mental status is at baseline.   Psychiatric:         Mood and Affect: Mood normal.         Behavior: Behavior normal.          LABS:  Recent Results (from the past 24 hour(s))   Troponin, High Sensitivity, 1 Hour    Collection Time: 08/08/24  6:27 PM "   Result Value Ref Range    Troponin I, High Sensitivity 4 0 - 20 ng/L   Comprehensive metabolic panel    Collection Time: 08/08/24  6:27 PM   Result Value Ref Range    Glucose 78 74 - 99 mg/dL    Sodium 134 (L) 136 - 145 mmol/L    Potassium 4.4 3.5 - 5.3 mmol/L    Chloride 103 98 - 107 mmol/L    Bicarbonate 22 21 - 32 mmol/L    Anion Gap 13 10 - 20 mmol/L    Urea Nitrogen 24 (H) 6 - 23 mg/dL    Creatinine 1.18 0.50 - 1.30 mg/dL    eGFR 66 >60 mL/min/1.73m*2    Calcium 8.9 8.6 - 10.3 mg/dL    Albumin 3.7 3.4 - 5.0 g/dL    Alkaline Phosphatase 68 33 - 136 U/L    Total Protein 6.4 6.4 - 8.2 g/dL    AST 12 9 - 39 U/L    Bilirubin, Total 0.7 0.0 - 1.2 mg/dL    ALT 8 (L) 10 - 52 U/L   Magnesium    Collection Time: 08/08/24  6:27 PM   Result Value Ref Range    Magnesium 1.52 (L) 1.60 - 2.40 mg/dL   Phosphorus    Collection Time: 08/08/24  6:27 PM   Result Value Ref Range    Phosphorus 3.3 2.5 - 4.9 mg/dL   CBC    Collection Time: 08/08/24  6:28 PM   Result Value Ref Range    WBC 9.9 4.4 - 11.3 x10*3/uL    nRBC 0.0 0.0 - 0.0 /100 WBCs    RBC 3.68 (L) 4.50 - 5.90 x10*6/uL    Hemoglobin 10.0 (L) 13.5 - 17.5 g/dL    Hematocrit 30.7 (L) 41.0 - 52.0 %    MCV 83 80 - 100 fL    MCH 27.2 26.0 - 34.0 pg    MCHC 32.6 32.0 - 36.0 g/dL    RDW 15.8 (H) 11.5 - 14.5 %    Platelets 232 150 - 450 x10*3/uL   POCT GLUCOSE    Collection Time: 08/08/24  8:03 PM   Result Value Ref Range    POCT Glucose 99 74 - 99 mg/dL   CBC    Collection Time: 08/09/24  3:53 AM   Result Value Ref Range    WBC 8.2 4.4 - 11.3 x10*3/uL    nRBC 0.0 0.0 - 0.0 /100 WBCs    RBC 3.82 (L) 4.50 - 5.90 x10*6/uL    Hemoglobin 10.2 (L) 13.5 - 17.5 g/dL    Hematocrit 31.7 (L) 41.0 - 52.0 %    MCV 83 80 - 100 fL    MCH 26.7 26.0 - 34.0 pg    MCHC 32.2 32.0 - 36.0 g/dL    RDW 16.0 (H) 11.5 - 14.5 %    Platelets 217 150 - 450 x10*3/uL   Comprehensive metabolic panel    Collection Time: 08/09/24  3:53 AM   Result Value Ref Range    Glucose 119 (H) 74 - 99 mg/dL    Sodium 134  (L) 136 - 145 mmol/L    Potassium 4.2 3.5 - 5.3 mmol/L    Chloride 103 98 - 107 mmol/L    Bicarbonate 25 21 - 32 mmol/L    Anion Gap 10 10 - 20 mmol/L    Urea Nitrogen 20 6 - 23 mg/dL    Creatinine 1.02 0.50 - 1.30 mg/dL    eGFR 79 >60 mL/min/1.73m*2    Calcium 9.0 8.6 - 10.3 mg/dL    Albumin 3.4 3.4 - 5.0 g/dL    Alkaline Phosphatase 73 33 - 136 U/L    Total Protein 6.6 6.4 - 8.2 g/dL    AST 10 9 - 39 U/L    Bilirubin, Total 0.6 0.0 - 1.2 mg/dL    ALT 9 (L) 10 - 52 U/L   Magnesium    Collection Time: 08/09/24  3:53 AM   Result Value Ref Range    Magnesium 1.96 1.60 - 2.40 mg/dL   Phosphorus    Collection Time: 08/09/24  3:53 AM   Result Value Ref Range    Phosphorus 3.5 2.5 - 4.9 mg/dL   POCT GLUCOSE    Collection Time: 08/09/24  6:08 AM   Result Value Ref Range    POCT Glucose 115 (H) 74 - 99 mg/dL   Transthoracic Echo (TTE) Complete    Collection Time: 08/09/24  8:31 AM   Result Value Ref Range    AV mn grad 3.0 mmHg    AV pk agnes 1.27 m/s    LV Biplane EF 53 %    LVOT diam 2.00 cm    Tricuspid annular plane systolic excursion 1.6 cm    LA vol index A/L 38.2 ml/m2    LV EF 53 %    RV free wall pk S' 12.00 cm/s    RVSP 37.1 mmHg    LVIDd 5.50 cm    Aortic Valve Area by Continuity of Peak Velocity 2.50 cm2    AV pk grad 6.5 mmHg    Aortic Valve Area by Continuity of VTI 2.87 cm2    LV A4C EF 53.0    POCT GLUCOSE    Collection Time: 08/09/24 12:20 PM   Result Value Ref Range    POCT Glucose 127 (H) 74 - 99 mg/dL   SST TOP    Collection Time: 08/09/24 12:27 PM   Result Value Ref Range    Extra Tube Hold for add-ons.    Heparin Assay    Collection Time: 08/09/24 12:29 PM   Result Value Ref Range    Heparin Unfractionated 0.8 See Comment Below for Therapeutic Ranges IU/mL   ECG 12 Lead    Collection Time: 08/09/24  3:39 PM   Result Value Ref Range    Ventricular Rate 145 BPM    Atrial Rate 290 BPM    QRS Duration 80 ms    QT Interval 286 ms    QTC Calculation(Bazett) 444 ms    R Axis -27 degrees    T Axis 262 degrees     "QRS Count 24 beats    Q Onset 215 ms    T Offset 358 ms    QTC Fredericia 383 ms   POCT GLUCOSE    Collection Time: 08/09/24  5:05 PM   Result Value Ref Range    POCT Glucose 146 (H) 74 - 99 mg/dL     Troponin: No results found for: \"TROPONINI\"    EKG: Atrial fibrillation      ASSESSMENT:    Paroxysmal atrial fibrillation with rapid ventricle response.  Patient with history of A-fib ablation x 2  Left extremity hematoma  Diabetes mellitus  Hypertension  Hyperlipidemia  Morbid obesity class III  Anemia  History of negative cardiac catheterization 2012  Normal LV function by echo 8/9/2024, ejection fraction of 50 to 55%      PLAN:   As always, aggressive risk factor modification is strongly recommended. We should adhere to the JNC VIII guidelines for HTN management and the NCEPATP III guidelines for LDL-C management.  Continue with the amiodarone drip per protocol.  Change to 200 mg p.o. daily  Continue with Lopressor as well as Cardizem for heart rate control  Hold off anticoagulation due to left extremity hematoma.  Continue to monitor on telemetry  GI/DVT prophylaxis  Maintain potassium between 4-5 and magnesium greater than 2  Further recommendations to follow      Electronically signed by Benito Ron DO on 8/9/2024 at 5:28 PM   "

## 2024-08-09 NOTE — PROGRESS NOTES
[facility] Critical Care Medicine       Date:  8/9/2024  Patient:  Johnathan Akins  YOB: 1955  MRN:  [id number]   Admit Date:  8/8/2024  ========================================================================================================    Chief Complaint   Patient presents with    Leg Injury     Left leg ecchymosis from getting out of bed.  MT=[id number].          History of Present Illness:  Johnathan Akins is a 69 y.o. year old male patient with Past Medical History of paroxysmal atrial fibrillation (on Xarelto), BPH, GERD, DM2 who presented to [facility] emergency department 8/8 with complaints of left lower extremity swelling and bruising.  Per patient he was feeling at his baseline up until Sunday 8/4 when he woke up in the morning and had left lower extremity pain.  At that time he thought that he strained a muscle and did not seek treatment.  Over the next few days he noticed that leg had worsening pain, swelling, and bruising that was extending towards his groin therefore he sought medical treatment emergency department.  He denies any injury to the left lower extremity.  Does have a history of total knee arthroplasty to the left knee.  Patient denies any recent fevers or chills.  Denies any dizziness or syncope.  Denies any chest pain.  Does complain of shortness of breath on exertion and stated this started about a month ago.  Believes he has been in atrial fibrillation/atrial flutter for about 1 month, but states up to this point he has not been in A-fib for approximately 5 years.  Last dose of Xarelto was the morning of 8/8.  He follows with Dr. Montoya emergency.  Denies any nausea or vomiting.  Denies any urinary symptoms.     ED Course  -Vitals: T36.8, , /85, SpO2 90% on room air  -Labs: BBC 10.1, Hgb 10.7, , INR 2.2, Cr 1.25, BUN 28, Mg 1.59, lactate 1.4, BNP 63  -EKG: Atrial flutter with rate 140-150  -Imaging:     -CXR: Unremarkable     -X-ray left femur, knee,  tib-fib: No acute findings     -LLE venous duplex: No evidence of DVT     Patient was given a total of 40 mg diltiazem IV push and initial diltiazem infusion.  He was also given 500 mcg digoxin injection.  Heart rate remained 140 but patient was hemodynamically stable.  Patient admitted to the ICU for further care.    Interval ICU Events:  8/8: Admitted to ICU in the setting of atrial flutter.  Hemodynamically stable.  On amiodarone infusion.  Continue home metoprolol.  Noted to have LLE swelling and bruising.  Will obtain CT of left lower extremity to rule out joint effusion or vascular injury.  Hold Xarelto until active bleed is ruled out.  Consult placed to [facility] cardiology Dr. Ron.    8/9: No significant events overnight.  CT lower extremity showed medial thigh intramuscular hematoma, no signs of active extravasation.  Hemoglobin stable.  Converted to NSR. Remains on amiodarone. Will resume all home meds. Will discuss with [facility] Cardiology.    Medical History:  Past Medical History:   Diagnosis Date    Cardiac arrhythmia, unspecified     Irregular heartbeat    Personal history of other diseases of the respiratory system 04/24/2019    History of acute bronchitis    Personal history of other specified conditions 02/08/2022    History of gross hematuria    Personal history of other specified conditions 02/03/2021    History of urinary retention    Personal history of other specified conditions 02/02/2021    History of urinary urgency    Personal history of urinary (tract) infections 02/03/2021    History of urinary tract infection     Past Surgical History:   Procedure Laterality Date    CYSTOSCOPY  03/23/2015    Diagnostic Cystoscopy    KNEE SURGERY  03/23/2015    Knee Surgery    OTHER SURGICAL HISTORY  03/23/2015    Complex Bladder Flow Rate Studies     Medications Prior to Admission   Medication Sig Dispense Refill Last Dose    dilTIAZem ER (Tiazac) 300 mg 24 hr capsule Take 1 capsule (300 mg) by mouth once  daily.   2024    glipiZIDE XL (Glucotrol XL) 2.5 mg 24 hr tablet TAKE 1 TABLET BY MOUTH 3 TIMES  DAILY BEFORE EACH MEAL AND 1  TABLET BY MOUTH AT BEDTIME 360 tablet 3 2024    lisinopril 5 mg tablet Take 1 tablet (5 mg) by mouth once daily. 90 tablet 3 2024    metFORMIN (Glucophage) 1,000 mg tablet TAKE 1 TABLET BY MOUTH TWICE  DAILY 180 tablet 3 2024    metoprolol tartrate (Lopressor) 50 mg tablet TAKE 1 TABLET BY MOUTH TWICE  DAILY 180 tablet 3 2024    omeprazole (PriLOSEC) 40 mg DR capsule Take by mouth.   2024    rivaroxaban (Xarelto) 20 mg tablet Take 1 tablet (20 mg) by mouth once daily.   2024    simvastatin (Zocor) 20 mg tablet TAKE 1 TABLET BY MOUTH DAILY 90 tablet 3 2024    spironolactone (Aldactone) 25 mg tablet TAKE 1 TABLET BY MOUTH DAILY 90 tablet 3 2024    tamsulosin (Flomax) 0.4 mg 24 hr capsule Take 1 capsule (0.4 mg) by mouth once daily. 90 capsule 1 2024    terazosin (Hytrin) 2 mg capsule Take 1 capsule (2 mg) by mouth once daily at bedtime. 90 capsule 3 2024    trospium (Sanctura) 20 mg tablet Take 3 tablets (60 mg) by mouth once daily.   2024     Benzonatate  Social History     Tobacco Use    Smoking status: Former     Current packs/day: 0.00     Average packs/day: 1 pack/day for 25.0 years (25.0 ttl pk-yrs)     Types: Cigarettes     Start date:      Quit date:      Years since quittin.6     Passive exposure: Past    Smokeless tobacco: Never   Substance Use Topics    Alcohol use: Not Currently    Drug use: Never     Family History   Problem Relation Name Age of Onset    Prostate cancer Father      Lung cancer Brother      Esophageal cancer Brother         [facility] Medications:    amiodarone, 0.5-1 mg/min, Last Rate: 0.5 mg/min (24 0727)  heparin, 0-4,000 Units/hr          Current Facility-Administered Medications:     acetaminophen (Tylenol) tablet 650 mg, 650 mg, oral, q6h PRN, Wang Bernardo, APRN-CNP    amiodarone  (Nexterone) 360 mg in dextrose,iso-osm 200 mL (1.8 mg/mL) infusion (premix), 0.5-1 mg/min, intravenous, Continuous, LIONEL Espinosa-CNP, Last Rate: 16.67 mL/hr at 08/09/24 0727, 0.5 mg/min at 08/09/24 0727    dextrose 50 % injection 12.5 g, 12.5 g, intravenous, q15 min PRN, LIONEL Epsinosa-CNP    dextrose 50 % injection 25 g, 25 g, intravenous, q15 min PRN, LIONEL Espinosa-CNP    [Held by provider] dilTIAZem CD (Cardizem CD) 24 hr capsule 300 mg, 300 mg, oral, Daily, LIONEL Espinosa-CNP    [Held by provider] glipiZIDE XL (Glucotrol XL) 24 hr tablet 2.5 mg, 2.5 mg, oral, Daily with breakfast, LIONEL Espinosa-CNP    heparin 25,000 Units in dextrose 5% 250 mL (100 Units/mL) infusion (premix), 0-4,000 Units/hr, intravenous, Continuous, LIONEL Espinosa-CNP    heparin bolus from bag 3,000-4,000 Units, 3,000-4,000 Units, intravenous, q4h PRN, Wang Bernardo APRN-CNP    heparin bolus from bag 4,000 Units, 4,000 Units, intravenous, Once, LIONEL Espinosa-CNP    insulin lispro (HumaLOG) injection 0-10 Units, 0-10 Units, subcutaneous, Before meals & nightly, LIONEL Espinosa-CNP    [Held by provider] lisinopril tablet 5 mg, 5 mg, oral, Daily, LIONEL Espinosa-CNP    magnesium sulfate 2 g in sterile water for injection 50 mL, 2 g, intravenous, Once, LIONEL Melvin-CNP, Last Rate: 25 mL/hr at 08/09/24 0657, 2 g at 08/09/24 0657    [Held by provider] metFORMIN (Glucophage) tablet 1,000 mg, 1,000 mg, oral, BID, WangAVINASH Miller    metoprolol tartrate (Lopressor) tablet 50 mg, 50 mg, oral, BID, Dhara Still, APRN-CNP, 50 mg at 08/08/24 2041    oxybutynin (Ditropan) tablet 5 mg, 5 mg, oral, Nightly, AVINASH Espinosa, 5 mg at 08/08/24 2041    pantoprazole (ProtoNix) EC tablet 40 mg, 40 mg, oral, Daily before breakfast, AVINASH Espinosa    perflutren lipid microspheres (Definity) injection 0.5-10 mL of dilution, 0.5-10  "mL of dilution, intravenous, Once in imaging, AVINASH Espinosa    [Held by provider] rivaroxaban (Xarelto) tablet 20 mg, 20 mg, oral, Daily with evening meal, AVINASH Espinosa    simvastatin (Zocor) tablet 20 mg, 20 mg, oral, Daily, AVINASH Sharif, 20 mg at 08/08/24 2041    [Held by provider] spironolactone (Aldactone) tablet 25 mg, 25 mg, oral, Daily, AVINASH Espinosa    tamsulosin (Flomax) 24 hr capsule 0.4 mg, 0.4 mg, oral, Daily, AVINASH Sharif    terazosin (Hytrin) capsule 2 mg, 2 mg, oral, Nightly, AVINASH Sharif, 2 mg at 08/08/24 2041    Review of Systems:  14 point review of systems was completed and negative except for those specially mention in my HPI    Physical Exam:    Heart Rate:  []   Temp:  [35.3 °C (95.6 °F)-36.8 °C (98.2 °F)]   Resp:  []   BP: ()/(28-95)   Height:  [172.7 cm (5' 7.99\")-172.7 cm (5' 8\")]   Weight:  [122 kg (268 lb)-125 kg (276 lb 7.3 oz)]   SpO2:  [93 %-98 %]     Physical Exam  Constitutional:       General: He is not in acute distress.     Appearance: He is obese. He is not ill-appearing or toxic-appearing.   HENT:      Head: Normocephalic and atraumatic.      Nose: Nose normal.      Mouth/Throat:      Mouth: Mucous membranes are moist.      Pharynx: Oropharynx is clear.   Eyes:      General: No scleral icterus.     Extraocular Movements: Extraocular movements intact.      Pupils: Pupils are equal, round, and reactive to light.   Cardiovascular:      Rate and Rhythm: Tachycardia present. Rhythm irregular.      Pulses: Normal pulses.      Heart sounds: Normal heart sounds. No murmur heard.     No friction rub. No gallop.      Comments: Atrial flutter  Pulmonary:      Effort: Pulmonary effort is normal. No respiratory distress.      Breath sounds: Normal breath sounds. No wheezing or rales.   Abdominal:      General: There is no distension.      Palpations: Abdomen is soft.   Musculoskeletal:       "   General: Normal range of motion.      Cervical back: Normal range of motion and neck supple.      Right lower leg: Edema present.      Left lower leg: Edema present.   Skin:     General: Skin is warm and dry.      Capillary Refill: Capillary refill takes less than 2 seconds.      Comments: Significant bruising to left lower extremity from knee to groin region   Neurological:      General: No focal deficit present.      Mental Status: He is alert and oriented to person, place, and time. Mental status is at baseline.         Objective:    I have reviewed all medications, laboratory results, and imaging pertinent for today's encounter.           Intake/Output Summary (Last 24 hours) at 8/9/2024 0746  Last data filed at 8/9/2024 0600  Gross per 24 hour   Intake 675 ml   Output 1750 ml   Net -1075 ml         Assessment/Plan:    I am currently managing this critically ill patient for the following problems:    Neuro/Psych/Pain Ctrl/Sedation:  No active issues, mentation at baseline  -- Tylenol for mild pain     Respiratory/ENT:  No acute issues, on room air. Monitor spO2     Cardiovascular:  #Atrial Flutter  #History of Paroxysmal Atrial Fibrillation s/p multiple cardioversion and ablations  #History of HTN, HLD  Believes he has been in atrial flutter since Sunday 8/4, last dose of Xarelto morning of 8/8  -- Troponin unremarkable, BNP unremarkable, no signs of acute CHF exacerbation  -- Echocardiogram with normal LVEF  -- Hold home Xarelto for now --> consider resumption in 24-48 hours  -- Resume home metoprolol, Diltiazem, Lisinopril, spironolactone  -- Continue amiodarone infusion, will discuss about oral continuation or discontinuation with [facility]  -- Consult to [facility] Cardiology Dr. Ron, may need DCCV     GI:  #GERD  -- Continue home PPI  -- Cardiac Diet     Renal/Volume Status (Intra & Extravascular):  #BPH  Follows with Dr. Patten  -- Continue Oxybutynin and Tamsulosin  -- Strict intake and  output  -- Daily BMP and electrolyte repletion    Endocrine  #Non-insulin-dependent DM2  -- Hold home metformin and glipizide for now  -- Moderate SSI with meals     Infectious Disease:  No overt signs of infection, monitor off abx     Heme/Onc:  #Normocytic Anemia  Hgb from 2019 of 12.8, down to 10.7 this admission, small intramuscular hematoma within left thigh  -- Holding Xarelto, consider resumption in 24-48 hours    ORTHO/MSK:  #LLE Injury  -- CT imaging with medial thigh intramuscular hematoma and surrounding subcutaneous soft tissue swelling  -- Apply ANNEMARIE hose to lower extremities to help with swelling  -- PT/OT     Ethics/Code Status:  Full code     :  DVT Prophylaxis: Holding  GI Prophylaxis: Home PPI  Bowel Regimen: As needed  Diet: NPO  CVC: no  Veronica: no  Louis: no  Restraints: no  Dispo: ICU    Critical Care Time:  40 minutes spent in preparing to see patient (I.e. review of medical records), evaluation of diagnostics (I.e. labs, imaging, etc.), documentation, discussing plan of care with patient/ family/ caregiver, and/ or coordination of care with multidisciplinary team. Time does not include completion of procedure time.       Wang Bernardo, APRN-CNP  Pulmonary & Critical Care Medicine  [facility]

## 2024-08-10 ENCOUNTER — APPOINTMENT (OUTPATIENT)
Dept: CARDIOLOGY | Facility: HOSPITAL | Age: 69
DRG: 309 | End: 2024-08-10
Payer: MEDICARE

## 2024-08-10 LAB
ALBUMIN SERPL BCP-MCNC: 3.7 G/DL (ref 3.4–5)
ALP SERPL-CCNC: 72 U/L (ref 33–136)
ALT SERPL W P-5'-P-CCNC: 7 U/L (ref 10–52)
ANION GAP SERPL CALC-SCNC: 13 MMOL/L (ref 10–20)
AST SERPL W P-5'-P-CCNC: 11 U/L (ref 9–39)
ATRIAL RATE: 271 BPM
ATRIAL RATE: 290 BPM
BILIRUB SERPL-MCNC: 0.7 MG/DL (ref 0–1.2)
BUN SERPL-MCNC: 20 MG/DL (ref 6–23)
CALCIUM SERPL-MCNC: 8.9 MG/DL (ref 8.6–10.3)
CHLORIDE SERPL-SCNC: 101 MMOL/L (ref 98–107)
CO2 SERPL-SCNC: 20 MMOL/L (ref 21–32)
CREAT SERPL-MCNC: 1.33 MG/DL (ref 0.5–1.3)
EGFRCR SERPLBLD CKD-EPI 2021: 57 ML/MIN/1.73M*2
ERYTHROCYTE [DISTWIDTH] IN BLOOD BY AUTOMATED COUNT: 15.9 % (ref 11.5–14.5)
GLUCOSE BLD MANUAL STRIP-MCNC: 132 MG/DL (ref 74–99)
GLUCOSE BLD MANUAL STRIP-MCNC: 142 MG/DL (ref 74–99)
GLUCOSE BLD MANUAL STRIP-MCNC: 192 MG/DL (ref 74–99)
GLUCOSE BLD MANUAL STRIP-MCNC: 199 MG/DL (ref 74–99)
GLUCOSE SERPL-MCNC: 133 MG/DL (ref 74–99)
HCT VFR BLD AUTO: 32.5 % (ref 41–52)
HGB BLD-MCNC: 10.2 G/DL (ref 13.5–17.5)
MAGNESIUM SERPL-MCNC: 1.87 MG/DL (ref 1.6–2.4)
MCH RBC QN AUTO: 26.6 PG (ref 26–34)
MCHC RBC AUTO-ENTMCNC: 31.4 G/DL (ref 32–36)
MCV RBC AUTO: 85 FL (ref 80–100)
NRBC BLD-RTO: 0 /100 WBCS (ref 0–0)
PHOSPHATE SERPL-MCNC: 3.8 MG/DL (ref 2.5–4.9)
PLATELET # BLD AUTO: 214 X10*3/UL (ref 150–450)
POTASSIUM SERPL-SCNC: 4.4 MMOL/L (ref 3.5–5.3)
PROT SERPL-MCNC: 6.8 G/DL (ref 6.4–8.2)
Q ONSET: 212 MS
Q ONSET: 215 MS
QRS COUNT: 16 BEATS
QRS COUNT: 24 BEATS
QRS DURATION: 80 MS
QRS DURATION: 88 MS
QT INTERVAL: 220 MS
QT INTERVAL: 286 MS
QTC CALCULATION(BAZETT): 277 MS
QTC CALCULATION(BAZETT): 444 MS
QTC FREDERICIA: 257 MS
QTC FREDERICIA: 383 MS
R AXIS: -27 DEGREES
R AXIS: -5 DEGREES
RBC # BLD AUTO: 3.83 X10*6/UL (ref 4.5–5.9)
SODIUM SERPL-SCNC: 130 MMOL/L (ref 136–145)
T AXIS: 262 DEGREES
T AXIS: 36 DEGREES
T OFFSET: 322 MS
T OFFSET: 358 MS
VENTRICULAR RATE: 145 BPM
VENTRICULAR RATE: 96 BPM
WBC # BLD AUTO: 8.8 X10*3/UL (ref 4.4–11.3)

## 2024-08-10 PROCEDURE — 99291 CRITICAL CARE FIRST HOUR: CPT

## 2024-08-10 PROCEDURE — 2020000001 HC ICU ROOM DAILY

## 2024-08-10 PROCEDURE — 2500000001 HC RX 250 WO HCPCS SELF ADMINISTERED DRUGS (ALT 637 FOR MEDICARE OP)

## 2024-08-10 PROCEDURE — 36415 COLL VENOUS BLD VENIPUNCTURE: CPT

## 2024-08-10 PROCEDURE — 2500000002 HC RX 250 W HCPCS SELF ADMINISTERED DRUGS (ALT 637 FOR MEDICARE OP, ALT 636 FOR OP/ED)

## 2024-08-10 PROCEDURE — 93005 ELECTROCARDIOGRAM TRACING: CPT

## 2024-08-10 PROCEDURE — 93010 ELECTROCARDIOGRAM REPORT: CPT | Performed by: INTERNAL MEDICINE

## 2024-08-10 PROCEDURE — 83735 ASSAY OF MAGNESIUM: CPT

## 2024-08-10 PROCEDURE — 2500000004 HC RX 250 GENERAL PHARMACY W/ HCPCS (ALT 636 FOR OP/ED)

## 2024-08-10 PROCEDURE — 84075 ASSAY ALKALINE PHOSPHATASE: CPT

## 2024-08-10 PROCEDURE — 82947 ASSAY GLUCOSE BLOOD QUANT: CPT

## 2024-08-10 PROCEDURE — 84100 ASSAY OF PHOSPHORUS: CPT

## 2024-08-10 PROCEDURE — 85027 COMPLETE CBC AUTOMATED: CPT

## 2024-08-10 RX ORDER — AMIODARONE HYDROCHLORIDE 200 MG/1
200 TABLET ORAL DAILY
Status: DISCONTINUED | OUTPATIENT
Start: 2024-08-10 | End: 2024-08-11 | Stop reason: HOSPADM

## 2024-08-10 RX ADMIN — TAMSULOSIN HYDROCHLORIDE 0.4 MG: 0.4 CAPSULE ORAL at 08:38

## 2024-08-10 RX ADMIN — INSULIN LISPRO 2 UNITS: 100 INJECTION, SOLUTION INTRAVENOUS; SUBCUTANEOUS at 11:57

## 2024-08-10 RX ADMIN — METOPROLOL TARTRATE 50 MG: 50 TABLET, FILM COATED ORAL at 21:03

## 2024-08-10 RX ADMIN — PANTOPRAZOLE SODIUM 40 MG: 40 TABLET, DELAYED RELEASE ORAL at 06:07

## 2024-08-10 RX ADMIN — METOPROLOL TARTRATE 50 MG: 50 TABLET, FILM COATED ORAL at 08:38

## 2024-08-10 RX ADMIN — TERAZOSIN HYDROCHLORIDE 2 MG: 1 CAPSULE ORAL at 21:03

## 2024-08-10 RX ADMIN — RIVAROXABAN 20 MG: 20 TABLET, FILM COATED ORAL at 18:00

## 2024-08-10 RX ADMIN — OXYBUTYNIN CHLORIDE 5 MG: 5 TABLET ORAL at 21:03

## 2024-08-10 RX ADMIN — SIMVASTATIN 20 MG: 20 TABLET, FILM COATED ORAL at 21:03

## 2024-08-10 RX ADMIN — DILTIAZEM HYDROCHLORIDE 300 MG: 300 CAPSULE, COATED, EXTENDED RELEASE ORAL at 08:38

## 2024-08-10 RX ADMIN — INSULIN LISPRO 2 UNITS: 100 INJECTION, SOLUTION INTRAVENOUS; SUBCUTANEOUS at 21:03

## 2024-08-10 RX ADMIN — AMIODARONE HYDROCHLORIDE 0.5 MG/MIN: 1.8 INJECTION, SOLUTION INTRAVENOUS at 06:09

## 2024-08-10 RX ADMIN — AMIODARONE HYDROCHLORIDE 200 MG: 200 TABLET ORAL at 08:38

## 2024-08-10 ASSESSMENT — PAIN - FUNCTIONAL ASSESSMENT
PAIN_FUNCTIONAL_ASSESSMENT: 0-10

## 2024-08-10 ASSESSMENT — COGNITIVE AND FUNCTIONAL STATUS - GENERAL
MOBILITY SCORE: 24
DAILY ACTIVITIY SCORE: 24

## 2024-08-10 ASSESSMENT — PAIN SCALES - GENERAL
PAINLEVEL_OUTOF10: 0 - NO PAIN

## 2024-08-10 NOTE — PROGRESS NOTES
[facility] Critical Care Medicine       Date:  8/10/2024  Patient:  Johnathna Akins  YOB: 1955  MRN:  [id number]   Admit Date:  8/8/2024  ========================================================================================================    Chief Complaint   Patient presents with    Leg Injury     Left leg ecchymosis from getting out of bed.  NY=102.          History of Present Illness:  Johnathan Akins is a 69 y.o. year old male patient with Past Medical History of paroxysmal atrial fibrillation (on Xarelto), BPH, GERD, DM2 who presented to [facility] emergency department 8/8 with complaints of left lower extremity swelling and bruising.  Per patient he was feeling at his baseline up until Sunday 8/4 when he woke up in the morning and had left lower extremity pain.  At that time he thought that he strained a muscle and did not seek treatment.  Over the next few days he noticed that leg had worsening pain, swelling, and bruising that was extending towards his groin therefore he sought medical treatment emergency department.  He denies any injury to the left lower extremity.  Does have a history of total knee arthroplasty to the left knee.  Patient denies any recent fevers or chills.  Denies any dizziness or syncope.  Denies any chest pain.  Does complain of shortness of breath on exertion and stated this started about a month ago.  Believes he has been in atrial fibrillation/atrial flutter for about 1 month, but states up to this point he has not been in A-fib for approximately 5 years.  Last dose of Xarelto was the morning of 8/8.  He follows with Dr. Montoya emergency.  Denies any nausea or vomiting.  Denies any urinary symptoms.     ED Course  -Vitals: T36.8, , /85, SpO2 90% on room air  -Labs: BBC 10.1, Hgb 10.7, , INR 2.2, Cr 1.25, BUN 28, Mg 1.59, lactate 1.4, BNP 63  -EKG: Atrial flutter with rate 140-150  -Imaging:     -CXR: Unremarkable     -X-ray left femur, knee,  tib-fib: No acute findings     -LLE venous duplex: No evidence of DVT     Patient was given a total of 40 mg diltiazem IV push and initial diltiazem infusion.  He was also given 500 mcg digoxin injection.  Heart rate remained 140 but patient was hemodynamically stable.  Patient admitted to the ICU for further care.    Interval ICU Events:  8/8: Admitted to ICU in the setting of atrial flutter.  Hemodynamically stable.  On amiodarone infusion.  Continue home metoprolol.  Noted to have LLE swelling and bruising.  Will obtain CT of left lower extremity to rule out joint effusion or vascular injury.  Hold Xarelto until active bleed is ruled out.  Consult placed to [facility] cardiology Dr. Ron.    8/9: No significant events overnight.  CT lower extremity showed medial thigh intramuscular hematoma, no signs of active extravasation.  Hemoglobin stable.  Converted to NSR. Remains on amiodarone. Will resume all home meds. Will discuss with [facility] Cardiology.    8/10: No significant events overnight.  Patient remains on amiodarone infusion, remains in atrial fibrillation but rates are controlled between 90 and 110.  Will transition to oral amiodarone 200 mg daily today.  Will resume Xarelto monitor left leg bruising/swelling.  Slight LISA today, avoid any further diuresis, hold lisinopril, will monitor renal function daily.  Further recommendations per [facility] cardiology.    Medical History:  Past Medical History:   Diagnosis Date    Cardiac arrhythmia, unspecified     Irregular heartbeat    Personal history of other diseases of the respiratory system 04/24/2019    History of acute bronchitis    Personal history of other specified conditions 02/08/2022    History of gross hematuria    Personal history of other specified conditions 02/03/2021    History of urinary retention    Personal history of other specified conditions 02/02/2021    History of urinary urgency    Personal history of urinary (tract) infections 02/03/2021     History of urinary tract infection     Past Surgical History:   Procedure Laterality Date    CYSTOSCOPY  2015    Diagnostic Cystoscopy    KNEE SURGERY  2015    Knee Surgery    OTHER SURGICAL HISTORY  2015    Complex Bladder Flow Rate Studies     Medications Prior to Admission   Medication Sig Dispense Refill Last Dose    dilTIAZem ER (Tiazac) 300 mg 24 hr capsule Take 1 capsule (300 mg) by mouth once daily.   2024    glipiZIDE XL (Glucotrol XL) 2.5 mg 24 hr tablet TAKE 1 TABLET BY MOUTH 3 TIMES  DAILY BEFORE EACH MEAL AND 1  TABLET BY MOUTH AT BEDTIME 360 tablet 3 2024    lisinopril 5 mg tablet Take 1 tablet (5 mg) by mouth once daily. 90 tablet 3 2024    metFORMIN (Glucophage) 1,000 mg tablet TAKE 1 TABLET BY MOUTH TWICE  DAILY 180 tablet 3 2024    metoprolol tartrate (Lopressor) 50 mg tablet TAKE 1 TABLET BY MOUTH TWICE  DAILY 180 tablet 3 2024    omeprazole (PriLOSEC) 40 mg DR capsule Take by mouth.   2024    rivaroxaban (Xarelto) 20 mg tablet Take 1 tablet (20 mg) by mouth once daily.   2024    simvastatin (Zocor) 20 mg tablet TAKE 1 TABLET BY MOUTH DAILY 90 tablet 3 2024    spironolactone (Aldactone) 25 mg tablet TAKE 1 TABLET BY MOUTH DAILY 90 tablet 3 2024    tamsulosin (Flomax) 0.4 mg 24 hr capsule Take 1 capsule (0.4 mg) by mouth once daily. 90 capsule 1 2024    terazosin (Hytrin) 2 mg capsule Take 1 capsule (2 mg) by mouth once daily at bedtime. 90 capsule 3 2024    trospium (Sanctura) 20 mg tablet Take 3 tablets (60 mg) by mouth once daily.   2024     Benzonatate  Social History     Tobacco Use    Smoking status: Former     Current packs/day: 0.00     Average packs/day: 1 pack/day for 25.0 years (25.0 ttl pk-yrs)     Types: Cigarettes     Start date:      Quit date:      Years since quittin.6     Passive exposure: Past    Smokeless tobacco: Never   Substance Use Topics    Alcohol use: Not Currently    Drug use: Never     Family  History   Problem Relation Name Age of Onset    Prostate cancer Father      Lung cancer Brother      Esophageal cancer Brother         [facility] Medications:    amiodarone, 0.5-1 mg/min, Last Rate: 0.5 mg/min (08/10/24 0609)          Current Facility-Administered Medications:     acetaminophen (Tylenol) tablet 650 mg, 650 mg, oral, q6h PRN, LIONEL Espinosa-CNP    amiodarone (Nexterone) 360 mg in dextrose,iso-osm 200 mL (1.8 mg/mL) infusion (premix), 0.5-1 mg/min, intravenous, Continuous, LIONEL Espinosa-CNP, Last Rate: 16.67 mL/hr at 08/10/24 0609, 0.5 mg/min at 08/10/24 0609    amiodarone (Pacerone) tablet 200 mg, 200 mg, oral, Daily, LIONEL Espinosa-CNP    dextrose 50 % injection 12.5 g, 12.5 g, intravenous, q15 min PRN, Wang Bernardo APRN-CNP    dextrose 50 % injection 25 g, 25 g, intravenous, q15 min PRN, Wang  Tova APRN-CNP    dilTIAZem CD (Cardizem CD) 24 hr capsule 300 mg, 300 mg, oral, Daily, LIONEL Espinosa-CNP, 300 mg at 08/09/24 1228    [Held by provider] glipiZIDE XL (Glucotrol XL) 24 hr tablet 2.5 mg, 2.5 mg, oral, Daily with breakfast, LIONEL Espinosa-CNP    insulin lispro (HumaLOG) injection 0-10 Units, 0-10 Units, subcutaneous, Before meals & nightly, LIONEL Espinosa-CNP    [Held by provider] lisinopril tablet 5 mg, 5 mg, oral, Daily, LIONEL Espinosa-CNP, 5 mg at 08/09/24 1228    [Held by provider] metFORMIN (Glucophage) tablet 1,000 mg, 1,000 mg, oral, BID, Wang Bernardo APRN-MARGIE    metoprolol tartrate (Lopressor) tablet 50 mg, 50 mg, oral, BID, AVINASH Sharif, 50 mg at 08/09/24 2101    oxybutynin (Ditropan) tablet 5 mg, 5 mg, oral, Nightly, AVINASH Espinosa, 5 mg at 08/09/24 2101    pantoprazole (ProtoNix) EC tablet 40 mg, 40 mg, oral, Daily before breakfast, AVINASH Espinosa, 40 mg at 08/10/24 0607    rivaroxaban (Xarelto) tablet 20 mg, 20 mg, oral, Daily with evening meal, Wang Bernardo,  APRN-CNP    simvastatin (Zocor) tablet 20 mg, 20 mg, oral, Daily, Dhara Still APRN-CNP, 20 mg at 08/09/24 2101    spironolactone (Aldactone) tablet 25 mg, 25 mg, oral, Daily, Wang Bernardo, APRN-CNP, 25 mg at 08/09/24 1228    tamsulosin (Flomax) 24 hr capsule 0.4 mg, 0.4 mg, oral, Daily, Dhara Still APRN-CNP, 0.4 mg at 08/09/24 0839    terazosin (Hytrin) capsule 2 mg, 2 mg, oral, Nightly, Dhara Still APRN-CNP, 2 mg at 08/09/24 2101    Review of Systems:  14 point review of systems was completed and negative except for those specially mention in my HPI    Physical Exam:    Heart Rate:  []   Temp:  [35.7 °C (96.3 °F)-36.2 °C (97.2 °F)]   Resp:  [20-37]   BP: ()/()   SpO2:  [90 %-95 %]     Physical Exam  Constitutional:       General: He is not in acute distress.     Appearance: He is obese. He is not ill-appearing or toxic-appearing.   HENT:      Head: Normocephalic and atraumatic.      Nose: Nose normal.      Mouth/Throat:      Mouth: Mucous membranes are moist.      Pharynx: Oropharynx is clear.   Eyes:      General: No scleral icterus.     Extraocular Movements: Extraocular movements intact.      Pupils: Pupils are equal, round, and reactive to light.   Cardiovascular:      Rate and Rhythm: Tachycardia present. Rhythm irregular.      Pulses: Normal pulses.      Heart sounds: Normal heart sounds. No murmur heard.     No friction rub. No gallop.      Comments: Atrial flutter  Pulmonary:      Effort: Pulmonary effort is normal. No respiratory distress.      Breath sounds: Normal breath sounds. No wheezing or rales.   Abdominal:      General: There is no distension.      Palpations: Abdomen is soft.   Musculoskeletal:         General: Normal range of motion.      Cervical back: Normal range of motion and neck supple.      Right lower leg: Edema present.      Left lower leg: Edema present.   Skin:     General: Skin is warm and dry.      Capillary Refill: Capillary refill takes  less than 2 seconds.      Comments: Significant bruising to left lower extremity from knee to groin region   Neurological:      General: No focal deficit present.      Mental Status: He is alert and oriented to person, place, and time. Mental status is at baseline.         Objective:    I have reviewed all medications, laboratory results, and imaging pertinent for today's encounter.           Intake/Output Summary (Last 24 hours) at 8/10/2024 0805  Last data filed at 8/10/2024 0758  Gross per 24 hour   Intake 532 ml   Output 1900 ml   Net -1368 ml         Assessment/Plan:    I am currently managing this critically ill patient for the following problems:    Neuro/Psych/Pain Ctrl/Sedation:  No active issues, mentation at baseline  -- Tylenol for mild pain     Respiratory/ENT:  No acute issues, on room air. Monitor spO2     Cardiovascular:  #Atrial Flutter  #History of Paroxysmal Atrial Fibrillation s/p multiple cardioversion and ablations  #History of HTN, HLD  Believes he has been in atrial flutter since Sunday 8/4, last dose of Xarelto morning of 8/8  -- Echocardiogram with normal LVEF  -- Resume home Xarelto, monitor LLE bruising/swelling  -- Continue home metoprolol and diltiazem  -- Transition to oral amiodarone 200 mg daily  -- Hold home lisinopril and spironolactone due to LISA, will resume when able  --Further recommendations per [facility] cardiology     GI:  #GERD  -- Continue home PPI  -- Cardiac Diet     Renal/Volume Status (Intra & Extravascular):  #BPH  Follows with Dr. Patten  -- Continue Oxybutynin and Tamsulosin  -- Strict intake and output  -- Daily BMP and electrolyte repletion    Endocrine  #Non-insulin-dependent DM2  -- Hold home metformin and glipizide for now  -- Moderate SSI with meals     Infectious Disease:  No overt signs of infection, monitor off abx     Heme/Onc:  #Normocytic Anemia  Hgb from 2019 of 12.8, down to 10.7 this admission, small intramuscular hematoma within left thigh  -- Resuming  Xarelto today, monitor for worsening bruising/swelling    ORTHO/MSK:  -- Apply ANNEMARIE hose to lower extremities to help with swelling  -- PT/OT     Ethics/Code Status:  Full code     :  DVT Prophylaxis: Holding  GI Prophylaxis: Home PPI  Bowel Regimen: As needed  Diet: Cardiac  CVC: no  Veronica: no  Louis: no  Restraints: no  Dispo: ICU    Critical Care Time:  40 minutes spent in preparing to see patient (I.e. review of medical records), evaluation of diagnostics (I.e. labs, imaging, etc.), documentation, discussing plan of care with patient/ family/ caregiver, and/ or coordination of care with multidisciplinary team. Time does not include completion of procedure time.       Wang Bernardo APRN-CNP  Pulmonary & Critical Care Medicine  [facility]

## 2024-08-10 NOTE — PROGRESS NOTES
Chief Complaint   Patient presents with    Leg Injury       Left leg ecchymosis from getting out of bed.  QZ=[id number].           Patient is a 69 y.o. male who presents with a chief complaint of left leg pain. Patient is followed on a regular basis by Dr. Livan Bartholomew DO.  Patient with past medical history of atrial fibrillation status post ablation x 2, diabetes mellitus, hypertension, hypercholesteremia, morbid obesity class III, venous insufficiency.  Patient noted to have left lower extremity hematoma.  He was also noted to be in atrial fibrillation with rapid ventricle response initiated on amiodarone drip.  He briefly converted to normal sinus rhythm however reverted back to atrial fibrillation with rapid ventricle response.  He denies any chest pain chest pressure heaviness.  No CHF type symptoms.  Patient has been compliant with his medications.  Hemoglobin is 10.3.     Status post echocardiogram today with ejection fraction of 50 to 55%, mild mitral and tricuspid regurgitation.    8/10/2024: Converted to normal sinus rhythm.  Remains on amnio drip.  Renal function slightly worsening.  Diuretics placed on hold.        Medical History        Past Medical History:   Diagnosis Date    Cardiac arrhythmia, unspecified       Irregular heartbeat    Personal history of other diseases of the respiratory system 04/24/2019     History of acute bronchitis    Personal history of other specified conditions 02/08/2022     History of gross hematuria    Personal history of other specified conditions 02/03/2021     History of urinary retention    Personal history of other specified conditions 02/02/2021     History of urinary urgency    Personal history of urinary (tract) infections 02/03/2021     History of urinary tract infection         Problem List       Patient Active Problem List   Diagnosis    Abnormal urine cytology    Abnormal weight gain    Acute left-sided low back pain without sciatica    Controlled type 2 diabetes  mellitus with stage 3 chronic kidney disease (Multi)    Hypercholesterolemia    Hypertension    Incontinence    Normochromic normocytic anemia    Paroxysmal atrial fibrillation with RVR (Multi)    Type 2 diabetes mellitus without complication, without long-term current use of insulin (Multi)    Tingling of both feet    Hematochezia    Functional diarrhea    Increased urinary frequency    Cardiomyopathy, nonischemic (Multi)    Obesity, morbid (Multi)    Retention of urine    Atrial flutter, unspecified type (Multi)            Surgical History         Past Surgical History:   Procedure Laterality Date    CYSTOSCOPY   2015     Diagnostic Cystoscopy    KNEE SURGERY   2015     Knee Surgery    OTHER SURGICAL HISTORY   2015     Complex Bladder Flow Rate Studies            Social History   Social History            Socioeconomic History    Marital status:    Tobacco Use    Smoking status: Former       Current packs/day: 0.00       Average packs/day: 1 pack/day for 25.0 years (25.0 ttl pk-yrs)       Types: Cigarettes       Start date:        Quit date:        Years since quittin.6       Passive exposure: Past    Smokeless tobacco: Never   Substance and Sexual Activity    Alcohol use: Not Currently    Drug use: Never      Social Determinants of Health           Financial Resource Strain: Low Risk  (2024)     Overall Financial Resource Strain (CARDIA)      Difficulty of Paying Living Expenses: Not very hard   Transportation Needs: No Transportation Needs (2024)     PRAPARE - Transportation      Lack of Transportation (Medical): No      Lack of Transportation (Non-Medical): No   Housing Stability: Low Risk  (2024)     Housing Stability Vital Sign      Unable to Pay for Housing in the Last Year: No      Number of Times Moved in the Last Year: 0      Homeless in the Last Year: No            Family History          Family History   Problem Relation Name Age of Onset    Prostate  cancer Father        Lung cancer Brother        Esophageal cancer Brother                Current Medications             Current Facility-Administered Medications   Medication Dose Route Frequency Provider Last Rate Last Admin    acetaminophen (Tylenol) tablet 650 mg  650 mg oral q6h [facility]N [facility]        amiodarone (Nexterone) 360 mg in dextrose,iso-osm 200 mL (1.8 mg/mL) infusion (premix)  0.5-1 mg/min [facility] 33.3 mL/hr at 08/09/24 1543 1 mg/min at 08/09/24 1543    dextrose 50 % injection 12.5 g  12.5 g intravenous q15 min PRN [facility], APRN-CNP        dextrose 50 % injection 25 g  25 g intravenous q15 min PRN [facility], APRN-CNP        dilTIAZem CD (Cardizem CD) 24 hr capsule 300 mg  300 mg oral [facility]   300 mg at 08/09/24 1228    [Held by provider] glipiZIDE XL (Glucotrol XL) 24 hr tablet 2.5 mg  2.5 mg oral Daily with breakfast [facility], APRN-CNP        insulin lispro (HumaLOG) injection 0-10 Units  0-10 Units subcutaneous Before meals & nightly [facility], LIONEL-CNP        lisinopril tablet 5 mg  5 mg oral [facility]   5 mg at 08/09/24 1228    [Held by provider] metFORMIN (Glucophage) tablet 1,000 mg  1,000 mg oral BID [facility], APRN-MARGIE        metoprolol tartrate (Lopressor) tablet 50 mg  50 mg oral BID AVINASH Sharif   50 mg at 08/09/24 0839    oxybutynin (Ditropan) tablet 5 mg  5 mg oral Nightly [facility], LIONEL-CNP   5 mg at 08/08/24 2041    pantoprazole (ProtoNix) EC tablet 40 mg  40 mg oral Daily before breakfast [facility], APRN-CNP        [Held by provider] rivaroxaban (Xarelto) tablet 20 mg  20 mg oral Daily with evening meal [facility], APRN-MARGIE        simvastatin (Zocor) tablet 20 mg  20 mg oral Daily Dhara Still APRN-CNP   20 mg at 08/08/24 2041    spironolactone (Aldactone) tablet 25 mg  25 mg oral Daily  "Wang Bernardo, APRN-CNP   25 mg at 08/09/24 1228    tamsulosin (Flomax) 24 hr capsule 0.4 mg  0.4 mg oral Daily Dhara Still APRN-CNP   0.4 mg at 08/09/24 0839    terazosin (Hytrin) capsule 2 mg  2 mg oral Nightly Dhara Still APRN-CNP   2 mg at 08/08/24 2041            ALLERGIES: Benzonatate     Review of Systems   Constitutional: Negative.  Negative for chills, diaphoresis and fatigue.   HENT: Negative.     Eyes: Negative.    Respiratory: Negative.     Cardiovascular: Negative.    Gastrointestinal: Negative.    Endocrine: Negative.    Genitourinary: Negative.    Musculoskeletal:  Positive for joint swelling.   Skin: Negative.    Allergic/Immunologic: Negative.    Neurological: Negative.    Hematological: Negative.    Psychiatric/Behavioral: Negative.              VITALS:  Blood pressure (!) 138/116, pulse 90, temperature 35.9 °C (96.6 °F), temperature source Temporal, resp. rate (!) 34, height 1.727 m (5' 7.99\"), weight 125 kg (276 lb 7.3 oz), SpO2 92%.  Body mass index is 42.05 kg/m².     Physical Exam  Constitutional:       Appearance: Normal appearance.   HENT:      Head: Normocephalic and atraumatic.      Mouth/Throat:      Mouth: Mucous membranes are moist.      Pharynx: Oropharynx is clear.   Eyes:      Extraocular Movements: Extraocular movements intact.      Conjunctiva/sclera: Conjunctivae normal.      Pupils: Pupils are equal, round, and reactive to light.   Neck:      Vascular: No carotid bruit.   Cardiovascular:      Rate and Rhythm: Normal rate and regular rhythm.   Pulmonary:      Effort: Pulmonary effort is normal.      Breath sounds: Normal breath sounds. No wheezing or rales.   Abdominal:      General: Bowel sounds are normal. There is no distension.      Tenderness: There is no abdominal tenderness. There is no guarding.   Musculoskeletal:      Cervical back: Normal range of motion and neck supple.      Comments: Left leg edema, hematoma, ecchymosis   Skin:     General: Skin is " warm.   Neurological:      General: No focal deficit present.      Mental Status: He is oriented to person, place, and time. Mental status is at baseline.   Psychiatric:         Mood and Affect: Mood normal.         Behavior: Behavior normal.            LABS:  Recent Results         Recent Results (from the past 24 hour(s))   Troponin, High Sensitivity, 1 Hour     Collection Time: 08/08/24  6:27 PM   Result Value Ref Range     Troponin I, High Sensitivity 4 0 - 20 ng/L   Comprehensive metabolic panel     Collection Time: 08/08/24  6:27 PM   Result Value Ref Range     Glucose 78 74 - 99 mg/dL     Sodium 134 (L) 136 - 145 mmol/L     Potassium 4.4 3.5 - 5.3 mmol/L     Chloride 103 98 - 107 mmol/L     Bicarbonate 22 21 - 32 mmol/L     Anion Gap 13 10 - 20 mmol/L     Urea Nitrogen 24 (H) 6 - 23 mg/dL     Creatinine 1.18 0.50 - 1.30 mg/dL     eGFR 66 >60 mL/min/1.73m*2     Calcium 8.9 8.6 - 10.3 mg/dL     Albumin 3.7 3.4 - 5.0 g/dL     Alkaline Phosphatase 68 33 - 136 U/L     Total Protein 6.4 6.4 - 8.2 g/dL     AST 12 9 - 39 U/L     Bilirubin, Total 0.7 0.0 - 1.2 mg/dL     ALT 8 (L) 10 - 52 U/L   Magnesium     Collection Time: 08/08/24  6:27 PM   Result Value Ref Range     Magnesium 1.52 (L) 1.60 - 2.40 mg/dL   Phosphorus     Collection Time: 08/08/24  6:27 PM   Result Value Ref Range     Phosphorus 3.3 2.5 - 4.9 mg/dL   CBC     Collection Time: 08/08/24  6:28 PM   Result Value Ref Range     WBC 9.9 4.4 - 11.3 x10*3/uL     nRBC 0.0 0.0 - 0.0 /100 WBCs     RBC 3.68 (L) 4.50 - 5.90 x10*6/uL     Hemoglobin 10.0 (L) 13.5 - 17.5 g/dL     Hematocrit 30.7 (L) 41.0 - 52.0 %     MCV 83 80 - 100 fL     MCH 27.2 26.0 - 34.0 pg     MCHC 32.6 32.0 - 36.0 g/dL     RDW 15.8 (H) 11.5 - 14.5 %     Platelets 232 150 - 450 x10*3/uL   POCT GLUCOSE     Collection Time: 08/08/24  8:03 PM   Result Value Ref Range     POCT Glucose 99 74 - 99 mg/dL   CBC     Collection Time: 08/09/24  3:53 AM   Result Value Ref Range     WBC 8.2 4.4 - 11.3  x10*3/uL     nRBC 0.0 0.0 - 0.0 /100 WBCs     RBC 3.82 (L) 4.50 - 5.90 x10*6/uL     Hemoglobin 10.2 (L) 13.5 - 17.5 g/dL     Hematocrit 31.7 (L) 41.0 - 52.0 %     MCV 83 80 - 100 fL     MCH 26.7 26.0 - 34.0 pg     MCHC 32.2 32.0 - 36.0 g/dL     RDW 16.0 (H) 11.5 - 14.5 %     Platelets 217 150 - 450 x10*3/uL   Comprehensive metabolic panel     Collection Time: 08/09/24  3:53 AM   Result Value Ref Range     Glucose 119 (H) 74 - 99 mg/dL     Sodium 134 (L) 136 - 145 mmol/L     Potassium 4.2 3.5 - 5.3 mmol/L     Chloride 103 98 - 107 mmol/L     Bicarbonate 25 21 - 32 mmol/L     Anion Gap 10 10 - 20 mmol/L     Urea Nitrogen 20 6 - 23 mg/dL     Creatinine 1.02 0.50 - 1.30 mg/dL     eGFR 79 >60 mL/min/1.73m*2     Calcium 9.0 8.6 - 10.3 mg/dL     Albumin 3.4 3.4 - 5.0 g/dL     Alkaline Phosphatase 73 33 - 136 U/L     Total Protein 6.6 6.4 - 8.2 g/dL     AST 10 9 - 39 U/L     Bilirubin, Total 0.6 0.0 - 1.2 mg/dL     ALT 9 (L) 10 - 52 U/L   Magnesium     Collection Time: 08/09/24  3:53 AM   Result Value Ref Range     Magnesium 1.96 1.60 - 2.40 mg/dL   Phosphorus     Collection Time: 08/09/24  3:53 AM   Result Value Ref Range     Phosphorus 3.5 2.5 - 4.9 mg/dL   POCT GLUCOSE     Collection Time: 08/09/24  6:08 AM   Result Value Ref Range     POCT Glucose 115 (H) 74 - 99 mg/dL   Transthoracic Echo (TTE) Complete     Collection Time: 08/09/24  8:31 AM   Result Value Ref Range     AV mn grad 3.0 mmHg     AV pk agnes 1.27 m/s     LV Biplane EF 53 %     LVOT diam 2.00 cm     Tricuspid annular plane systolic excursion 1.6 cm     LA vol index A/L 38.2 ml/m2     LV EF 53 %     RV free wall pk S' 12.00 cm/s     RVSP 37.1 mmHg     LVIDd 5.50 cm     Aortic Valve Area by Continuity of Peak Velocity 2.50 cm2     AV pk grad 6.5 mmHg     Aortic Valve Area by Continuity of VTI 2.87 cm2     LV A4C EF 53.0     POCT GLUCOSE     Collection Time: 08/09/24 12:20 PM   Result Value Ref Range     POCT Glucose 127 (H) 74 - 99 mg/dL   [facility] TOP      "Collection Time: 08/09/24 12:27 PM   Result Value Ref Range     Extra Tube Hold for add-ons.     Heparin Assay     Collection Time: 08/09/24 12:29 PM   Result Value Ref Range     Heparin Unfractionated 0.8 See Comment Below for Therapeutic Ranges IU/mL   ECG 12 Lead     Collection Time: 08/09/24  3:39 PM   Result Value Ref Range     Ventricular Rate 145 BPM     Atrial Rate 290 BPM     QRS Duration 80 ms     QT Interval 286 ms     QTC Calculation(Bazett) 444 ms     R Axis -27 degrees     T Axis 262 degrees     QRS Count 24 beats     Q Onset 215 ms     T Offset 358 ms     QTC Fredericia 383 ms   POCT GLUCOSE     Collection Time: 08/09/24  5:05 PM   Result Value Ref Range     POCT Glucose 146 (H) 74 - 99 mg/dL         Troponin: No results found for: \"TROPONINI\"     EKG: Atrial fibrillation        ASSESSMENT:     Paroxysmal atrial fibrillation with rapid ventricle response.-Converted to normal sinus rhythm  Patient with history of A-fib ablation x 2  Left extremity hematoma  Diabetes mellitus  Hypertension  Hyperlipidemia  Morbid obesity class III  Anemia  History of negative cardiac catheterization 2012  Normal LV function by echo 8/9/2024, ejection fraction of 50 to 55%  Renal insufficiency     PLAN:   As always, aggressive risk factor modification is strongly recommended. We should adhere to the JNC VIII guidelines for HTN management and the NCEPATP III guidelines for LDL-C management.  Continue with amiodarone 200 mg p.o. daily.  Monitor QTc.  Check EKG periodically  Continue with Lopressor as well as Cardizem for heart rate control  Hold off anticoagulation due to left extremity hematoma.  Continue to monitor on telemetry  GI/DVT prophylaxis  Maintain potassium between 4-5 and magnesium greater than 2  Avoid nephrotoxic agents.  Diuretics on hold.  Metformin on hold.  Okay to transfer to telemetry floor from cardiology standpoint  Further recommendations to follow     "

## 2024-08-11 ENCOUNTER — APPOINTMENT (OUTPATIENT)
Dept: CARDIOLOGY | Facility: HOSPITAL | Age: 69
DRG: 309 | End: 2024-08-11
Payer: MEDICARE

## 2024-08-11 VITALS
RESPIRATION RATE: 32 BRPM | SYSTOLIC BLOOD PRESSURE: 100 MMHG | HEIGHT: 68 IN | DIASTOLIC BLOOD PRESSURE: 62 MMHG | BODY MASS INDEX: 41.93 KG/M2 | OXYGEN SATURATION: 95 % | TEMPERATURE: 98.1 F | HEART RATE: 84 BPM | WEIGHT: 276.68 LBS

## 2024-08-11 LAB
ALBUMIN SERPL BCP-MCNC: 3.7 G/DL (ref 3.4–5)
ALP SERPL-CCNC: 72 U/L (ref 33–136)
ALT SERPL W P-5'-P-CCNC: 8 U/L (ref 10–52)
ANION GAP SERPL CALC-SCNC: 12 MMOL/L (ref 10–20)
AST SERPL W P-5'-P-CCNC: 9 U/L (ref 9–39)
ATRIAL RATE: 271 BPM
BILIRUB SERPL-MCNC: 0.9 MG/DL (ref 0–1.2)
BUN SERPL-MCNC: 21 MG/DL (ref 6–23)
CALCIUM SERPL-MCNC: 9.1 MG/DL (ref 8.6–10.3)
CHLORIDE SERPL-SCNC: 102 MMOL/L (ref 98–107)
CO2 SERPL-SCNC: 24 MMOL/L (ref 21–32)
CREAT SERPL-MCNC: 1.24 MG/DL (ref 0.5–1.3)
EGFRCR SERPLBLD CKD-EPI 2021: 62 ML/MIN/1.73M*2
ERYTHROCYTE [DISTWIDTH] IN BLOOD BY AUTOMATED COUNT: 15.7 % (ref 11.5–14.5)
GLUCOSE SERPL-MCNC: 139 MG/DL (ref 74–99)
HCT VFR BLD AUTO: 32.9 % (ref 41–52)
HGB BLD-MCNC: 10.7 G/DL (ref 13.5–17.5)
MAGNESIUM SERPL-MCNC: 1.81 MG/DL (ref 1.6–2.4)
MCH RBC QN AUTO: 26.8 PG (ref 26–34)
MCHC RBC AUTO-ENTMCNC: 32.5 G/DL (ref 32–36)
MCV RBC AUTO: 83 FL (ref 80–100)
NRBC BLD-RTO: 0 /100 WBCS (ref 0–0)
P AXIS: 87 DEGREES
P OFFSET: 115 MS
P ONSET: 67 MS
PHOSPHATE SERPL-MCNC: 3.9 MG/DL (ref 2.5–4.9)
PLATELET # BLD AUTO: 241 X10*3/UL (ref 150–450)
POTASSIUM SERPL-SCNC: 4.6 MMOL/L (ref 3.5–5.3)
PROT SERPL-MCNC: 7.1 G/DL (ref 6.4–8.2)
Q ONSET: 212 MS
QRS COUNT: 14 BEATS
QRS DURATION: 88 MS
QT INTERVAL: 364 MS
QTC CALCULATION(BAZETT): 417 MS
QTC FREDERICIA: 399 MS
R AXIS: -13 DEGREES
RBC # BLD AUTO: 3.99 X10*6/UL (ref 4.5–5.9)
SODIUM SERPL-SCNC: 133 MMOL/L (ref 136–145)
T AXIS: 38 DEGREES
T OFFSET: 394 MS
VENTRICULAR RATE: 79 BPM
WBC # BLD AUTO: 10.3 X10*3/UL (ref 4.4–11.3)

## 2024-08-11 PROCEDURE — 83735 ASSAY OF MAGNESIUM: CPT

## 2024-08-11 PROCEDURE — 2500000002 HC RX 250 W HCPCS SELF ADMINISTERED DRUGS (ALT 637 FOR MEDICARE OP, ALT 636 FOR OP/ED)

## 2024-08-11 PROCEDURE — 2500000001 HC RX 250 WO HCPCS SELF ADMINISTERED DRUGS (ALT 637 FOR MEDICARE OP)

## 2024-08-11 PROCEDURE — 84100 ASSAY OF PHOSPHORUS: CPT

## 2024-08-11 PROCEDURE — 93005 ELECTROCARDIOGRAM TRACING: CPT

## 2024-08-11 PROCEDURE — 80053 COMPREHEN METABOLIC PANEL: CPT

## 2024-08-11 PROCEDURE — 93010 ELECTROCARDIOGRAM REPORT: CPT | Performed by: INTERNAL MEDICINE

## 2024-08-11 PROCEDURE — 36415 COLL VENOUS BLD VENIPUNCTURE: CPT

## 2024-08-11 PROCEDURE — 99238 HOSP IP/OBS DSCHRG MGMT 30/<: CPT

## 2024-08-11 PROCEDURE — 2500000004 HC RX 250 GENERAL PHARMACY W/ HCPCS (ALT 636 FOR OP/ED)

## 2024-08-11 PROCEDURE — 85027 COMPLETE CBC AUTOMATED: CPT

## 2024-08-11 RX ORDER — AMIODARONE HYDROCHLORIDE 200 MG/1
200 TABLET ORAL DAILY
Qty: 30 TABLET | Refills: 0 | Status: SHIPPED | OUTPATIENT
Start: 2024-08-12 | End: 2024-09-11

## 2024-08-11 RX ORDER — AMIODARONE HYDROCHLORIDE 200 MG/1
200 TABLET ORAL DAILY
Qty: 30 TABLET | Refills: 0 | Status: SHIPPED | OUTPATIENT
Start: 2024-08-12 | End: 2024-08-11

## 2024-08-11 RX ORDER — MAGNESIUM SULFATE HEPTAHYDRATE 40 MG/ML
2 INJECTION, SOLUTION INTRAVENOUS ONCE
Status: COMPLETED | OUTPATIENT
Start: 2024-08-11 | End: 2024-08-11

## 2024-08-11 RX ADMIN — METOPROLOL TARTRATE 50 MG: 50 TABLET, FILM COATED ORAL at 08:00

## 2024-08-11 RX ADMIN — AMIODARONE HYDROCHLORIDE 200 MG: 200 TABLET ORAL at 08:00

## 2024-08-11 RX ADMIN — MAGNESIUM SULFATE HEPTAHYDRATE 2 G: 40 INJECTION, SOLUTION INTRAVENOUS at 07:53

## 2024-08-11 RX ADMIN — DILTIAZEM HYDROCHLORIDE 300 MG: 300 CAPSULE, COATED, EXTENDED RELEASE ORAL at 08:00

## 2024-08-11 RX ADMIN — PANTOPRAZOLE SODIUM 40 MG: 40 TABLET, DELAYED RELEASE ORAL at 07:53

## 2024-08-11 RX ADMIN — TAMSULOSIN HYDROCHLORIDE 0.4 MG: 0.4 CAPSULE ORAL at 08:00

## 2024-08-11 ASSESSMENT — PAIN - FUNCTIONAL ASSESSMENT
PAIN_FUNCTIONAL_ASSESSMENT: 0-10

## 2024-08-11 ASSESSMENT — PAIN SCALES - GENERAL
PAINLEVEL_OUTOF10: 0 - NO PAIN

## 2024-08-11 NOTE — DISCHARGE INSTRUCTIONS
Take Amiodarone 200 mg daily  Resume home Lisinopril and hydrochlorothiazide 8/12  Resume home metformin and glipizide 8/12  Continue all other home medications as usual  Call and schedule follow up with Dr. Ron for further management of Atrial Fibrillation  Return to the emergency department of symptoms return.

## 2024-08-11 NOTE — PROGRESS NOTES
[facility] Critical Care Medicine       Date:  8/11/2024  Patient:  Johnathan Akins  YOB: 1955  MRN:  [id number]   Admit Date:  8/8/2024  ========================================================================================================    Chief Complaint   Patient presents with    Leg Injury     Left leg ecchymosis from getting out of bed.  CX=[id number].          History of Present Illness:  Johnathan Akins is a 69 y.o. year old male patient with Past Medical History of paroxysmal atrial fibrillation (on Xarelto), BPH, GERD, DM2 who presented to [facility] emergency department 8/8 with complaints of left lower extremity swelling and bruising.  Per patient he was feeling at his baseline up until Sunday 8/4 when he woke up in the morning and had left lower extremity pain.  At that time he thought that he strained a muscle and did not seek treatment.  Over the next few days he noticed that leg had worsening pain, swelling, and bruising that was extending towards his groin therefore he sought medical treatment emergency department.  He denies any injury to the left lower extremity.  Does have a history of total knee arthroplasty to the left knee.  Patient denies any recent fevers or chills.  Denies any dizziness or syncope.  Denies any chest pain.  Does complain of shortness of breath on exertion and stated this started about a month ago.  Believes he has been in atrial fibrillation/atrial flutter for about 1 month, but states up to this point he has not been in A-fib for approximately 5 years.  Last dose of Xarelto was the morning of 8/8.  He follows with Dr. Montoya emergency.  Denies any nausea or vomiting.  Denies any urinary symptoms.     ED Course  -Vitals: T36.8, , /85, SpO2 90% on room air  -Labs: BBC 10.1, Hgb 10.7, , INR 2.2, Cr 1.25, BUN 28, Mg 1.59, lactate 1.4, BNP 63  -EKG: Atrial flutter with rate 140-150  -Imaging:     -CXR: Unremarkable     -X-ray left femur, knee,  tib-fib: No acute findings     -LLE venous duplex: No evidence of DVT     Patient was given a total of 40 mg diltiazem IV push and initial diltiazem infusion.  He was also given 500 mcg digoxin injection.  Heart rate remained 140 but patient was hemodynamically stable.  Patient admitted to the ICU for further care.    Interval ICU Events:  8/8: Admitted to ICU in the setting of atrial flutter.  Hemodynamically stable.  On amiodarone infusion.  Continue home metoprolol.  Noted to have LLE swelling and bruising.  Will obtain CT of left lower extremity to rule out joint effusion or vascular injury.  Hold Xarelto until active bleed is ruled out.  Consult placed to [facility] cardiology Dr. Ron.    8/9: No significant events overnight.  CT lower extremity showed medial thigh intramuscular hematoma, no signs of active extravasation.  Hemoglobin stable.  Converted to NSR. Remains on amiodarone. Will resume all home meds. Will discuss with [facility] Cardiology.    8/10: No significant events overnight.  Patient remains on amiodarone infusion, remains in atrial fibrillation but rates are controlled between 90 and 110.  Will transition to oral amiodarone 200 mg daily today.  Will resume Xarelto monitor left leg bruising/swelling.  Slight LISA today, avoid any further diuresis, hold lisinopril, will monitor renal function daily.  Further recommendations per [facility] cardiology.    8/11: No changes overnight.  Patient transition to oral amiodarone, remains in A-fib but rate is controlled.  Hemodynamically stable.  Tolerating home Xarelto.  Renal function improving.  Will continue to hold lisinopril and spironolactone today.  Will transfer to telemetry per the recommendations of [facility] cardiology, awaiting further recs on possible DCCV versus follow-up outpatient.    Medical History:  Past Medical History:   Diagnosis Date    Cardiac arrhythmia, unspecified     Irregular heartbeat    Personal history of other diseases of the respiratory  system 04/24/2019    History of acute bronchitis    Personal history of other specified conditions 02/08/2022    History of gross hematuria    Personal history of other specified conditions 02/03/2021    History of urinary retention    Personal history of other specified conditions 02/02/2021    History of urinary urgency    Personal history of urinary (tract) infections 02/03/2021    History of urinary tract infection     Past Surgical History:   Procedure Laterality Date    CYSTOSCOPY  03/23/2015    Diagnostic Cystoscopy    KNEE SURGERY  03/23/2015    Knee Surgery    OTHER SURGICAL HISTORY  03/23/2015    Complex Bladder Flow Rate Studies     Medications Prior to Admission   Medication Sig Dispense Refill Last Dose    dilTIAZem ER (Tiazac) 300 mg 24 hr capsule Take 1 capsule (300 mg) by mouth once daily.   8/7/2024    glipiZIDE XL (Glucotrol XL) 2.5 mg 24 hr tablet TAKE 1 TABLET BY MOUTH 3 TIMES  DAILY BEFORE EACH MEAL AND 1  TABLET BY MOUTH AT BEDTIME 360 tablet 3 8/7/2024    lisinopril 5 mg tablet Take 1 tablet (5 mg) by mouth once daily. 90 tablet 3 8/7/2024    metFORMIN (Glucophage) 1,000 mg tablet TAKE 1 TABLET BY MOUTH TWICE  DAILY 180 tablet 3 8/7/2024    metoprolol tartrate (Lopressor) 50 mg tablet TAKE 1 TABLET BY MOUTH TWICE  DAILY 180 tablet 3 8/7/2024    omeprazole (PriLOSEC) 40 mg DR capsule Take by mouth.   8/7/2024    rivaroxaban (Xarelto) 20 mg tablet Take 1 tablet (20 mg) by mouth once daily.   8/8/2024    simvastatin (Zocor) 20 mg tablet TAKE 1 TABLET BY MOUTH DAILY 90 tablet 3 8/7/2024    spironolactone (Aldactone) 25 mg tablet TAKE 1 TABLET BY MOUTH DAILY 90 tablet 3 8/7/2024    tamsulosin (Flomax) 0.4 mg 24 hr capsule Take 1 capsule (0.4 mg) by mouth once daily. 90 capsule 1 8/7/2024    terazosin (Hytrin) 2 mg capsule Take 1 capsule (2 mg) by mouth once daily at bedtime. 90 capsule 3 8/7/2024    trospium (Sanctura) 20 mg tablet Take 3 tablets (60 mg) by mouth once daily.   8/7/2024      Benzonatate  Social History     Tobacco Use    Smoking status: Former     Current packs/day: 0.00     Average packs/day: 1 pack/day for 25.0 years (25.0 ttl pk-yrs)     Types: Cigarettes     Start date:      Quit date:      Years since quittin.6     Passive exposure: Past    Smokeless tobacco: Never   Substance Use Topics    Alcohol use: Not Currently    Drug use: Never     Family History   Problem Relation Name Age of Onset    Prostate cancer Father      Lung cancer Brother      Esophageal cancer Brother         [facility] Medications:             Current Facility-Administered Medications:     acetaminophen (Tylenol) tablet 650 mg, 650 mg, oral, q6h PRN, LIONEL Espinosa-CNP    amiodarone (Pacerone) tablet 200 mg, 200 mg, oral, Daily, LIONEL Espinosa-CNP, 200 mg at 08/10/24 0838    dextrose 50 % injection 12.5 g, 12.5 g, intravenous, q15 min PRN, LIONEL Espinosa-CNP    dextrose 50 % injection 25 g, 25 g, intravenous, q15 min PRN, LIONEL Espinosa-MARGIE    dilTIAZem CD (Cardizem CD) 24 hr capsule 300 mg, 300 mg, oral, Daily, LIONEL Espinosa-CNP, 300 mg at 08/10/24 0838    [Held by provider] glipiZIDE XL (Glucotrol XL) 24 hr tablet 2.5 mg, 2.5 mg, oral, Daily with breakfast, AVINASH Espinosa    insulin lispro (HumaLOG) injection 0-10 Units, 0-10 Units, subcutaneous, Before meals & nightly, LIONEL Espinosa-CNP, 2 Units at 08/10/24 2103    [Held by provider] lisinopril tablet 5 mg, 5 mg, oral, Daily, LIONEL Espinosa-CNP, 5 mg at 24 1228    magnesium sulfate 2 g in sterile water for injection 50 mL, 2 g, intravenous, Once, AVINASH Mckeon    [Held by provider] metFORMIN (Glucophage) tablet 1,000 mg, 1,000 mg, oral, BID, LIONEL Espinosa-MARGIE    metoprolol tartrate (Lopressor) tablet 50 mg, 50 mg, oral, BID, Dhara Still APRN-CNP, 50 mg at 08/10/24 2103    oxybutynin (Ditropan) tablet 5 mg, 5 mg, oral, Nightly,  Wang Bernardo, APRN-CNP, 5 mg at 08/10/24 2103    pantoprazole (ProtoNix) EC tablet 40 mg, 40 mg, oral, Daily before breakfast, Wang Bernardo APRN-CNP, 40 mg at 08/10/24 0607    rivaroxaban (Xarelto) tablet 20 mg, 20 mg, oral, Daily with evening meal, Wang JimenezMIRLANDE chilelN-CNP, 20 mg at 08/10/24 1800    simvastatin (Zocor) tablet 20 mg, 20 mg, oral, Daily, LIONEL Sharif-CNP, 20 mg at 08/10/24 2103    [Held by provider] spironolactone (Aldactone) tablet 25 mg, 25 mg, oral, Daily, Wang Jimenezranjana APRN-CNP, 25 mg at 08/09/24 1228    tamsulosin (Flomax) 24 hr capsule 0.4 mg, 0.4 mg, oral, Daily, LIONEL Sharif-CNP, 0.4 mg at 08/10/24 0838    terazosin (Hytrin) capsule 2 mg, 2 mg, oral, Nightly, LIONEL Sharif-CNP, 2 mg at 08/10/24 2103    Review of Systems:  14 point review of systems was completed and negative except for those specially mention in my HPI    Physical Exam:    Heart Rate:  []   Temp:  [36.3 °C (97.3 °F)-36.8 °C (98.2 °F)]   Resp:  [20-45]   BP: ()/(47-89)   Weight:  [126 kg (276 lb 10.8 oz)]   SpO2:  [90 %-96 %]     Physical Exam  Constitutional:       General: He is not in acute distress.     Appearance: He is obese. He is not ill-appearing or toxic-appearing.   HENT:      Head: Normocephalic and atraumatic.      Nose: Nose normal.      Mouth/Throat:      Mouth: Mucous membranes are moist.      Pharynx: Oropharynx is clear.   Eyes:      General: No scleral icterus.     Extraocular Movements: Extraocular movements intact.      Pupils: Pupils are equal, round, and reactive to light.   Cardiovascular:      Rate and Rhythm: Tachycardia present. Rhythm irregular.      Pulses: Normal pulses.      Heart sounds: Normal heart sounds. No murmur heard.     No friction rub. No gallop.      Comments: Atrial fibrillation  Pulmonary:      Effort: Pulmonary effort is normal. No respiratory distress.      Breath sounds: Normal breath sounds. No wheezing or rales.    Abdominal:      General: There is no distension.      Palpations: Abdomen is soft.   Musculoskeletal:         General: Normal range of motion.      Cervical back: Normal range of motion and neck supple.      Right lower leg: Edema present.      Left lower leg: Edema present.   Skin:     General: Skin is warm and dry.      Capillary Refill: Capillary refill takes less than 2 seconds.      Comments: Significant bruising to left lower extremity from knee to groin region   Neurological:      General: No focal deficit present.      Mental Status: He is alert and oriented to person, place, and time. Mental status is at baseline.         Objective:    I have reviewed all medications, laboratory results, and imaging pertinent for today's encounter.           Intake/Output Summary (Last 24 hours) at 8/11/2024 0728  Last data filed at 8/11/2024 0600  Gross per 24 hour   Intake 76.42 ml   Output 2200 ml   Net -2123.58 ml         Assessment/Plan:    I am currently managing this critically ill patient for the following problems:    Neuro/Psych/Pain Ctrl/Sedation:  No active issues, mentation at baseline  -- Tylenol for mild pain     Respiratory/ENT:  No acute issues, on room air. Monitor spO2     Cardiovascular:  #Atrial Flutter  #History of Paroxysmal Atrial Fibrillation s/p multiple cardioversion and ablations  #History of HTN, HLD  Believes he has been in atrial flutter since Sunday 8/4, last dose of Xarelto morning of 8/8  -- Echocardiogram with normal LVEF  -- Continue home Xarelto, monitor LLE bruising/swelling  -- Continue home metoprolol and diltiazem  -- Transition to oral amiodarone 200 mg daily  -- Hold home lisinopril and spironolactone, can resume 8/12     GI:  #GERD  -- Continue home PPI  -- Cardiac Diet     Renal/Volume Status (Intra & Extravascular):  #BPH  Follows with Dr. Patten  -- Continue Oxybutynin and Tamsulosin  -- Strict intake and output  -- Daily BMP and electrolyte  repletion    Endocrine  #Non-insulin-dependent DM2  -- Hold home metformin and glipizide for now  -- Moderate SSI with meals     Infectious Disease:  No overt signs of infection, monitor off abx     Heme/Onc:  #Normocytic Anemia  Hgb from 2019 of 12.8, down to 10.7 this admission, small intramuscular hematoma within left thigh  -- Resuming Xarelto today, monitor for worsening bruising/swelling    ORTHO/MSK:  -- Apply ANNEMARIE hose to lower extremities to help with swelling  -- PT/OT     Ethics/Code Status:  Full code     :  DVT Prophylaxis: Xarelto  GI Prophylaxis: Home PPI  Bowel Regimen: As needed  Diet: Cardiac  CVC: no  Veronica: no  Louis: no  Restraints: no  Dispo: Telemetry    LIONEL Espinosa-CNP  Pulmonary & Critical Care Medicine  [facility]

## 2024-08-11 NOTE — DISCHARGE SUMMARY
Discharge Diagnosis  Atrial fibrillation/atrial flutter  Left thigh muscular hematoma  History of HTN, HLD  History of BPH  History of GERD  Non-insulin-dependent DM2  Normocytic anemia    Issues Requiring Follow-Up  Take Amiodarone 200 mg daily  Resume home Lisinopril and hydrochlorothiazide 8/12  Resume home metformin and glipizide 8/12  Continue all other home medications as usual  Call and schedule follow up with Dr. Ron for further management of Atrial Fibrillation  Return to the emergency department of symptoms return.        Test Results Pending At Discharge  Pending Labs       No current pending labs.            Hospital Course  Johnathan Akins is a 69 y.o. year old male patient with Past Medical History of paroxysmal atrial fibrillation (on Xarelto), BPH, GERD, DM2 who presented to [facility] emergency department 8/8 with complaints of left lower extremity swelling and bruising.  Per patient he was feeling at his baseline up until Sunday 8/4 when he woke up in the morning and had left lower extremity pain.  At that time he thought that he strained a muscle and did not seek treatment.  Over the next few days he noticed that leg had worsening pain, swelling, and bruising that was extending towards his groin therefore he sought medical treatment emergency department.  He denies any injury to the left lower extremity.  Does have a history of total knee arthroplasty to the left knee.  Patient denies any recent fevers or chills.  Denies any dizziness or syncope.  Denies any chest pain.  Does complain of shortness of breath on exertion and stated this started about a month ago.  Believes he has been in atrial fibrillation/atrial flutter for about 1 month, but states up to this point he has not been in A-fib for approximately 5 years.  Last dose of Xarelto was the morning of 8/8.  He follows with Dr. Montoya emergency.  Denies any nausea or vomiting.  Denies any urinary symptoms.     ED Course  -Vitals:  T36.8, , /85, SpO2 90% on room air  -Labs: BBC 10.1, Hgb 10.7, , INR 2.2, Cr 1.25, BUN 28, Mg 1.59, lactate 1.4, BNP 63  -EKG: Atrial flutter with rate 140-150  -Imaging:     -CXR: Unremarkable     -X-ray left femur, knee, tib-fib: No acute findings     -LLE venous duplex: No evidence of DVT     Patient was given a total of 40 mg diltiazem IV push and initial diltiazem infusion.  He was also given 500 mcg digoxin injection.  Heart rate remained 140 but patient was hemodynamically stable.  Patient admitted to the ICU for further care.     Interval ICU Events:  8/8: Admitted to ICU in the setting of atrial flutter.  Hemodynamically stable.  On amiodarone infusion.  Continue home metoprolol.  Noted to have LLE swelling and bruising.  Will obtain CT of left lower extremity to rule out joint effusion or vascular injury.  Hold Xarelto until active bleed is ruled out.  Consult placed to [facility] cardiology Dr. Ron.     8/9: No significant events overnight.  CT lower extremity showed medial thigh intramuscular hematoma, no signs of active extravasation.  Hemoglobin stable.  Converted to NSR. Remains on amiodarone. Will resume all home meds. Will discuss with [facility] Cardiology.     8/10: No significant events overnight.  Patient remains on amiodarone infusion, remains in atrial fibrillation but rates are controlled between 90 and 110.  Will transition to oral amiodarone 200 mg daily today.  Will resume Xarelto monitor left leg bruising/swelling.  Slight LISA today, avoid any further diuresis, hold lisinopril, will monitor renal function daily.  [facility].     8/11: No changes overnight.  Patient transition to oral amiodarone he remains in atrial fibrillation but rates are well-controlled.  He is hemodynamically stable.  He is tolerating his home Xarelto with no signs of further bleeding/bruising to left thigh.  His renal function continues to improve.    Patient will be  discharged home today and will have follow-up scheduled with Dr. Ron for further management of his atrial fibrillation.  He is okay to resume his home lisinopril and hydrochlorothiazide tomorrow 8/12.  He can do his home metformin and glipizide tomorrow 8/12.  He should continue to take all medications as prescribed.  Given a prescription for amiodarone 200 mg daily.       Pertinent Physical Exam At Time of Discharge  Constitutional:       General: He is not in acute distress.     Appearance: He is obese. He is not ill-appearing or toxic-appearing.   HENT:      Head: Normocephalic and atraumatic.      Nose: Nose normal.      Mouth/Throat:      Mouth: Mucous membranes are moist.      Pharynx: Oropharynx is clear.   Eyes:      General: No scleral icterus.     Extraocular Movements: Extraocular movements intact.      Pupils: Pupils are equal, round, and reactive to light.   Cardiovascular:      Rate and Rhythm: Tachycardia present. Rhythm irregular.      Pulses: Normal pulses.      Heart sounds: Normal heart sounds. No murmur heard.     No friction rub. No gallop.      Comments: Atrial fibrillation  Pulmonary:      Effort: Pulmonary effort is normal. No respiratory distress.      Breath sounds: Normal breath sounds. No wheezing or rales.   Abdominal:      General: There is no distension.      Palpations: Abdomen is soft.   Musculoskeletal:         General: Normal range of motion.      Cervical back: Normal range of motion and neck supple.      Right lower leg: Edema present.      Left lower leg: Edema present.   Skin:     General: Skin is warm and dry.      Capillary Refill: Capillary refill takes less than 2 seconds.      Comments: Significant bruising to left lower extremity from knee to groin region   Neurological:      General: No focal deficit present.      Mental Status: He is alert and oriented to person, place, and time. Mental status is at baseline.     Home Medications     Medication List      START  taking these medications     amiodarone 200 mg tablet; Commonly known as: Pacerone; Take 1 tablet   (200 mg) by mouth once daily. Do not fill before August 12, 2024.; Start   taking on: August 12, 2024     CONTINUE taking these medications     dilTIAZem  mg 24 hr capsule; Commonly known as: Tiazac   glipiZIDE XL 2.5 mg 24 hr tablet; Commonly known as: Glucotrol XL; TAKE   1 TABLET BY MOUTH 3 TIMES  DAILY BEFORE EACH MEAL AND 1  TABLET BY MOUTH   AT BEDTIME   lisinopril 5 mg tablet; Take 1 tablet (5 mg) by mouth once daily.   metFORMIN 1,000 mg tablet; Commonly known as: Glucophage; TAKE 1 TABLET   BY MOUTH TWICE  DAILY   metoprolol tartrate 50 mg tablet; Commonly known as: Lopressor; TAKE 1   TABLET BY MOUTH TWICE  DAILY   omeprazole 40 mg DR capsule; Commonly known as: PriLOSEC   oxybutynin 5 mg tablet; Commonly known as: Ditropan; Take 1 tablet (5   mg) by mouth once daily at bedtime.   rivaroxaban 20 mg tablet; Commonly known as: Xarelto   simvastatin 20 mg tablet; Commonly known as: Zocor; TAKE 1 TABLET BY   MOUTH DAILY   spironolactone 25 mg tablet; Commonly known as: Aldactone; TAKE 1 TABLET   BY MOUTH DAILY   tamsulosin 0.4 mg 24 hr capsule; Commonly known as: Flomax; Take 1   capsule (0.4 mg) by mouth once daily.   terazosin 2 mg capsule; Commonly known as: Hytrin; Take 1 capsule (2 mg)   by mouth once daily at bedtime.   trospium 20 mg tablet; Commonly known as: Sanctura       Outpatient Follow-Up  Future Appointments   Date Time Provider Department Center   9/4/2024  2:00 PM Livan Bartholomew DO [id number] [place]   2/24/2025  3:30 PM Leo Patten MD [id number] [place]       LIONEL Espinosa-CNP  Pulmonary & Critical Care Medicine  [facility]

## 2024-08-12 ENCOUNTER — PATIENT OUTREACH (OUTPATIENT)
Dept: PRIMARY CARE | Facility: CLINIC | Age: 69
End: 2024-08-12
Payer: MEDICARE

## 2024-08-12 LAB
ATRIAL RATE: 271 BPM
P AXIS: 87 DEGREES
P OFFSET: 115 MS
P ONSET: 67 MS
Q ONSET: 212 MS
QRS COUNT: 14 BEATS
QRS DURATION: 88 MS
QT INTERVAL: 364 MS
QTC CALCULATION(BAZETT): 417 MS
QTC FREDERICIA: 399 MS
R AXIS: -13 DEGREES
T AXIS: 38 DEGREES
T OFFSET: 394 MS
VENTRICULAR RATE: 79 BPM

## 2024-08-12 NOTE — PROGRESS NOTES
Discharge Facility: [facility]  Discharge Diagnosis:  Atrial fibrillation/atrial flutter  Left thigh muscular hematoma  History of HTN, HLD  History of BPH  History of GERD  Non-insulin-dependent DM2  Normocytic anemia  Admission Date: 8/8/24  Discharge Date:  8/11/24    PCP Appointment Date: TBD - task to office  Specialist Appointment Date:   - Benito Ron DO (Cardiology) in 1 week   Hospital Encounter and Summary Linked: Yes  See discharge assessment below for further details    Medications  Medications reviewed with patient/caregiver?: Yes (new/changes only) (8/12/2024 12:35 PM)  Is the patient having any side effects they believe may be caused by any medication additions or changes?: No (8/12/2024 12:35 PM)  Does the patient have all medications ordered at discharge?: Yes (8/12/2024 12:35 PM)  Prescription Comments: START: amiodarone 200mg daily (8/12/2024 12:35 PM)  Is the patient taking all medications as directed (includes completed medication regime)?: Yes (8/12/2024 12:35 PM)  Care Management Interventions: Provided patient education (8/12/2024 12:35 PM)    Appointments  Does the patient have a primary care provider?: Yes (8/12/2024 12:35 PM)  Care Management Interventions: Verified appointment date/time/provider (8/12/2024 12:35 PM)  Has the patient kept scheduled appointments due by today?: Yes (8/12/2024 12:35 PM)  Care Management Interventions: Advised patient to keep appointment (8/12/2024 12:35 PM)    Self Management  Has home health visited the patient within 72 hours of discharge?: Not applicable (8/12/2024 12:35 PM)  What Durable Medical Equipment (DME) was ordered?: n/a (8/12/2024 12:35 PM)    Patient Teaching  Does the patient have access to their discharge instructions?: Yes (8/12/2024 12:35 PM)  Care Management Interventions: Reviewed instructions with patient (8/12/2024 12:35 PM)  What is the patient's perception of their health status since discharge?: Improving (8/12/2024 12:35 PM)  Is the  patient/caregiver able to teach back the hierarchy of who to call/visit for symptoms/problems? PCP, Specialist, Home Health nurse, Urgent Care, ED, 911: Yes (8/12/2024 12:35 PM)  Patient/Caregiver Education Comments: Patient reports he is feeling better; will  script for amiodaraone today. Denies questions/concerns at this time. (8/12/2024 12:35 PM)

## 2024-08-16 DIAGNOSIS — N18.30 CONTROLLED TYPE 2 DIABETES MELLITUS WITH STAGE 3 CHRONIC KIDNEY DISEASE, WITHOUT LONG-TERM CURRENT USE OF INSULIN (MULTI): Primary | ICD-10-CM

## 2024-08-16 DIAGNOSIS — E11.22 CONTROLLED TYPE 2 DIABETES MELLITUS WITH STAGE 3 CHRONIC KIDNEY DISEASE, WITHOUT LONG-TERM CURRENT USE OF INSULIN (MULTI): Primary | ICD-10-CM

## 2024-08-17 ENCOUNTER — APPOINTMENT (OUTPATIENT)
Dept: RADIOLOGY | Facility: HOSPITAL | Age: 69
End: 2024-08-17
Payer: MEDICARE

## 2024-08-17 ENCOUNTER — HOSPITAL ENCOUNTER (EMERGENCY)
Facility: HOSPITAL | Age: 69
Discharge: HOME | End: 2024-08-18
Attending: EMERGENCY MEDICINE
Payer: MEDICARE

## 2024-08-17 ENCOUNTER — APPOINTMENT (OUTPATIENT)
Dept: CARDIOLOGY | Facility: HOSPITAL | Age: 69
End: 2024-08-17
Payer: MEDICARE

## 2024-08-17 DIAGNOSIS — I95.9 HYPOTENSION, UNSPECIFIED HYPOTENSION TYPE: ICD-10-CM

## 2024-08-17 DIAGNOSIS — I48.92 ATRIAL FIB/FLUTTER, TRANSIENT (MULTI): Primary | ICD-10-CM

## 2024-08-17 DIAGNOSIS — I48.91 ATRIAL FIB/FLUTTER, TRANSIENT (MULTI): Primary | ICD-10-CM

## 2024-08-17 LAB
ALBUMIN SERPL BCP-MCNC: 3.6 G/DL (ref 3.4–5)
ALP SERPL-CCNC: 81 U/L (ref 33–136)
ALT SERPL W P-5'-P-CCNC: 13 U/L (ref 10–52)
ANION GAP SERPL CALC-SCNC: 14 MMOL/L (ref 10–20)
APPEARANCE UR: CLEAR
AST SERPL W P-5'-P-CCNC: 13 U/L (ref 9–39)
BASOPHILS # BLD AUTO: 0.03 X10*3/UL (ref 0–0.1)
BASOPHILS NFR BLD AUTO: 0.4 %
BILIRUB SERPL-MCNC: 0.4 MG/DL (ref 0–1.2)
BILIRUB UR STRIP.AUTO-MCNC: NEGATIVE MG/DL
BNP SERPL-MCNC: 31 PG/ML (ref 0–99)
BUN SERPL-MCNC: 25 MG/DL (ref 6–23)
CALCIUM SERPL-MCNC: 8.9 MG/DL (ref 8.6–10.3)
CARDIAC TROPONIN I PNL SERPL HS: 5 NG/L (ref 0–20)
CHLORIDE SERPL-SCNC: 103 MMOL/L (ref 98–107)
CO2 SERPL-SCNC: 21 MMOL/L (ref 21–32)
COLOR UR: ABNORMAL
CREAT SERPL-MCNC: 1.44 MG/DL (ref 0.5–1.3)
EGFRCR SERPLBLD CKD-EPI 2021: 52 ML/MIN/1.73M*2
EOSINOPHIL # BLD AUTO: 0.23 X10*3/UL (ref 0–0.4)
EOSINOPHIL NFR BLD AUTO: 3.2 %
ERYTHROCYTE [DISTWIDTH] IN BLOOD BY AUTOMATED COUNT: 16.3 % (ref 11.5–14.5)
GLUCOSE SERPL-MCNC: 139 MG/DL (ref 74–99)
GLUCOSE UR STRIP.AUTO-MCNC: NORMAL MG/DL
HCT VFR BLD AUTO: 32.1 % (ref 41–52)
HGB BLD-MCNC: 10.3 G/DL (ref 13.5–17.5)
IMM GRANULOCYTES # BLD AUTO: 0.02 X10*3/UL (ref 0–0.5)
IMM GRANULOCYTES NFR BLD AUTO: 0.3 % (ref 0–0.9)
INR PPP: 2.1 (ref 0.9–1.1)
KETONES UR STRIP.AUTO-MCNC: NEGATIVE MG/DL
LACTATE SERPL-SCNC: 2.2 MMOL/L (ref 0.4–2)
LEUKOCYTE ESTERASE UR QL STRIP.AUTO: NEGATIVE
LYMPHOCYTES # BLD AUTO: 1.12 X10*3/UL (ref 0.8–3)
LYMPHOCYTES NFR BLD AUTO: 15.5 %
MAGNESIUM SERPL-MCNC: 1.74 MG/DL (ref 1.6–2.4)
MCH RBC QN AUTO: 27.2 PG (ref 26–34)
MCHC RBC AUTO-ENTMCNC: 32.1 G/DL (ref 32–36)
MCV RBC AUTO: 85 FL (ref 80–100)
MONOCYTES # BLD AUTO: 0.89 X10*3/UL (ref 0.05–0.8)
MONOCYTES NFR BLD AUTO: 12.3 %
NEUTROPHILS # BLD AUTO: 4.95 X10*3/UL (ref 1.6–5.5)
NEUTROPHILS NFR BLD AUTO: 68.3 %
NITRITE UR QL STRIP.AUTO: NEGATIVE
NRBC BLD-RTO: 0 /100 WBCS (ref 0–0)
PH UR STRIP.AUTO: 5.5 [PH]
PLATELET # BLD AUTO: 250 X10*3/UL (ref 150–450)
POTASSIUM SERPL-SCNC: 4.7 MMOL/L (ref 3.5–5.3)
PROT SERPL-MCNC: 7 G/DL (ref 6.4–8.2)
PROT UR STRIP.AUTO-MCNC: NEGATIVE MG/DL
PROTHROMBIN TIME: 23.4 SECONDS (ref 9.8–12.8)
RBC # BLD AUTO: 3.79 X10*6/UL (ref 4.5–5.9)
RBC # UR STRIP.AUTO: NEGATIVE /UL
SODIUM SERPL-SCNC: 133 MMOL/L (ref 136–145)
SP GR UR STRIP.AUTO: 1.02
UROBILINOGEN UR STRIP.AUTO-MCNC: ABNORMAL MG/DL
WBC # BLD AUTO: 7.2 X10*3/UL (ref 4.4–11.3)

## 2024-08-17 PROCEDURE — 93005 ELECTROCARDIOGRAM TRACING: CPT

## 2024-08-17 PROCEDURE — 84484 ASSAY OF TROPONIN QUANT: CPT | Performed by: EMERGENCY MEDICINE

## 2024-08-17 PROCEDURE — 83605 ASSAY OF LACTIC ACID: CPT | Performed by: EMERGENCY MEDICINE

## 2024-08-17 PROCEDURE — 71045 X-RAY EXAM CHEST 1 VIEW: CPT

## 2024-08-17 PROCEDURE — 99283 EMERGENCY DEPT VISIT LOW MDM: CPT | Mod: 25

## 2024-08-17 PROCEDURE — 96360 HYDRATION IV INFUSION INIT: CPT

## 2024-08-17 PROCEDURE — 85610 PROTHROMBIN TIME: CPT | Performed by: EMERGENCY MEDICINE

## 2024-08-17 PROCEDURE — 83735 ASSAY OF MAGNESIUM: CPT | Performed by: EMERGENCY MEDICINE

## 2024-08-17 PROCEDURE — 71045 X-RAY EXAM CHEST 1 VIEW: CPT | Mod: FOREIGN READ | Performed by: RADIOLOGY

## 2024-08-17 PROCEDURE — 96361 HYDRATE IV INFUSION ADD-ON: CPT

## 2024-08-17 PROCEDURE — 2500000004 HC RX 250 GENERAL PHARMACY W/ HCPCS (ALT 636 FOR OP/ED): Performed by: EMERGENCY MEDICINE

## 2024-08-17 PROCEDURE — 81003 URINALYSIS AUTO W/O SCOPE: CPT | Performed by: EMERGENCY MEDICINE

## 2024-08-17 PROCEDURE — 85025 COMPLETE CBC W/AUTO DIFF WBC: CPT | Performed by: EMERGENCY MEDICINE

## 2024-08-17 PROCEDURE — 83880 ASSAY OF NATRIURETIC PEPTIDE: CPT | Performed by: EMERGENCY MEDICINE

## 2024-08-17 PROCEDURE — 80053 COMPREHEN METABOLIC PANEL: CPT | Performed by: EMERGENCY MEDICINE

## 2024-08-17 ASSESSMENT — PAIN - FUNCTIONAL ASSESSMENT: PAIN_FUNCTIONAL_ASSESSMENT: 0-10

## 2024-08-17 ASSESSMENT — PAIN SCALES - GENERAL: PAINLEVEL_OUTOF10: 0 - NO PAIN

## 2024-08-17 ASSESSMENT — COLUMBIA-SUICIDE SEVERITY RATING SCALE - C-SSRS
1. IN THE PAST MONTH, HAVE YOU WISHED YOU WERE DEAD OR WISHED YOU COULD GO TO SLEEP AND NOT WAKE UP?: NO
2. HAVE YOU ACTUALLY HAD ANY THOUGHTS OF KILLING YOURSELF?: NO
6. HAVE YOU EVER DONE ANYTHING, STARTED TO DO ANYTHING, OR PREPARED TO DO ANYTHING TO END YOUR LIFE?: NO

## 2024-08-17 ASSESSMENT — LIFESTYLE VARIABLES
TOTAL SCORE: 0
EVER FELT BAD OR GUILTY ABOUT YOUR DRINKING: NO
HAVE PEOPLE ANNOYED YOU BY CRITICIZING YOUR DRINKING: NO
HAVE YOU EVER FELT YOU SHOULD CUT DOWN ON YOUR DRINKING: NO
EVER HAD A DRINK FIRST THING IN THE MORNING TO STEADY YOUR NERVES TO GET RID OF A HANGOVER: NO

## 2024-08-18 VITALS
HEART RATE: 84 BPM | RESPIRATION RATE: 18 BRPM | HEIGHT: 68 IN | TEMPERATURE: 97 F | DIASTOLIC BLOOD PRESSURE: 69 MMHG | BODY MASS INDEX: 39.4 KG/M2 | WEIGHT: 260 LBS | OXYGEN SATURATION: 100 % | SYSTOLIC BLOOD PRESSURE: 109 MMHG

## 2024-08-18 LAB
ATRIAL RATE: 288 BPM
ATRIAL RATE: 288 BPM
CARDIAC TROPONIN I PNL SERPL HS: 4 NG/L (ref 0–20)
LACTATE SERPL-SCNC: 1.5 MMOL/L (ref 0.4–2)
P OFFSET: 192 MS
P ONSET: 172 MS
Q ONSET: 212 MS
Q ONSET: 213 MS
QRS COUNT: 11 BEATS
QRS COUNT: 13 BEATS
QRS DURATION: 84 MS
QRS DURATION: 86 MS
QT INTERVAL: 366 MS
QT INTERVAL: 380 MS
QTC CALCULATION(BAZETT): 401 MS
QTC CALCULATION(BAZETT): 414 MS
QTC FREDERICIA: 394 MS
QTC FREDERICIA: 397 MS
R AXIS: -3 DEGREES
R AXIS: 2 DEGREES
T AXIS: 21 DEGREES
T AXIS: 21 DEGREES
T OFFSET: 395 MS
T OFFSET: 403 MS
VENTRICULAR RATE: 67 BPM
VENTRICULAR RATE: 77 BPM

## 2024-08-18 PROCEDURE — 83605 ASSAY OF LACTIC ACID: CPT | Performed by: EMERGENCY MEDICINE

## 2024-08-18 PROCEDURE — 84484 ASSAY OF TROPONIN QUANT: CPT | Performed by: EMERGENCY MEDICINE

## 2024-08-18 NOTE — PROGRESS NOTES
This patient was signed out to me by colleague.  Patient was pending repeat troponin and EKG. Repeat troponin was stable and he was reexamined and is currently asymptomatic.  I did recommend admission for observation but the patient declined and preferred to go home.  I believe his capacity make this decision so 1 respected cystotomy.  I recommend he continue to hydrate himself given he has a mild LISA and is symptoms could be due to some dehydration.  Patient plans to follow-up with his regular doctor have repeat blood work and further evaluation.  I told him of low threshold to return.  The work-up and plan were discussed with the patient/caregiver and questions were answered regarding the ED visit.  Educational materials were provided as well as return precautions including returning for any persisting or worsening symptoms.  Patient was recommended to follow-up with PCP in the next few days in addition to any potential specialists that were discussed.  The patient/family expressed understanding and agreed to the described plan.  Patient was discharged in stable condition.      EKG 2326  Per my interpretation:  Electrocardiogram ECG  RATE:  [Normal]  RHYTHM: Atrial fibrillation  AXIS: [Normal]  INTERVALS: [Normal]  ST-T WAVE CHANGES: [Normal]  ABNORMALITIES/COMPARISON: Unchanged from earlier today.

## 2024-08-18 NOTE — ED PROVIDER NOTES
"HPI   Chief Complaint   Patient presents with    Hypotension     Patient states that he was discharged from the hospitals on Sunday and sent home on amiodarone. Patient has been having low BP. Patient denies CP, SOB, HA. Patient states that he does get dizzy when stands up sometimes.        HPI: 69-year-old male history of A-fib on Xarelto discharged on the eighth after he was in A-fib with RVR.  He was started on amiodarone at that time.  He did have an an echo which shows an EF of 50 to 55%.  Patient states that yesterday he had an episode where he got dizzy getting up and fell.  His wife was concerned because his blood pressure was low.  She states that it was 90s over 50s.  Patient is denying any pain.  He states that he did not have any dizziness today.  The patient's wife wanted him to get \"checked out because she talked to their daughter who is a nurse and they recommended that he come to the hospital.  Patient is denying any chest pain.  No shortness of breath.  No abdominal pain.  No numbness tingling or weakness.  Family HX: Denies any significant/pertinent family history.  Social Hx: Denies ETOH or drug use.  Review of systems:  Gen.: No weight loss, fatigue, anorexia, insomnia, fever.   Eyes: No vision loss, double vision, drainage, eye pain.   ENT: No pharyngitis, dry mouth.   Cardiac: No chest pain, palpitations, syncope, near syncope.   Pulmonary: No shortness of breath, cough, hemoptysis.   Heme/lymph: No swollen glands, fever, bleeding.   GI: No abdominal pain, change in bowel habits, melena, hematemesis, hematochezia, nausea, vomiting, diarrhea.   : No discharge, dysuria, frequency, urgency, hematuria.   Musculoskeletal: No limb pain, joint pain, joint swelling.   Skin: No rashes.   Psych: No depression, anxiety, suicidality, homicidality.   Review of systems is otherwise negative unless stated above or in history of present illness.    Physical Exam:    Appearance: Alert, oriented , cooperative, "  in no acute distress. Well nourished & well hydrated.    Skin: Intact,  dry skin, no lesions, rash, petechiae or purpura.     Eyes: PERRLA, EOMs intact,  Conjunctiva pink with no redness or exudates. Eyelids without lesions. No scleral icterus.     ENT: Hearing grossly intact. External auditory canals patent, tympanic membranes intact with visible landmarks. Nares patent, mucus membranes moist. Dentition without lesions. Pharynx clear, uvula midline.     Neck: Supple, without meningismus. Thyroid not palpable. Trachea at midline. No lymphadenopathy.    Pulmonary: Clear bilaterally with good chest wall excursion. No rales, rhonchi or wheezing. No accessory muscle use or stridor.    Cardiac: Irregular without murmur, rub, gallop or extrasystole. No JVD, Carotids without bruits.    Abdomen: Soft, nontender, active bowel sounds.  No palpable organomegaly.  No rebound or guarding.  No CVA tenderness.  Given his nontender    Genitourinary: Exam deferred.    Musculoskeletal: Full range of motion. no pain, edema, or deformity. Pulses full and equal. No cyanosis, clubbing, or edema.    Neurological:  Cranial nerves II through XII are grossly intact, finger-nose touch is normal, normal sensation, no weakness, no focal findings identified.  Night stroke scale is 0.  Patient has a narrow-based steady gait and a normal test of skew    Psychiatric: Appropriate mood and affect.     Medical Decision-Making:  Testing: EKG was obtained which is interpreted by me shows A-fib rate of 77 without evidence of obvious ST elevations or T wave inversions to indicate acute ischemia or infarct.  Labs reveal an initial troponin that is negative.  Lactate is elevated at 2.2.  Magnesium is 1.7.  BNP is 31.  Urinalysis essentially unremarkable.  Sodium is mildly low at 133.  Creatinine is 1.44 with a BUN of 25 and a GFR of 52.  Given these findings and the potential low blood pressures we did hydrate the patient.  We will recheck blood pressures  and orthostatics after IV fluids.  I did not see CHF on any recent diagnoses or echocardiogram therefore I believe that the patient could have the fluids.  Treatment:   Reevaluation:   Plan we will get the second troponin.  Patient is getting IV fluids.  We will really evaluate after that.  Impression:   1.  A-fib  2.                  Patient History   Past Medical History:   Diagnosis Date    Cardiac arrhythmia, unspecified     Irregular heartbeat    Personal history of other diseases of the respiratory system 2019    History of acute bronchitis    Personal history of other specified conditions 2022    History of gross hematuria    Personal history of other specified conditions 2021    History of urinary retention    Personal history of other specified conditions 2021    History of urinary urgency    Personal history of urinary (tract) infections 2021    History of urinary tract infection     Past Surgical History:   Procedure Laterality Date    CYSTOSCOPY  2015    Diagnostic Cystoscopy    KNEE SURGERY  2015    Knee Surgery    OTHER SURGICAL HISTORY  2015    Complex Bladder Flow Rate Studies     Family History   Problem Relation Name Age of Onset    Prostate cancer Father      Lung cancer Brother      Esophageal cancer Brother       Social History     Tobacco Use    Smoking status: Former     Current packs/day: 0.00     Average packs/day: 1 pack/day for 25.0 years (25.0 ttl pk-yrs)     Types: Cigarettes     Start date:      Quit date:      Years since quittin.6     Passive exposure: Past    Smokeless tobacco: Never   Substance Use Topics    Alcohol use: Not Currently    Drug use: Never       Physical Exam   ED Triage Vitals [24]   Temperature Heart Rate Respirations BP   36.1 °C (97 °F) 90 18 115/64      Pulse Ox Temp Source Heart Rate Source Patient Position   96 % Temporal Monitor Sitting      BP Location FiO2 (%)     -- --       Physical  Exam      ED Course & MDM   Diagnoses as of 08/17/24 2255   Atrial fib/flutter, transient (Multi)                 No data recorded     Gio Coma Scale Score: 15 (08/17/24 2055 : Dee Sanders RN)                           Medical Decision Making      Procedure  Procedures     Chloe Lozada MD  08/17/24 2489

## 2024-08-19 ENCOUNTER — APPOINTMENT (OUTPATIENT)
Dept: PRIMARY CARE | Facility: CLINIC | Age: 69
End: 2024-08-19
Payer: MEDICARE

## 2024-08-28 ENCOUNTER — PATIENT OUTREACH (OUTPATIENT)
Dept: PRIMARY CARE | Facility: CLINIC | Age: 69
End: 2024-08-28
Payer: MEDICARE

## 2024-09-02 DIAGNOSIS — I10 PRIMARY HYPERTENSION: Primary | ICD-10-CM

## 2024-09-03 RX ORDER — LISINOPRIL 5 MG/1
5 TABLET ORAL DAILY
Qty: 90 TABLET | Refills: 1 | Status: SHIPPED | OUTPATIENT
Start: 2024-09-03

## 2024-09-04 ENCOUNTER — APPOINTMENT (OUTPATIENT)
Dept: PRIMARY CARE | Facility: CLINIC | Age: 69
End: 2024-09-04
Payer: MEDICARE

## 2024-09-04 VITALS
TEMPERATURE: 96.9 F | WEIGHT: 260 LBS | BODY MASS INDEX: 39.4 KG/M2 | OXYGEN SATURATION: 98 % | HEIGHT: 68 IN | HEART RATE: 68 BPM | RESPIRATION RATE: 18 BRPM | SYSTOLIC BLOOD PRESSURE: 118 MMHG | DIASTOLIC BLOOD PRESSURE: 76 MMHG

## 2024-09-04 DIAGNOSIS — Z00.00 ROUTINE GENERAL MEDICAL EXAMINATION AT HEALTH CARE FACILITY: ICD-10-CM

## 2024-09-04 DIAGNOSIS — I42.8 CARDIOMYOPATHY, NONISCHEMIC (MULTI): ICD-10-CM

## 2024-09-04 DIAGNOSIS — E11.22 CONTROLLED TYPE 2 DIABETES MELLITUS WITH STAGE 3 CHRONIC KIDNEY DISEASE, WITHOUT LONG-TERM CURRENT USE OF INSULIN (MULTI): ICD-10-CM

## 2024-09-04 DIAGNOSIS — E66.01 OBESITY, MORBID (MULTI): ICD-10-CM

## 2024-09-04 DIAGNOSIS — E11.9 TYPE 2 DIABETES MELLITUS WITHOUT COMPLICATION, WITHOUT LONG-TERM CURRENT USE OF INSULIN (MULTI): ICD-10-CM

## 2024-09-04 DIAGNOSIS — N18.30 CONTROLLED TYPE 2 DIABETES MELLITUS WITH STAGE 3 CHRONIC KIDNEY DISEASE, WITHOUT LONG-TERM CURRENT USE OF INSULIN (MULTI): ICD-10-CM

## 2024-09-04 DIAGNOSIS — K21.9 GASTROESOPHAGEAL REFLUX DISEASE, UNSPECIFIED WHETHER ESOPHAGITIS PRESENT: ICD-10-CM

## 2024-09-04 DIAGNOSIS — I48.0 PAROXYSMAL ATRIAL FIBRILLATION WITH RVR (MULTI): ICD-10-CM

## 2024-09-04 DIAGNOSIS — Z00.00 MEDICARE ANNUAL WELLNESS VISIT, SUBSEQUENT: Primary | ICD-10-CM

## 2024-09-04 RX ORDER — OMEPRAZOLE 40 MG/1
40 CAPSULE, DELAYED RELEASE ORAL
Qty: 90 CAPSULE | Refills: 3 | Status: SHIPPED | OUTPATIENT
Start: 2024-09-04

## 2024-09-04 ASSESSMENT — ENCOUNTER SYMPTOMS
OCCASIONAL FEELINGS OF UNSTEADINESS: 0
DEPRESSION: 0
LOSS OF SENSATION IN FEET: 0

## 2024-09-04 ASSESSMENT — ACTIVITIES OF DAILY LIVING (ADL)
GROCERY_SHOPPING: INDEPENDENT
DRESSING: INDEPENDENT
DOING_HOUSEWORK: INDEPENDENT
BATHING: INDEPENDENT
MANAGING_FINANCES: INDEPENDENT
TAKING_MEDICATION: INDEPENDENT

## 2024-09-04 ASSESSMENT — PATIENT HEALTH QUESTIONNAIRE - PHQ9
SUM OF ALL RESPONSES TO PHQ9 QUESTIONS 1 AND 2: 0
2. FEELING DOWN, DEPRESSED OR HOPELESS: NOT AT ALL
1. LITTLE INTEREST OR PLEASURE IN DOING THINGS: NOT AT ALL

## 2024-09-04 NOTE — PROGRESS NOTES
Subjective   Reason for Visit: Johnathan Akins is an 69 y.o. male here for a Medicare Wellness visit.          Reviewed all medications by prescribing practitioner or clinical pharmacist (such as prescriptions, OTCs, herbal therapies and supplements) and documented in the medical record.    HPI  69-year-old and here for Medicare wellness review we did review his recent hospitalization for atrial fibs and long story after reviewing Dr. Pearson who is his cardiologist note he is back in regular sinus rhythm after being on amiodarone however recently had to go back to the emergency room because of hypotension and stopped his lisinopril.  I did review in detail and check his blood pressure twice a day and if his systolic is less than 110 to hold his metoprolol but continue his 300 mg diltiazem at nighttime remains on amiodarone as well as Xarelto metformin and glipizide for diabetes Zocor at nighttime and Aldactone twice a day.  Take Hytrin also at nighttime for his prostate interaction Dr. Patten needs to get his PSA rechecked which was ordered earlier by Dr. Patten otherwise no chest pain today minimal peripheral swelling but otherwise blood pressure today is improved.  Patient Care Team:  Livan Bartholomew DO as PCP - General  Livan Bartholomew DO as PCP - [facility] ACO Attributed Provider  Desirae Lee LPN as Care Manager (Case Management)     Review of Systems   Constitutional:  Negative for fatigue and fever.   HENT:  Negative for rhinorrhea, sinus pressure and sinus pain.    Eyes:  Negative for visual disturbance.   Respiratory:  Positive for shortness of breath. Negative for cough, chest tightness and wheezing.    Cardiovascular:  Positive for leg swelling. Negative for chest pain and palpitations.   Gastrointestinal:  Negative for abdominal pain, blood in stool and vomiting.   Genitourinary:  Positive for frequency. Negative for dysuria and hematuria.   Musculoskeletal:  Positive for arthralgias and back pain.  "Negative for myalgias.   Skin:  Negative for rash.   Neurological:  Positive for weakness and light-headedness. Negative for headaches.   Psychiatric/Behavioral:  Negative for behavioral problems, confusion, sleep disturbance and suicidal ideas.        Objective   Vitals:  /76 (BP Location: Right arm, Patient Position: Sitting, BP Cuff Size: Large adult)   Pulse 68   Temp 36.1 °C (96.9 °F) (Temporal)   Resp 18   Ht 1.727 m (5' 8\")   Wt 118 kg (260 lb)   SpO2 98%   BMI 39.53 kg/m²     B-Type Natriuretic Peptide  Order: [id number]   Status: Final result       Visible to patient: No (inaccessible in  MyChart)    0 Result Notes   important suggestion  Newer results are available. Click to view them now.          Component  Ref Range & Units 3 wk ago 1 mo ago   BNP  0 - 99 pg/mL 31 63   Resulting Age        ensive Metabolic Panel  Order: [id number]   Status: Final result       Visible to patient: No (inaccessible in  [facility])    0 Result Notes   important suggestion  Newer results are available. Click to view them now.               Component  Ref Range & Units 3 wk ago  (8/17/24) 3 wk ago  (8/11/24) 4 wk ago  (8/10/24) 4 wk ago  (8/9/24) 1 mo ago  (8/8/24) 1 mo ago  (8/8/24) 5 mo ago  (3/20/24)   Glucose  74 - 99 mg/dL 139 High  139 High  133 High  119 High  78 115 High  111 High    Sodium  136 - 145 mmol/L 133 Low  133 Low  130 Low  134 Low  134 Low  135 Low  134 Low    Potassium  3.5 - 5.3 mmol/L 4.7 4.6 4.4 4.2 4.4 4.7 5.1   Chloride  98 - 107 mmol/L 103 102 101 103 103 105 103   Bicarbonate  21 - 32 mmol/L 21 24 20 Low  25 22 22 24   Anion Gap  10 - 20 mmol/L 14 12 13 10 13 13 12   Urea Nitrogen  6 - 23 mg/dL 25 High  21 20 20 24 High  28 High  20   Creatinine  0.50 - 1.30 mg/dL 1.44 High  1.24 1.33 High  1.02 1.18 1.25 1.17   eGFR  >60 mL/min/1.73m*2 52 Low  62 CM 57 Low  CM 79 CM 66 CM 62 CM 67 CM   Comment: Calculations of estimated GFR are performed using the 2021 CKD-EPI Study Refit equation " without the race variable for the IDMS-Traceable creatinine methods.  https://jasn.asnjournals.org/content/early/2021/09/22/ASN.9453625276   Calcium  8.6 - 10.3 mg/dL 8.9 9.1 8.9 9.0 8.9 9.3 9.0   Albumin  3.4 - 5.0 g/dL 3.6 3.7 3.7 3.4 3.7 3.8 3.9   Alkaline Phosphatase  33 - 136 U/L 81            roponin, High Sensitivity, 1 Hour  Order: [id number] - Part of Panel Order [id number]   Status: Final result       Visible to patient: No (inaccessible in [facility])    0 Result Notes         Component  Ref Range & Units 2 wk ago  (8/18/24) 3 wk ago  (8/17/24) 1 mo ago  (8/8/24) 1 mo ago  (8/8/24)   Troponin I, High Sensitivity  0 - 20 ng/L 4 5 4 5   Resulting Agency [facility]              Narrative     PSA  Order: [id number]   Status: Final result       Visible to patient: No (inaccessible in [facility])       Dx: Urinary frequency; Urinary incontinen...    0 Result Notes          Component  Ref Range & Units 7 mo ago 1 yr ago 2 yr ago 3 yr ago 4 yr ago   Prostate Specific AG  <=4.00 ng/mL 0.66 0.50 R, CM 0.50 R, CM 0.50 R, CM 0.60 R, CM   Resulting Agency [facility]          tains abnormal data Hemoglobin A1C  Order: [id number]   Status: Final result       Visible to patient: No (inaccessible in [facility])       Dx: Controlled type 2 diabetes mellitus w...    2 Result Notes       1  Topic            Component  Ref Range & Units 1 mo ago 5 mo ago 1 yr ago 2 yr ago 3 yr ago 4 yr ago 5 yr ago   Hemoglobin A1C  see below % 7.1 High  6.5 High  6.4 Abnormal  R, CM 6.2 Abnormal  R, CM 6.2 R, CM 5.9 R, CM 6.6 R, CM   Estimated Average Glucose  Not Established mg/dL 157              Physical Exam  Pressure sitting is 118 and standing is 110.  Otherwise a pulse is regular today with normal heart rate  General inspection reveals an obese gentleman in no acute distress  Cardiovascular very soft grade 1/2 over 6 systolic murmur only about 3-4 ectopy per minute otherwise regular sinus rhythm  Pulmonary no significant wet rales and  small right dry rales the right base otherwise no expiratory wheezing or rhonchi  Peripheral vascular mild perhaps trace over 4+ peripheral edema otherwise no Homans' sign abnormalities distal pulses are intact no sensory or motor deficits  Assessment & Plan  Gastroesophageal reflux disease, unspecified whether esophagitis present    Orders:    omeprazole (PriLOSEC) 40 mg DR capsule; Take 1 capsule (40 mg) by mouth once daily in the morning. Take before meals.    Cardiomyopathy, nonischemic (Multi)    Orders:    B-type natriuretic peptide; Future    Basic metabolic panel; Future    Type 2 diabetes mellitus without complication, without long-term current use of insulin (Multi)    Orders:    Basic metabolic panel; Future    Paroxysmal atrial fibrillation with RVR (Multi)         Medicare annual wellness visit, subsequent         Obesity, morbid (Multi)  Preserving a improve low carbohydrate low-salt low-fat diet with improving weight loss         Controlled type 2 diabetes mellitus with stage 3 chronic kidney disease, without long-term current use of insulin (Multi)  September 5 showed improving kidney function stable potassium and improving sugar  As noted above will take a his metoprolol only if systolic over 110  Patient aware to take his Aldactone for swelling to maintain his Xarelto continue his Cardizem at nighttime as well as his amiodarone above reviewed increasing liquids but certainly stressed low-salt low carbohydrate diet with an elevation of his A1c.  Will get his BNP BMP in about 2 days and follow-up in 2 weeks to review his home Accu-Cheks at home blood pressures.  Follow-up with EP follow-up cardiology aware of ER parameters elevation of the legs  @discharge  The above diagnosis and treatment plan was discussed with the patient patient will continue appropriate diet and exercise as reviewed  Patient will recheck earlier if any interval problems of significance or clinical worsening of the above  problems.  Agrees above surveillance.  All question were addressed regarding above meds        Routine general medical examination at health care facility

## 2024-09-05 ENCOUNTER — LAB (OUTPATIENT)
Dept: LAB | Facility: LAB | Age: 69
End: 2024-09-05
Payer: MEDICARE

## 2024-09-05 DIAGNOSIS — E11.9 TYPE 2 DIABETES MELLITUS WITHOUT COMPLICATION, WITHOUT LONG-TERM CURRENT USE OF INSULIN (MULTI): ICD-10-CM

## 2024-09-05 DIAGNOSIS — I42.8 CARDIOMYOPATHY, NONISCHEMIC (MULTI): ICD-10-CM

## 2024-09-05 LAB
ANION GAP SERPL CALC-SCNC: 13 MMOL/L (ref 10–20)
BNP SERPL-MCNC: 55 PG/ML (ref 0–99)
BUN SERPL-MCNC: 24 MG/DL (ref 6–23)
CALCIUM SERPL-MCNC: 9 MG/DL (ref 8.6–10.3)
CHLORIDE SERPL-SCNC: 104 MMOL/L (ref 98–107)
CO2 SERPL-SCNC: 22 MMOL/L (ref 21–32)
CREAT SERPL-MCNC: 1.39 MG/DL (ref 0.5–1.3)
EGFRCR SERPLBLD CKD-EPI 2021: 54 ML/MIN/1.73M*2
GLUCOSE SERPL-MCNC: 111 MG/DL (ref 74–99)
POTASSIUM SERPL-SCNC: 5.1 MMOL/L (ref 3.5–5.3)
SODIUM SERPL-SCNC: 134 MMOL/L (ref 136–145)

## 2024-09-05 PROCEDURE — 80048 BASIC METABOLIC PNL TOTAL CA: CPT

## 2024-09-05 PROCEDURE — 83880 ASSAY OF NATRIURETIC PEPTIDE: CPT

## 2024-09-05 PROCEDURE — 36415 COLL VENOUS BLD VENIPUNCTURE: CPT

## 2024-09-07 PROBLEM — Z00.00 MEDICARE ANNUAL WELLNESS VISIT, SUBSEQUENT: Status: ACTIVE | Noted: 2024-09-07

## 2024-09-07 PROBLEM — K21.9 GASTROESOPHAGEAL REFLUX DISEASE: Status: ACTIVE | Noted: 2024-09-07

## 2024-09-07 ASSESSMENT — ENCOUNTER SYMPTOMS
RHINORRHEA: 0
COUGH: 0
SINUS PRESSURE: 0
BACK PAIN: 1
ABDOMINAL PAIN: 0
MYALGIAS: 0
CONFUSION: 0
LIGHT-HEADEDNESS: 1
FATIGUE: 0
SLEEP DISTURBANCE: 0
SINUS PAIN: 0
WHEEZING: 0
FEVER: 0
SHORTNESS OF BREATH: 1
ARTHRALGIAS: 1
CHEST TIGHTNESS: 0
HEADACHES: 0
DYSURIA: 0
FREQUENCY: 1
PALPITATIONS: 0
HEMATURIA: 0
BLOOD IN STOOL: 0
VOMITING: 0
WEAKNESS: 1

## 2024-09-08 NOTE — ASSESSMENT & PLAN NOTE
Orders:    B-type natriuretic peptide; Future    Basic metabolic panel; Future    
  Orders:    Basic metabolic panel; Future    
  Orders:    omeprazole (PriLOSEC) 40 mg DR capsule; Take 1 capsule (40 mg) by mouth once daily in the morning. Take before meals.    
Preserving a improve low carbohydrate low-salt low-fat diet with improving weight loss         
September 5 showed improving kidney function stable potassium and improving sugar  As noted above will take a his metoprolol only if systolic over 110  Patient aware to take his Aldactone for swelling to maintain his Xarelto continue his Cardizem at nighttime as well as his amiodarone above reviewed increasing liquids but certainly stressed low-salt low carbohydrate diet with an elevation of his A1c.  Will get his BNP BMP in about 2 days and follow-up in 2 weeks to review his home Accu-Cheks at home blood pressures.  Follow-up with EP follow-up cardiology aware of ER parameters elevation of the legs  @discharge  The above diagnosis and treatment plan was discussed with the patient patient will continue appropriate diet and exercise as reviewed  Patient will recheck earlier if any interval problems of significance or clinical worsening of the above problems.  Agrees above surveillance.  All question were addressed regarding above meds        
PACU

## 2024-09-14 DIAGNOSIS — I48.0 PAROXYSMAL ATRIAL FIBRILLATION WITH RVR (MULTI): ICD-10-CM

## 2024-09-14 RX ORDER — AMIODARONE HYDROCHLORIDE 200 MG/1
TABLET ORAL
Qty: 30 TABLET | Refills: 0 | Status: SHIPPED | OUTPATIENT
Start: 2024-09-14

## 2024-09-18 ENCOUNTER — APPOINTMENT (OUTPATIENT)
Dept: PRIMARY CARE | Facility: CLINIC | Age: 69
End: 2024-09-18
Payer: MEDICARE

## 2024-09-18 VITALS
RESPIRATION RATE: 16 BRPM | HEART RATE: 70 BPM | OXYGEN SATURATION: 95 % | BODY MASS INDEX: 39.4 KG/M2 | DIASTOLIC BLOOD PRESSURE: 68 MMHG | SYSTOLIC BLOOD PRESSURE: 110 MMHG | WEIGHT: 260 LBS | HEIGHT: 68 IN | TEMPERATURE: 96.8 F

## 2024-09-18 DIAGNOSIS — E78.00 HYPERCHOLESTEROLEMIA: ICD-10-CM

## 2024-09-18 DIAGNOSIS — E11.22 CONTROLLED TYPE 2 DIABETES MELLITUS WITH STAGE 3 CHRONIC KIDNEY DISEASE, WITHOUT LONG-TERM CURRENT USE OF INSULIN (MULTI): Primary | ICD-10-CM

## 2024-09-18 DIAGNOSIS — I48.92 ATRIAL FLUTTER, UNSPECIFIED TYPE (MULTI): ICD-10-CM

## 2024-09-18 DIAGNOSIS — I10 PRIMARY HYPERTENSION: ICD-10-CM

## 2024-09-18 DIAGNOSIS — N18.30 CONTROLLED TYPE 2 DIABETES MELLITUS WITH STAGE 3 CHRONIC KIDNEY DISEASE, WITHOUT LONG-TERM CURRENT USE OF INSULIN (MULTI): Primary | ICD-10-CM

## 2024-09-18 PROCEDURE — 3051F HG A1C>EQUAL 7.0%<8.0%: CPT | Performed by: FAMILY MEDICINE

## 2024-09-18 PROCEDURE — 1160F RVW MEDS BY RX/DR IN RCRD: CPT | Performed by: FAMILY MEDICINE

## 2024-09-18 PROCEDURE — 1036F TOBACCO NON-USER: CPT | Performed by: FAMILY MEDICINE

## 2024-09-18 PROCEDURE — 3078F DIAST BP <80 MM HG: CPT | Performed by: FAMILY MEDICINE

## 2024-09-18 PROCEDURE — 4010F ACE/ARB THERAPY RXD/TAKEN: CPT | Performed by: FAMILY MEDICINE

## 2024-09-18 PROCEDURE — 1159F MED LIST DOCD IN RCRD: CPT | Performed by: FAMILY MEDICINE

## 2024-09-18 PROCEDURE — 1123F ACP DISCUSS/DSCN MKR DOCD: CPT | Performed by: FAMILY MEDICINE

## 2024-09-18 PROCEDURE — 99213 OFFICE O/P EST LOW 20 MIN: CPT | Performed by: FAMILY MEDICINE

## 2024-09-18 PROCEDURE — 3074F SYST BP LT 130 MM HG: CPT | Performed by: FAMILY MEDICINE

## 2024-09-18 PROCEDURE — 3008F BODY MASS INDEX DOCD: CPT | Performed by: FAMILY MEDICINE

## 2024-09-18 NOTE — PROGRESS NOTES
"Subjective   Patient ID: Johnathan Akins is a 69 y.o. male who presents for Atrial Fibrillation (2 week follow up ).  HPI  Pleasant 69-year-old gent with a history of hypertension dyslipidemia type 2 diabetes and chronic atrial fibrillation is on chronic Xarelto as well as amiodarone to beta-blockers/diltiazem and lisinopril and is followed by cardiology regularly.  Gratefully no increasing palpitations chest pain or shortness of breath try to lose weight and does take Aldactone for his diuretic with his history of gout in the past very mild renal sufficiency but recent chemistry shows an improved sugar down to 111 with mildly elevated creatinine but improved  Otherwise A1c went up to 7.2 earlier this spring and will recheck in 2 months.  Otherwise home sugars range from  depending on diet  Chronic meds reviewed no reported side effects.  Does follow a low-salt low carbohydrate low-fat diet in view of above comorbidities  Review of Systems   Constitutional:  Negative for fatigue, fever and unexpected weight change.   Eyes:  Negative for visual disturbance.   Respiratory:  Negative for cough, chest tightness and shortness of breath.    Cardiovascular:  Positive for palpitations. Negative for chest pain and leg swelling.   Gastrointestinal:  Negative for abdominal pain and blood in stool.   Genitourinary:  Positive for frequency. Negative for dysuria and hematuria.   Musculoskeletal:  Positive for arthralgias. Negative for myalgias.   Skin:  Negative for rash.   Neurological:  Negative for weakness, light-headedness, numbness and headaches.   Psychiatric/Behavioral:  Negative for behavioral problems, confusion, sleep disturbance and suicidal ideas.        Objective   /68 (BP Location: Right arm, Patient Position: Sitting, BP Cuff Size: Large adult)   Pulse 70   Temp 36 °C (96.8 °F) (Temporal)   Resp 16   Ht 1.727 m (5' 8\")   Wt 118 kg (260 lb)   SpO2 95%   BMI 39.53 kg/m²     Lab Results "   Component Value Date    GLUCOSE 111 (H) 09/05/2024    CALCIUM 9.0 09/05/2024     (L) 09/05/2024    K 5.1 09/05/2024    CO2 22 09/05/2024     09/05/2024    BUN 24 (H) 09/05/2024    CREATININE 1.39 (H) 09/05/2024   Hemoglobin A1C  Order: [id number]   Status: Final result       Visible to patient: No (inaccessible in [facility])       Dx: Controlled type 2 diabetes mellitus w...    2 Result Notes       1  Topic            Component  Ref Range & Units 1 mo ago 6 mo ago 1 yr ago 2 yr ago 3 yr ago 4 yr ago 5 yr ago   Hemoglobin A1C  see below % 7.1 High  6.5 High  6.4 Abnormal  R, CM 6.2 Abnormal  R, CM 6.2 R, CM 5.9 R, CM 6.6 R, CM   Estimated Average Glucose  Not Established mg/dL 15                    Component  Ref Range & Units 13 d ago  (9/5/24) 1 mo ago  (8/17/24) 1 mo ago  (8/8/24)   BNP  0 - 99 pg/mL 55 31 63   Resulting Agency [facility]             Contains abnormal data Basic metabolic panel  Order: [id number]   Status: Final result       Visible to patient: No (inaccessible in [facility])       Dx: Cardiomyopathy, nonischemic (Multi); ...    3 Result Notes            Component  Ref Range & Units 2 wk ago  (9/5/24) 1 mo ago  (8/17/24) 1 mo ago  (8/11/24) 1 mo ago  (8/10/24) 1 mo ago  (8/9/24) 1 mo ago  (8/8/24) 1 mo ago  (8/8/24)   Glucose  74 - 99 mg/dL 111 High  139 High  139 High  133 High  119 High  78 115 High    Sodium  136 - 145 mmol/L 134 Low  133 Low  133 Low  130 Low  134 Low  134 Low  135 Low    Potassium  3.5 - 5.3 mmol/L 5.1 4.7 4.6 4.4 4.2 4.4 4.7   Chloride  98 - 107 mmol/L 104 103 102 101 103 103 105   Bicarbonate  21 - 32 mmol/L 22 21 24 20 Low  25 22 22   Anion Gap  10 - 20 mmol/L 13 14 12 13 10 13 13   Urea Nitrogen  6 - 23 mg/dL 24 High  25 High  21 20 20 24 High  28 High    Creatinine  0.50 - 1.30 mg/dL 1.39 High  1.44 High  1.24 1.33 High  1.02 1.18 1.25   eGFR  >60 mL/min/1.73m*2 54 Low  52 Low  CM 62 CM 57 Low  CM 79 CM 66 CM 62 CM   Comment: Calculations of estimated GFR  are performed using the 2021 CKD-EPI Study Refit equation without the race variable for the IDMS-Traceable creatinine methods.  https://jasn.asnjournals.org/content/early/2021/09/22/ASN.1698736666   Calcium  8.6 - 10.3 mg/dL 9.0 8.9 9.1 8.9 9.0 8.9 9.3   Resulting Agency [facility]              Specimen Collected: 09        Narrative  Performed by: [facility]     <100 pg/mL - Heart failure unlikely  100-299 pg/mL - Intermediate probability of acute heart                  failure exacerbation. Correlate with clinical                  context and patient history.    >=300 pg/mL - Heart Failure likely. Correlate with clinical                  context and patient history.    BNP testing is performed using different testing methodology at [facility] than at other [facility]. Direct result comparisons should only be made within the same method.      Specimen Collected: 09/05/24 09:40 Last Resulted: 09/05/24 14:13      Magnesium  Order: [id number]   Status: Final result       Visible to patient: No (inaccessible in [facility])    0 Result Notes           Component  Ref Range & Units 1 mo ago  (8/17/24) 1 mo ago  (8/11/24) 1 mo ago  (8/10/24) 1 mo ago  (8/9/24) 1 mo ago  (8/8/24) 1 mo ago  (8/8/24)   Magnesium  1.60 - 2.40 mg/dL 1.74 1.81 1.87 1.96 1.52 Low  1.59 Low    Resulting Agency [facility]          Cholesterol, LDL Direct  Order: [id number]   Status: Final result       Visible to patient: No (inaccessible in [facility])    0 Result Notes       1  Topic       Component  Ref Range & Units 1 yr ago 4 yr ago   LDL Direct  0 - 129 mg/dL 89 89 CM   Comment: Elevated levels of LDL cholesterol are recognized as a key  factor in the development of atherosclerosis and CHD. The        Physical Exam  Also reviewed and normal pulse irregular but good at 62 pulse ox is normal  General  is a obese white male in no acute distress  Neck neck is all without mass adenopathy bruits rigidity  Cardiovascular  controlled ventricular rate of irregular irregularity soft grade 1/2 over 6 systolic extra murmur  Pulmonary mildly reduced breath sounds at base otherwise no wheezing rales or rhonchi  Abdominal no organomegaly mass rebound or mid upper abdomen no CVA tenderness  Peripheral vascular diffuse varicosities but otherwise no striking sensorimotor vascular deficits no edema  Mood mood is stable and anxiety present for cognitive issues      Assessment/Plan   Problem List Items Addressed This Visit    None  Patient will continue low-salt low carbohydrate and low-fat diet minimally elevated 7.1 A1c and will recheck in 2 months to recheck his home Accu-Cheks check his A1c in the office otherwise improved kidney functions and improved sugar recently down to 111 with normal potassium we will check lipids before the and the holidays but otherwise follow-up with cardiology continue his Xarelto diltiazem beta-blocker Aldactone and ACE inhibitor potassium been stable otherwise clinically stable at this time advised a COVID and influenza this autumn call if interval problems aware of ER parameters  @discharge  The above diagnosis and treatment plan was discussed with the patient patient will continue appropriate diet and exercise as reviewed  Patient will recheck earlier if any interval problems of significance or clinical worsening of the above problems.  Agrees above surveillance.  All question were addressed regarding above meds

## 2024-09-22 ASSESSMENT — ENCOUNTER SYMPTOMS
BLOOD IN STOOL: 0
PALPITATIONS: 1
ARTHRALGIAS: 1
NUMBNESS: 0
ABDOMINAL PAIN: 0
FEVER: 0
FATIGUE: 0
COUGH: 0
LIGHT-HEADEDNESS: 0
CONFUSION: 0
SLEEP DISTURBANCE: 0
UNEXPECTED WEIGHT CHANGE: 0
HEMATURIA: 0
FREQUENCY: 1
HEADACHES: 0
SHORTNESS OF BREATH: 0
CHEST TIGHTNESS: 0
MYALGIAS: 0
WEAKNESS: 0
DYSURIA: 0

## 2024-10-01 ENCOUNTER — PATIENT OUTREACH (OUTPATIENT)
Dept: PRIMARY CARE | Facility: CLINIC | Age: 69
End: 2024-10-01
Payer: MEDICARE

## 2024-10-02 NOTE — PROGRESS NOTES
Successful outreach to patient regarding hospitalization as patient continues TCM program.   At time of outreach call the patient feels as if their condition has improved since initial visit with PCP or specialist. Patient states he is doing pretty good.  Questions or concerns addressed at this time with patient.   Provided contact information to patient if any further non-emergent needs arise.

## 2024-10-29 DIAGNOSIS — R35.0 INCREASED URINARY FREQUENCY: ICD-10-CM

## 2024-10-29 DIAGNOSIS — R32 URINARY INCONTINENCE, UNSPECIFIED TYPE: ICD-10-CM

## 2024-11-04 RX ORDER — TERAZOSIN 2 MG/1
2 CAPSULE ORAL
Qty: 90 CAPSULE | Refills: 3 | Status: SHIPPED | OUTPATIENT
Start: 2024-11-04

## 2024-11-05 ENCOUNTER — PATIENT OUTREACH (OUTPATIENT)
Dept: PRIMARY CARE | Facility: CLINIC | Age: 69
End: 2024-11-05
Payer: MEDICARE

## 2024-11-05 NOTE — PROGRESS NOTES
Successful outreach to patient regarding 90 day post discharge follow up.  At time of outreach call the patient feels as if their condition has (improved) since initial visit with PCP or specialist. Patient states everything is going very well.  Questions or concerns addressed at this time with patient. Aware to follow up with provider should any future concerns arise.

## 2024-11-08 DIAGNOSIS — R33.9 RETENTION OF URINE: ICD-10-CM

## 2024-11-08 RX ORDER — OXYBUTYNIN CHLORIDE 5 MG/1
5 TABLET ORAL NIGHTLY
Qty: 90 TABLET | Refills: 3 | Status: SHIPPED | OUTPATIENT
Start: 2024-11-08

## 2024-11-11 DIAGNOSIS — R33.9 RETENTION OF URINE: ICD-10-CM

## 2024-11-11 RX ORDER — OXYBUTYNIN CHLORIDE 10 MG/1
10 TABLET, EXTENDED RELEASE ORAL NIGHTLY
Qty: 90 TABLET | Refills: 3 | Status: SHIPPED | OUTPATIENT
Start: 2024-11-11 | End: 2024-11-12 | Stop reason: SDUPTHER

## 2024-11-12 DIAGNOSIS — R33.9 RETENTION OF URINE: ICD-10-CM

## 2024-11-12 RX ORDER — OXYBUTYNIN CHLORIDE 10 MG/1
10 TABLET, EXTENDED RELEASE ORAL NIGHTLY
Qty: 90 TABLET | Refills: 3 | Status: SHIPPED | OUTPATIENT
Start: 2024-11-12 | End: 2025-11-07

## 2024-11-18 ENCOUNTER — APPOINTMENT (OUTPATIENT)
Dept: PRIMARY CARE | Facility: CLINIC | Age: 69
End: 2024-11-18
Payer: MEDICARE

## 2024-11-18 VITALS
WEIGHT: 270 LBS | DIASTOLIC BLOOD PRESSURE: 62 MMHG | HEART RATE: 73 BPM | TEMPERATURE: 97.7 F | SYSTOLIC BLOOD PRESSURE: 112 MMHG | HEIGHT: 68 IN | OXYGEN SATURATION: 97 % | RESPIRATION RATE: 16 BRPM | BODY MASS INDEX: 40.92 KG/M2

## 2024-11-18 DIAGNOSIS — S39.012A STRAIN OF LUMBAR REGION, INITIAL ENCOUNTER: Primary | ICD-10-CM

## 2024-11-18 DIAGNOSIS — M54.50 LUMBAR PAIN: ICD-10-CM

## 2024-11-18 LAB
POC APPEARANCE, URINE: CLEAR
POC BILIRUBIN, URINE: NEGATIVE
POC BLOOD, URINE: NEGATIVE
POC COLOR, URINE: YELLOW
POC GLUCOSE, URINE: NEGATIVE MG/DL
POC KETONES, URINE: NEGATIVE MG/DL
POC LEUKOCYTES, URINE: NEGATIVE
POC NITRITE,URINE: NEGATIVE
POC PH, URINE: 6 PH
POC PROTEIN, URINE: NEGATIVE MG/DL
POC SPECIFIC GRAVITY, URINE: 1.02
POC UROBILINOGEN, URINE: 1 EU/DL

## 2024-11-18 PROCEDURE — 4010F ACE/ARB THERAPY RXD/TAKEN: CPT | Performed by: FAMILY MEDICINE

## 2024-11-18 PROCEDURE — 3078F DIAST BP <80 MM HG: CPT | Performed by: FAMILY MEDICINE

## 2024-11-18 PROCEDURE — 1123F ACP DISCUSS/DSCN MKR DOCD: CPT | Performed by: FAMILY MEDICINE

## 2024-11-18 PROCEDURE — 3074F SYST BP LT 130 MM HG: CPT | Performed by: FAMILY MEDICINE

## 2024-11-18 PROCEDURE — 81003 URINALYSIS AUTO W/O SCOPE: CPT | Performed by: FAMILY MEDICINE

## 2024-11-18 PROCEDURE — 99213 OFFICE O/P EST LOW 20 MIN: CPT | Performed by: FAMILY MEDICINE

## 2024-11-18 PROCEDURE — 1036F TOBACCO NON-USER: CPT | Performed by: FAMILY MEDICINE

## 2024-11-18 PROCEDURE — 1126F AMNT PAIN NOTED NONE PRSNT: CPT | Performed by: FAMILY MEDICINE

## 2024-11-18 PROCEDURE — 3008F BODY MASS INDEX DOCD: CPT | Performed by: FAMILY MEDICINE

## 2024-11-18 PROCEDURE — 1159F MED LIST DOCD IN RCRD: CPT | Performed by: FAMILY MEDICINE

## 2024-11-18 PROCEDURE — 1160F RVW MEDS BY RX/DR IN RCRD: CPT | Performed by: FAMILY MEDICINE

## 2024-11-18 PROCEDURE — 3051F HG A1C>EQUAL 7.0%<8.0%: CPT | Performed by: FAMILY MEDICINE

## 2024-11-18 RX ORDER — MAGNESIUM 200 MG
1 TABLET ORAL DAILY
COMMUNITY

## 2024-11-18 RX ORDER — CYCLOBENZAPRINE HCL 5 MG
TABLET ORAL
Qty: 30 TABLET | Refills: 0 | Status: SHIPPED | OUTPATIENT
Start: 2024-11-18

## 2024-11-18 ASSESSMENT — PAIN SCALES - GENERAL: PAINLEVEL_OUTOF10: 0-NO PAIN

## 2024-11-18 NOTE — PROGRESS NOTES
"Subjective   Patient ID: Johnathan Akins is a 69 y.o. male who presents for Diabetes Mellitus (2 month follow up ) and Back Pain (Lower back pain).  HPI  69-year-old with history of paroxysmal atrial fibrillation type 2 diabetes here because about 4 to 5 weeks of intermittent tightness in his right and left lower back area denies any fall and really does not account for any direct event after which she is back pain occurred  Denies any abdominal pain rating to the back and denies back pain radiating to the abdomen no new GI or  symptoms.  Denies any chest pain short of the palpitations fever chills.  Back pain is stiff in the morning feels somewhat better but then again gets tight in the evening no numbness weakness or paresthesias the legs no numbness of the perineal area  Is on Xarelto so unable to take NSAIDs.  It is discontinued now for 4 to 5 weeks.  Prior history of significant back pain  Review of Systems   Constitutional:  Negative for fatigue, fever and unexpected weight change.   Eyes:  Negative for visual disturbance.   Respiratory:  Negative for cough, chest tightness and shortness of breath.    Cardiovascular:  Negative for chest pain, palpitations and leg swelling.   Gastrointestinal:  Negative for abdominal pain and blood in stool.   Genitourinary:  Positive for frequency. Negative for dysuria, flank pain and hematuria.   Musculoskeletal:  Positive for arthralgias, back pain and myalgias.   Skin:  Negative for rash.   Neurological:  Negative for weakness, light-headedness, numbness and headaches.   Psychiatric/Behavioral:  Negative for behavioral problems, confusion, sleep disturbance and suicidal ideas.        Objective   /62 (BP Location: Left arm, Patient Position: Sitting, BP Cuff Size: Large adult)   Pulse 73   Temp 36.5 °C (97.7 °F) (Temporal)   Resp 16   Ht 1.727 m (5' 8\")   Wt 122 kg (270 lb)   SpO2 97%   BMI 41.05 kg/m²   Results  POCT UA Automated manually resulted (Order " [id number])  All Reviewers List    Nalini Khan MA on 11/20/2024 08:00   Livan Bartholomew DO on 11/19/2024 19:03     POCT UA Automated manually resulted  Order: [id number]   Status: Final result       Visible to patient: No (inaccessible in [facility])       Next appt: 12/09/2024 at 03:15 PM in Primary Care (Livan Bartholomew DO)       Dx: Lumbar pain    2 Result Notes        Component  Ref Range & Units 3 d ago  (11/18/24) 1 yr ago  (10/9/23) 1 yr ago  (9/13/23)   POC Color, Urine  Straw, Yellow, Light-Yellow Yellow Yellow Yellow R   POC Appearance, Urine  Clear Clear Clear Clear   POC Glucose, Urine  NEGATIVE mg/dl NEGATIVE NEGATIVE NEGATIVE   POC Bilirubin, Urine  NEGATIVE NEGATIVE NEGATIVE NEGATIVE   POC Ketones, Urine  NEGATIVE mg/dl NEGATIVE NEGATIVE NEGATIVE   POC Specific Gravity, Urine  1.005 - 1.035 1.020 1.020 1.025   POC Blood, Urine  NEGATIVE NEGATIVE NEGATIVE NEGATIVE   POC PH, Urine  No Reference Range Established PH 6.0 5.5 5.5   POC Protein, Urine  NEGATIVE, 30 (1+) mg/dl NEGATIVE NEGATIVE NEGATIVE   POC Urobilinogen, Urine  0.2, 1.0 EU/DL 1.0 0.2 0.2   Poc Nitrite, Urine  NEGATIVE NEGATIVE NEGATIVE NEGATIVE   POC Leukocytes, Urine  NEGATIVE NEGATIVE NEGATIVE SMALL (1+) Abnormal              Specimen Collected:    Contains abnormal data Basic metabolic panel  Order: [id number]   Status: Final result       Visible to patient: No (inaccessible in [facility])       Dx: Cardiomyopathy, nonischemic (Multi); ...    3 Result Notes            Component  Ref Range & Units 2 mo ago  (9/5/24) 3 mo ago  (8/17/24) 3 mo ago  (8/11/24) 3 mo ago  (8/10/24) 3 mo ago  (8/9/24) 3 mo ago  (8/8/24) 3 mo ago  (8/8/24)   Glucose  74 - 99 mg/dL 111 High  139 High  139 High  133 High  119 High  78 115 High    Sodium  136 - 145 mmol/L 134 Low  133 Low  133 Low  130 Low  134 Low  134 Low  135 Low    Potassium  3.5 - 5.3 mmol/L 5.1 4.7 4.6 4.4 4.2 4.4 4.7   Chloride  98 - 107 mmol/L 104 103 102 101 103 103 105   Bicarbonate  21 -  32 mmol/L 22 21 24 20 Low  25 22 22   Anion Gap  10 - 20 mmol/L 13 14 12 13 10 13 13   Urea Nitrogen  6 - 23 mg/dL 24 High  25 High  21 20 20 24 High  28 High    Creatinine  0.50 - 1.30 mg/dL 1.39 High  1.44 High  1.24 1.33 High  1.02 1.18 1.25   eGFR  >60 mL/min/1.73m*2 54 Low  52 Low  CM 62 CM 57 Low  CM 79 CM 66 CM 62 CM   Comment: Calculations of estimated GFR are performed using the 2021 CKD-EPI Study Refit equation without the race variable for the IDMS-Traceable creatinine methods.  https://jasn.asnjournals.org/content/early/2021/09/22/ASN.3224980683   Calcium  8.6 - 10.3 mg/dL 9.0 8.9 9.1 8.9 9.0 8.9 9.3   [facility]           B-type natriuretic peptide  Order: [id number]   Status: Final result       Visible to patient: No (inaccessible in [facility])       Dx: Cardiomyopathy, nonischemic (Multi)    3 Result Notes        Component  Ref Range & Units 2 mo ago  (9/5/24) 3 mo ago  (8/17/24) 3 mo ago  (8/8/24)   BNP  0 - 99 pg/mL 55 31 63   Resulting Agency [facility]              Narrative  Performed by: [facility]     <100 pg/mL - Heart failure unlikely  100-299 p   s abnormal data Hemoglobin A1C  Order: [id number]   Status: Final result       Visible to patient: No (inaccessible in [facility])       Dx: Controlled type 2 diabetes mellitus w...    2 Result Notes       1  Topic            Component  Ref Range & Units 3 mo ago  (8/7/24) 8 mo ago  (3/20/24) 1 yr ago  (4/18/23) 2 yr ago  (4/8/22) 3 yr ago  (6/9/21) 5 yr ago  (11/6/19) 5 yr ago  (8/7/19)   Hemoglobin A1C  see below % 7.1 High  6.5 High  6.4 Abnormal  R, CM 6.2 Abnormal  R, CM 6.2 R, CM 5.9 R, CM 6.6 R, CM   Estimated Average Glucose  Not Established mg/dL 157 140                     Physical Exam  Vital sign reviewed and normal  General Inspection reveals a slightly uncomfortable individual.  Chest chest is clear anteriorly posteriorly  Cardiovascular regular sinus rhythm today only 1 or 2 ectopy per minute.  Abdominal  obese without organomegaly mass rebound specifically no CVA tenderness  Musculoskeletal no posterior thoracic rashes pain is not reproduced over the dorsal or lumbar vertebral prominences more over the right paravertebral muscles from T12-L4 more on the right than the left no CVA tenderness no discrete tenderness over the SI joints some limited flexion is noted otherwise reflexes lower extremities are normal straight leg raising is negative hip range of motion and palpation is normal.  Pain reproduced over palpation of the right more than left paravertebral lumbar muscles      Assessment/Plan   Problem List Items Addressed This Visit    None  Recent fall the exam is more compatible with muscle strain is no pain over the bony prominences in the dorsal lumbar area neurological exam of the lower extremity is normal and offered x-rays but declines physical therapy and/or x-rays  Will give Flexeril 5 mg twice a day in the daytime and 10 mg at bedtime may take to Exer strength Tylenol since he is on Xarelto for paroxysmal atrial fibs would like to show away from steroids with his history of diabetes so we will call to recheck in 10 to 14 days if persistence may consider physical therapy if persistence or worsening declines orthopedic referral at this time.  Gratefully his urine is completely negative today  Continue above good low-salt low carbohydrate low-fat diet and continue the buffed cardiac medicines as above  @discharge  The above diagnosis and treatment plan was discussed with the patient patient will continue appropriate diet and exercise as reviewed  Patient will recheck earlier if any interval problems of significance or clinical worsening of the above problems.  Agrees above surveillance.  All question were addressed regarding above meds

## 2024-11-21 PROBLEM — M54.50 LUMBAR PAIN: Status: ACTIVE | Noted: 2024-11-21

## 2024-11-21 PROBLEM — S39.012A STRAIN OF LUMBAR REGION: Status: ACTIVE | Noted: 2024-11-21

## 2024-11-21 ASSESSMENT — ENCOUNTER SYMPTOMS
FLANK PAIN: 0
SHORTNESS OF BREATH: 0
SLEEP DISTURBANCE: 0
ARTHRALGIAS: 1
NUMBNESS: 0
LIGHT-HEADEDNESS: 0
DYSURIA: 0
FREQUENCY: 1
FEVER: 0
FATIGUE: 0
PALPITATIONS: 0
CONFUSION: 0
WEAKNESS: 0
ABDOMINAL PAIN: 0
CHEST TIGHTNESS: 0
HEMATURIA: 0
MYALGIAS: 1
BLOOD IN STOOL: 0
HEADACHES: 0
BACK PAIN: 1
COUGH: 0
UNEXPECTED WEIGHT CHANGE: 0

## 2024-12-09 ENCOUNTER — APPOINTMENT (OUTPATIENT)
Dept: PRIMARY CARE | Facility: CLINIC | Age: 69
End: 2024-12-09
Payer: MEDICARE

## 2024-12-13 DIAGNOSIS — R35.0 INCREASED URINARY FREQUENCY: ICD-10-CM

## 2024-12-13 DIAGNOSIS — R32 URINARY INCONTINENCE, UNSPECIFIED TYPE: ICD-10-CM

## 2024-12-14 RX ORDER — TAMSULOSIN HYDROCHLORIDE 0.4 MG/1
0.4 CAPSULE ORAL DAILY
Qty: 90 CAPSULE | Refills: 3 | Status: SHIPPED | OUTPATIENT
Start: 2024-12-14

## 2024-12-24 ENCOUNTER — APPOINTMENT (OUTPATIENT)
Dept: PRIMARY CARE | Facility: CLINIC | Age: 69
End: 2024-12-24
Payer: MEDICARE

## 2024-12-24 VITALS
TEMPERATURE: 96.8 F | BODY MASS INDEX: 41.07 KG/M2 | WEIGHT: 271 LBS | HEART RATE: 69 BPM | RESPIRATION RATE: 16 BRPM | SYSTOLIC BLOOD PRESSURE: 112 MMHG | OXYGEN SATURATION: 98 % | HEIGHT: 68 IN | DIASTOLIC BLOOD PRESSURE: 74 MMHG

## 2024-12-24 DIAGNOSIS — J40 BRONCHITIS: Primary | ICD-10-CM

## 2024-12-24 DIAGNOSIS — M54.50 LUMBAR PAIN: ICD-10-CM

## 2024-12-24 PROCEDURE — 4010F ACE/ARB THERAPY RXD/TAKEN: CPT | Performed by: FAMILY MEDICINE

## 2024-12-24 PROCEDURE — 3051F HG A1C>EQUAL 7.0%<8.0%: CPT | Performed by: FAMILY MEDICINE

## 2024-12-24 PROCEDURE — 3008F BODY MASS INDEX DOCD: CPT | Performed by: FAMILY MEDICINE

## 2024-12-24 PROCEDURE — 3074F SYST BP LT 130 MM HG: CPT | Performed by: FAMILY MEDICINE

## 2024-12-24 PROCEDURE — 1123F ACP DISCUSS/DSCN MKR DOCD: CPT | Performed by: FAMILY MEDICINE

## 2024-12-24 PROCEDURE — 99213 OFFICE O/P EST LOW 20 MIN: CPT | Performed by: FAMILY MEDICINE

## 2024-12-24 PROCEDURE — 3078F DIAST BP <80 MM HG: CPT | Performed by: FAMILY MEDICINE

## 2024-12-24 RX ORDER — CYCLOBENZAPRINE HCL 5 MG
TABLET ORAL
Qty: 90 TABLET | Refills: 1 | Status: SHIPPED | OUTPATIENT
Start: 2024-12-24

## 2024-12-24 RX ORDER — CEFUROXIME AXETIL 250 MG/1
250 TABLET ORAL 2 TIMES DAILY
Qty: 20 TABLET | Refills: 0 | Status: SHIPPED | OUTPATIENT
Start: 2024-12-24 | End: 2025-01-03

## 2024-12-24 NOTE — PROGRESS NOTES
Subjective   Patient ID: Johnathan Akins is a 69 y.o. male who presents for Back Pain (4 week follow up ).  HPI  69-year-old gentleman is here primarily because of mid to right and left low back tightness.  Is been off and on for about 1 week after bending over and standing up with abrupt pain.  No numbness weakness paresthesia of the lower legs.  Also has intermittent cough with coryza.  Does need a follow-up CT in February to follow-up density from August.  Otherwise no hemoptysis no significant palpitations with his history of paroxysmal atrial fibrillation.  He is to change his multiple cardiac medicines including beta-blocker diltiazem amiodarone lisinopril and Xarelto.  Patient denies high fever shaking chills headache rash GI symptoms or significant dyspnea on exertion.  No hemoptysis related  Like to have something to help relax his tension in his low back as it occurs with prolonged sitting and quick motion.  Will take NSAIDs because he takes Xarelto at this time  Review of Systems   Constitutional:  Negative for fatigue, fever and unexpected weight change.   HENT:  Positive for rhinorrhea and sinus pressure. Negative for sore throat.    Eyes:  Negative for visual disturbance.   Respiratory:  Positive for cough. Negative for chest tightness, shortness of breath and wheezing.    Cardiovascular:  Negative for chest pain, palpitations and leg swelling.   Gastrointestinal:  Negative for abdominal pain, blood in stool and vomiting.   Genitourinary:  Positive for frequency. Negative for dysuria, flank pain and hematuria.   Musculoskeletal:  Positive for arthralgias, back pain and myalgias. Negative for neck pain.   Skin:  Negative for rash.   Neurological:  Negative for tremors, weakness, light-headedness, numbness and headaches.   Psychiatric/Behavioral:  Negative for behavioral problems, confusion, sleep disturbance and suicidal ideas.        Objective   /74 (BP Location: Right arm, Patient Position:  "Sitting, BP Cuff Size: Large adult)   Pulse 69   Temp 36 °C (96.8 °F) (Temporal)   Resp 16   Ht 1.727 m (5' 8\")   Wt 123 kg (271 lb)   SpO2 98%   BMI 41.21 kg/m²           Physical Exam  Vital sign review normal pulse ox good 98% respiration easy pulse regular at 68 distant 72 blood pressure normal  ENT nose shows mild to moderate swelling with yellow rhinorrhea on the right very minimal tenderness of the right maxillary sinus both TMs scarred retracted without redness oral exam is benign  Neck neck is supple out mass adenopathy bruits rigidity  Chest very faint expiratory rhonchi partially clear with coughing otherwise no bibasilar rales or rubs  Cardiovascular soft grade 1/2 over 6 systolic extra murmur otherwise only 1 or 2 ectopy per minute no gallop  Musculoskeletal tenderness reproduced the paravertebral muscles right greater than left from about T12-L4 no discrete thoracic or lumbar bony tenderness some tenderness over the medial and inferior right SI joint more than the left.  Straight leg raising is negative no numbness or weakness lower extremities no lateral hip pain upon palpation no CVA tenderness  Abdominal no organomegaly mass or rebound      Assessment/Plan   Problem List Items Addressed This Visit       Lumbar pain   Recent lumbar strain will place on Flexeril 5 mg at 9 AM and 3 PM then can take 2 at bedtime warm moist compresses stretching exercise advised  Continue above chronic cardiac medicines follow-up with Dr. Montoya  Will give Ceftin for interval sinus bronchitis and follow-up in 2 months will get CT of the chest to follow-up the left perihilar area he is agreement to this  Continue his good diabetic diet metformin and glipizide 3 times a day  Will check BMP and A1c LDL when seen later this winter Russians were addressed may alternate Exer strength Tylenol if the above Flexeril.  If interval worsening and notify me with diabetes prefer to remain off steroids at this time he " agrees  Call if interval worsening in the next 2 weeks  @discharge  The above diagnosis and treatment plan was discussed with the patient patient will continue appropriate diet and exercise as reviewed  Patient will recheck earlier if any interval problems of significance or clinical worsening of the above problems.  Agrees above surveillance.  All question were addressed regarding above meds

## 2024-12-30 ASSESSMENT — ENCOUNTER SYMPTOMS
FLANK PAIN: 0
LIGHT-HEADEDNESS: 0
CHEST TIGHTNESS: 0
SLEEP DISTURBANCE: 0
SORE THROAT: 0
WHEEZING: 0
ABDOMINAL PAIN: 0
COUGH: 1
NUMBNESS: 0
VOMITING: 0
FREQUENCY: 1
DYSURIA: 0
BACK PAIN: 1
RHINORRHEA: 1
SINUS PRESSURE: 1
SHORTNESS OF BREATH: 0
UNEXPECTED WEIGHT CHANGE: 0
MYALGIAS: 1
BLOOD IN STOOL: 0
HEADACHES: 0
CONFUSION: 0
HEMATURIA: 0
FATIGUE: 0
ARTHRALGIAS: 1
FEVER: 0
TREMORS: 0
WEAKNESS: 0
PALPITATIONS: 0
NECK PAIN: 0

## 2025-01-29 DIAGNOSIS — I10 PRIMARY HYPERTENSION: ICD-10-CM

## 2025-01-29 RX ORDER — LISINOPRIL 5 MG/1
5 TABLET ORAL DAILY
Qty: 90 TABLET | Refills: 3 | Status: SHIPPED | OUTPATIENT
Start: 2025-01-29

## 2025-01-29 NOTE — TELEPHONE ENCOUNTER
Rx Refill Request     Name: Johnathan Akins  :  1955     Date of last appointment:  2024   Date of next appointment:  3/3/2025   Best number to reach patient:  [phone]

## 2025-02-06 DIAGNOSIS — E78.00 HYPERCHOLESTEROLEMIA: ICD-10-CM

## 2025-02-07 RX ORDER — SIMVASTATIN 20 MG/1
20 TABLET, FILM COATED ORAL DAILY
Qty: 90 TABLET | Refills: 3 | Status: SHIPPED | OUTPATIENT
Start: 2025-02-07

## 2025-02-21 ENCOUNTER — LAB (OUTPATIENT)
Dept: LAB | Facility: HOSPITAL | Age: 70
End: 2025-02-21
Payer: MEDICARE

## 2025-02-21 DIAGNOSIS — R33.9 RETENTION OF URINE: ICD-10-CM

## 2025-02-21 DIAGNOSIS — R82.89 ABNORMAL URINE CYTOLOGY: ICD-10-CM

## 2025-02-21 DIAGNOSIS — R33.9 RETENTION OF URINE, UNSPECIFIED: Primary | ICD-10-CM

## 2025-02-21 LAB — PSA SERPL-MCNC: 0.47 NG/ML

## 2025-02-22 LAB
APPEARANCE UR: CLEAR
BACTERIA #/AREA URNS HPF: ABNORMAL /HPF
BILIRUB UR QL STRIP: NEGATIVE
COLOR UR: ABNORMAL
GLUCOSE UR QL STRIP: NEGATIVE
HGB UR QL STRIP: NEGATIVE
HYALINE CASTS #/AREA URNS LPF: > 60 /LPF
KETONES UR QL STRIP: ABNORMAL
LEUKOCYTE ESTERASE UR QL STRIP: ABNORMAL
NITRITE UR QL STRIP: NEGATIVE
PH UR STRIP: 5.5 [PH] (ref 5–8)
PROT UR QL STRIP: ABNORMAL
RBC #/AREA URNS HPF: ABNORMAL /HPF
SERVICE CMNT-IMP: ABNORMAL
SP GR UR STRIP: 1.02 (ref 1–1.03)
SQUAMOUS #/AREA URNS HPF: ABNORMAL /HPF
WBC #/AREA URNS HPF: ABNORMAL /HPF

## 2025-02-23 LAB — BACTERIA UR CULT: NORMAL

## 2025-02-24 ENCOUNTER — APPOINTMENT (OUTPATIENT)
Dept: UROLOGY | Facility: CLINIC | Age: 70
End: 2025-02-24
Payer: MEDICARE

## 2025-02-24 VITALS
RESPIRATION RATE: 16 BRPM | DIASTOLIC BLOOD PRESSURE: 76 MMHG | SYSTOLIC BLOOD PRESSURE: 119 MMHG | BODY MASS INDEX: 41.5 KG/M2 | HEIGHT: 68 IN | HEART RATE: 82 BPM | WEIGHT: 273.81 LBS

## 2025-02-24 DIAGNOSIS — R82.89 ABNORMAL URINE CYTOLOGY: ICD-10-CM

## 2025-02-24 DIAGNOSIS — R33.9 RETENTION OF URINE: ICD-10-CM

## 2025-02-24 DIAGNOSIS — N39.46 MIXED STRESS AND URGE URINARY INCONTINENCE: Primary | ICD-10-CM

## 2025-02-24 PROCEDURE — 1036F TOBACCO NON-USER: CPT | Performed by: UROLOGY

## 2025-02-24 PROCEDURE — 3074F SYST BP LT 130 MM HG: CPT | Performed by: UROLOGY

## 2025-02-24 PROCEDURE — 3008F BODY MASS INDEX DOCD: CPT | Performed by: UROLOGY

## 2025-02-24 PROCEDURE — G2211 COMPLEX E/M VISIT ADD ON: HCPCS | Performed by: UROLOGY

## 2025-02-24 PROCEDURE — 1123F ACP DISCUSS/DSCN MKR DOCD: CPT | Performed by: UROLOGY

## 2025-02-24 PROCEDURE — 99213 OFFICE O/P EST LOW 20 MIN: CPT | Performed by: UROLOGY

## 2025-02-24 PROCEDURE — 1126F AMNT PAIN NOTED NONE PRSNT: CPT | Performed by: UROLOGY

## 2025-02-24 PROCEDURE — 1159F MED LIST DOCD IN RCRD: CPT | Performed by: UROLOGY

## 2025-02-24 PROCEDURE — 1160F RVW MEDS BY RX/DR IN RCRD: CPT | Performed by: UROLOGY

## 2025-02-24 PROCEDURE — 4010F ACE/ARB THERAPY RXD/TAKEN: CPT | Performed by: UROLOGY

## 2025-02-24 PROCEDURE — 3078F DIAST BP <80 MM HG: CPT | Performed by: UROLOGY

## 2025-02-24 ASSESSMENT — ENCOUNTER SYMPTOMS: FREQUENCY: 1

## 2025-02-24 ASSESSMENT — PAIN SCALES - GENERAL: PAINLEVEL_OUTOF10: 0-NO PAIN

## 2025-02-24 NOTE — PATIENT INSTRUCTIONS
Patient Discussion/Summary     It was great to see you once again today.  You are being well-maintained on daily Flomax in the morning, Hytrin at bedtime as well as oxybutynin at bedtime.  You have not had any recurrent urinary tract infections and your nighttime urination has decreased significantly.  Your urine studies showed no blood, no infection .  Your PSA was normal at 0.47.  I will see you again in 1 year.      This note was created with voice-recognition software and was not corrected for typographical or grammatical errors.

## 2025-02-24 NOTE — PROGRESS NOTES
Patient denies any pain today. Patient has not had any recent surgeries or hospital admits. Patient denies any concerns about falling or safety. Patient has occasional nocturia, urgency, and frequency (every 2 hours). Patient taking Flomax, Hytrin and Oxybutynin as directed. CV    Review of Systems   Genitourinary:  Positive for frequency and urgency.

## 2025-02-24 NOTE — PROGRESS NOTES
Provider Impressions     70 year-old  male. Originally seen in the emergency room for GROSS HEMATURIA. Urine CYTOLOGY was ATYPICAL. He had an Escherichia coli URINARY TRACT INFECTION. Retired. Positive family history for prostate cancer. 35-pack-year cigarette smoking history.       COUMADIN      02/01/13 CYSTOSCOPY with URODYNAMICS. Patchy erythema. Patient was initiated on Proscar and Toviaz 4 mg daily.     05/10/13, CYSTOSCOPY revealed no erythema and severe obstruction.     04/09/14 patient was switched from Proscar to Hytrin 1 mg by mouth each bedtime. NOCTURIA went from x4 to x1.     03/23/15, all urine studies negative. PSA is 0.5. Good flow rate 15 cc per second. Patient has no new complaints.     03/08/16, all urine studies are negative. PSA is 0.7. Excellent flow at 10 cc/s with a PVR of 0. Patient has no new complaints. The cystoscopy shows a severely obstructed prostatic urethra and median lobe. However, the patient continues to void well. He will return in 1 year.     02/07/17, patient has no new complaints. PSA 0.7. All urine studies are negative. Cystoscopy once again reveals a severely obstructed prostatic urethra and huge median lobe. Flow rate of 15 with a PVR of 86. We will continue with Toviaz at the 4 mg dose and Hytrin 1 mg daily at bedtime. He will return in 1 year.     05/21/17, insurance will no longer cover Toviaz, so Vesicare was ordered. Vesicare is covered however it is too expensive. Trospium X are was ordered.     02/06/18, patient with no complaints. PSA is now 0.8. All urine studies is negative. Cystoscopy shows improvement now with moderately obstructed prostatic urethra and moderately elevated median lobe. Flow rate of 9 with a PVR of 0. We will continue with Sanctura XR and Hytrin at the 1 mg dose daily at bedtime. He will return in 1 year. We will not perform another cystoscopy unless his condition changes.     02/13/19, patient states that he was quite ill with  flulike symptoms for her. Of 12 days, he was very weak and sought medical attention. He now returns with a urinary tract infection of Morganella. He is not exposed to nursing home's, and his wife did have a UTI concurrently, unknown organism. In any case, his repeat cultures negative. However flow rate is down to 7 with a PVR of 236. We will add Flomax to his regimen. He will continue on Hytrin nightly and Sanctura XR in the daytime for his urgency and frequency. However, upon his return in 3 months, if we do not see an improvement with Flomax, we will repeat a cystoscopy with urodynamics.     05/28/19, patient states that the addition of Flomax has made a significant improvement. Flow rate is now up to 11 with a PVR of only 72. He will continue on Flomax and Sanctura XR daily and Hytrin 1 mg at night. I will see him again in February.     03/11/20, telephone call, PSA 0.60, cytology negative, urinalysis negative, urine culture no growth.     02/02/21, patient arrives alone. He continues to have excellent response to the triple medication of daytime Flomax and Sanctura XR and nighttime Hytrin 1 mg. Good flow rate of 8 cc/s with a PVR of 0. Urinalysis, urine culture and urine cytology are all negative. PSA 0.5. He will return next February.     February 8, 2022, patient arrives with his wife. He continues to have excellent flow, no urinary frequency and minimal nocturia x2 with a combination of daily Flomax and Sanctura Exar in addition to nighttime Hytrin 1 mg. Today's flow rate 7 cc/s, total volume 61 cc, PVR 0 cc. Urinalysis and urine culture are negative. Urine cytology is pending. PSA again is 0.5. He will return in 1 year     June 30, 2022. Patient states by telephone that he is experiencing nocturia with incontinence x5. We ordered a urinalysis and urine culture and added oxybutynin IR 5 mg p.o. nightly.     July 2, 2022, flow rate 10 cc/s,  cc.     February 7, 2023, patient arrives with his wife  Katrin. No flow rate today, PVR of 299. PSA 0.50 and urine culture no growth. I suspect his prostate continues to enlarge with more obstruction. I am concerned about renal function. The addition of oxybutynin at night has made a difference with nocturia x6 now down to x3. Therefore, we will continue Flomax daily. Discontinue Sanctura, increase the Hytrin to 2 mg in the oxybutynin to ER 10 mg nightly. He will return for cystoscopy, flow studies and a discussion of treatment options. We will also obtain a creatinine. He may require minimally invasive prostate surgery.     April 4, 2023, cystoscopy, flow studies and a discussion of treatment options. Cystoscopy does reveal moderately obstructed prostatic urethra and mildly elevated median lobe. Trabeculated bladder. Patient states that his flow has decreased significantly and he is now experiencing incontinence. Flow rate of 6 cc/s, total volume 34 cc,  cc. He is maintained on daily Flomax, nighttime Hytrin 2 mg with oxybutynin IR 5 mg. Creatinine is normal at 1.20 with GFR 65. I will send him to see Dr. Nakul Bird for further evaluation and possible HoLEP procedure.     May 23, 2023, office visit, Dr. Nakul Bird.  Patient states he was emptying his bladder well with a PVR of only 39 cc.  No bothersome symptoms at that point and happy with current management.  No reason to pursue surgical intervention.     March 1, 2024, patient arrives with his wife Katrin.  He states that he now has urinary incontinence only at night.  He continues on daily Flomax and Hytrin.  However, he is independently discontinued the oxybutynin and states that is when the incontinence began.  He stated that his new insurance carrier refused to give him both Hytrin and oxybutynin at the same time.  We will reinstitute his routine.  Urinalysis is negative for blood.  Urine culture no growth.  Urine cytology is negative for malignant cells and PSA is normal at 0.66.  Today's flow rate 12  cc/s with a PVR of 137 cc.  We will reinstitute his medication regimen and have him return in a year.    February 24, 2025, patient arrives alone.  He states that since starting the regimen of Flomax in the daytime and Hytrin and oxybutynin ER at night, he no longer has recurrent UTIs and no longer experiences nocturia.  Today's  cc.  PSA 0.47.  Urinalysis is negative for blood and urine culture no growth.  He would like to return in 1 year.     PLAN:     #1 the patient will return In February 2026 with a post void residual, urine studies and a PSA     #2 the patient will be maintained on Hytrin 2 mg and oxybutynin ER 10 mg both by mouth each bedtime, addition to Flomax 0.4 mg by mouth daily     Physical Exam  Vitals and nursing note reviewed.   Constitutional:       Appearance: Normal appearance.   HENT:      Head: Normocephalic and atraumatic.   Pulmonary:      Effort: Pulmonary effort is normal.   Abdominal:      Palpations: Abdomen is soft.      Tenderness: There is no abdominal tenderness.   Musculoskeletal:         General: Normal range of motion.      Cervical back: Normal range of motion and neck supple.   Neurological:      General: No focal deficit present.      Mental Status: He is alert and oriented to person, place, and time.   Psychiatric:         Mood and Affect: Mood normal.         Behavior: Behavior normal.          This note was created with voice-recognition software and was not corrected for typographical or grammatical errors

## 2025-02-24 NOTE — LETTER
February 24, 2025     Livan Bartholomew DO  [address] [facility] [address]    Patient: Johnathan Akins   YOB: 1955   Date of Visit: 2/24/2025       Dear Dr. Livan Bartholomew DO:    Thank you for referring Johnathan Akins to me for evaluation. Below are my notes for this consultation.  If you have questions, please do not hesitate to call me. I look forward to following your patient along with you.       Sincerely,     Leo Patten MD      CC: No Recipients  ______________________________________________________________________________________         Provider Impressions     70 year-old  male. Originally seen in the emergency room for GROSS HEMATURIA. Urine CYTOLOGY was ATYPICAL. He had an Escherichia coli URINARY TRACT INFECTION. Retired. Positive family history for prostate cancer. 35-pack-year cigarette smoking history.       COUMADIN      02/01/13 CYSTOSCOPY with URODYNAMICS. Patchy erythema. Patient was initiated on Proscar and Toviaz 4 mg daily.     05/10/13, CYSTOSCOPY revealed no erythema and severe obstruction.     04/09/14 patient was switched from Proscar to Hytrin 1 mg by mouth each bedtime. NOCTURIA went from x4 to x1.     03/23/15, all urine studies negative. PSA is 0.5. Good flow rate 15 cc per second. Patient has no new complaints.     03/08/16, all urine studies are negative. PSA is 0.7. Excellent flow at 10 cc/s with a PVR of 0. Patient has no new complaints. The cystoscopy shows a severely obstructed prostatic urethra and median lobe. However, the patient continues to void well. He will return in 1 year.     02/07/17, patient has no new complaints. PSA 0.7. All urine studies are negative. Cystoscopy once again reveals a severely obstructed prostatic urethra and huge median lobe. Flow rate of 15 with a PVR of 86. We will continue with Toviaz at the 4 mg dose and Hytrin 1 mg daily at bedtime. He will return in 1 year.     05/21/17, insurance will no longer cover  Toviaz, so Vesicare was ordered. Vesicare is covered however it is too expensive. Trospium X are was ordered.     02/06/18, patient with no complaints. PSA is now 0.8. All urine studies is negative. Cystoscopy shows improvement now with moderately obstructed prostatic urethra and moderately elevated median lobe. Flow rate of 9 with a PVR of 0. We will continue with Sanctura XR and Hytrin at the 1 mg dose daily at bedtime. He will return in 1 year. We will not perform another cystoscopy unless his condition changes.     02/13/19, patient states that he was quite ill with flulike symptoms for her. Of 12 days, he was very weak and sought medical attention. He now returns with a urinary tract infection of Morganella. He is not exposed to nursing home's, and his wife did have a UTI concurrently, unknown organism. In any case, his repeat cultures negative. However flow rate is down to 7 with a PVR of 236. We will add Flomax to his regimen. He will continue on Hytrin nightly and Sanctura XR in the daytime for his urgency and frequency. However, upon his return in 3 months, if we do not see an improvement with Flomax, we will repeat a cystoscopy with urodynamics.     05/28/19, patient states that the addition of Flomax has made a significant improvement. Flow rate is now up to 11 with a PVR of only 72. He will continue on Flomax and Sanctura XR daily and Hytrin 1 mg at night. I will see him again in February.     03/11/20, telephone call, PSA 0.60, cytology negative, urinalysis negative, urine culture no growth.     02/02/21, patient arrives alone. He continues to have excellent response to the triple medication of daytime Flomax and Sanctura XR and nighttime Hytrin 1 mg. Good flow rate of 8 cc/s with a PVR of 0. Urinalysis, urine culture and urine cytology are all negative. PSA 0.5. He will return next February.     February 8, 2022, patient arrives with his wife. He continues to have excellent flow, no urinary frequency  and minimal nocturia x2 with a combination of daily Flomax and Sanctura Exar in addition to nighttime Hytrin 1 mg. Today's flow rate 7 cc/s, total volume 61 cc, PVR 0 cc. Urinalysis and urine culture are negative. Urine cytology is pending. PSA again is 0.5. He will return in 1 year     June 30, 2022. Patient states by telephone that he is experiencing nocturia with incontinence x5. We ordered a urinalysis and urine culture and added oxybutynin IR 5 mg p.o. nightly.     July 2, 2022, flow rate 10 cc/s,  cc.     February 7, 2023, patient arrives with his wife Katrin. No flow rate today, PVR of 299. PSA 0.50 and urine culture no growth. I suspect his prostate continues to enlarge with more obstruction. I am concerned about renal function. The addition of oxybutynin at night has made a difference with nocturia x6 now down to x3. Therefore, we will continue Flomax daily. Discontinue Sanctura, increase the Hytrin to 2 mg in the oxybutynin to ER 10 mg nightly. He will return for cystoscopy, flow studies and a discussion of treatment options. We will also obtain a creatinine. He may require minimally invasive prostate surgery.     April 4, 2023, cystoscopy, flow studies and a discussion of treatment options. Cystoscopy does reveal moderately obstructed prostatic urethra and mildly elevated median lobe. Trabeculated bladder. Patient states that his flow has decreased significantly and he is now experiencing incontinence. Flow rate of 6 cc/s, total volume 34 cc,  cc. He is maintained on daily Flomax, nighttime Hytrin 2 mg with oxybutynin IR 5 mg. Creatinine is normal at 1.20 with GFR 65. I will send him to see Dr. Nakul Bird for further evaluation and possible HoLEP procedure.     May 23, 2023, office visit, Dr. Nakul Bird.  Patient states he was emptying his bladder well with a PVR of only 39 cc.  No bothersome symptoms at that point and happy with current management.  No reason to pursue surgical  intervention.     March 1, 2024, patient arrives with his wife Katrin.  He states that he now has urinary incontinence only at night.  He continues on daily Flomax and Hytrin.  However, he is independently discontinued the oxybutynin and states that is when the incontinence began.  He stated that his new insurance carrier refused to give him both Hytrin and oxybutynin at the same time.  We will reinstitute his routine.  Urinalysis is negative for blood.  Urine culture no growth.  Urine cytology is negative for malignant cells and PSA is normal at 0.66.  Today's flow rate 12 cc/s with a PVR of 137 cc.  We will reinstitute his medication regimen and have him return in a year.    February 24, 2025, patient arrives alone.  He states that since starting the regimen of Flomax in the daytime and Hytrin and oxybutynin ER at night, he no longer has recurrent UTIs and no longer experiences nocturia.  Today's  cc.  PSA 0.47.  Urinalysis is negative for blood and urine culture no growth.  He would like to return in 1 year.     PLAN:     #1 the patient will return In February 2026 with a post void residual, urine studies and a PSA     #2 the patient will be maintained on Hytrin 2 mg and oxybutynin ER 10 mg both by mouth each bedtime, addition to Flomax 0.4 mg by mouth daily     Physical Exam  Vitals and nursing note reviewed.   Constitutional:       Appearance: Normal appearance.   HENT:      Head: Normocephalic and atraumatic.   Pulmonary:      Effort: Pulmonary effort is normal.   Abdominal:      Palpations: Abdomen is soft.      Tenderness: There is no abdominal tenderness.   Musculoskeletal:         General: Normal range of motion.      Cervical back: Normal range of motion and neck supple.   Neurological:      General: No focal deficit present.      Mental Status: He is alert and oriented to person, place, and time.   Psychiatric:         Mood and Affect: Mood normal.         Behavior: Behavior normal.          This  note was created with voice-recognition software and was not corrected for typographical or grammatical errors

## 2025-02-26 LAB
LABORATORY COMMENT REPORT: NORMAL
LABORATORY COMMENT REPORT: NORMAL
PATH REPORT.FINAL DX SPEC: NORMAL
PATH REPORT.GROSS SPEC: NORMAL
PATH REPORT.RELEVANT HX SPEC: NORMAL
PATH REPORT.TOTAL CANCER: NORMAL

## 2025-03-03 ENCOUNTER — APPOINTMENT (OUTPATIENT)
Dept: PRIMARY CARE | Facility: CLINIC | Age: 70
End: 2025-03-03
Payer: COMMERCIAL

## 2025-03-03 VITALS
HEART RATE: 76 BPM | BODY MASS INDEX: 41.37 KG/M2 | TEMPERATURE: 97.7 F | WEIGHT: 273 LBS | RESPIRATION RATE: 16 BRPM | HEIGHT: 68 IN | SYSTOLIC BLOOD PRESSURE: 112 MMHG | OXYGEN SATURATION: 98 % | DIASTOLIC BLOOD PRESSURE: 76 MMHG

## 2025-03-03 DIAGNOSIS — E11.9 TYPE 2 DIABETES MELLITUS WITHOUT COMPLICATION, WITHOUT LONG-TERM CURRENT USE OF INSULIN (MULTI): Primary | ICD-10-CM

## 2025-03-03 DIAGNOSIS — I48.0 PAROXYSMAL ATRIAL FIBRILLATION WITH RVR (MULTI): ICD-10-CM

## 2025-03-03 DIAGNOSIS — E11.22 CONTROLLED TYPE 2 DIABETES MELLITUS WITH STAGE 3 CHRONIC KIDNEY DISEASE, WITHOUT LONG-TERM CURRENT USE OF INSULIN (MULTI): ICD-10-CM

## 2025-03-03 DIAGNOSIS — I10 PRIMARY HYPERTENSION: ICD-10-CM

## 2025-03-03 DIAGNOSIS — E66.01 OBESITY, MORBID (MULTI): ICD-10-CM

## 2025-03-03 DIAGNOSIS — E78.00 HYPERCHOLESTEROLEMIA: ICD-10-CM

## 2025-03-03 DIAGNOSIS — N18.30 CONTROLLED TYPE 2 DIABETES MELLITUS WITH STAGE 3 CHRONIC KIDNEY DISEASE, WITHOUT LONG-TERM CURRENT USE OF INSULIN (MULTI): ICD-10-CM

## 2025-03-03 PROCEDURE — 3074F SYST BP LT 130 MM HG: CPT | Performed by: FAMILY MEDICINE

## 2025-03-03 PROCEDURE — 99213 OFFICE O/P EST LOW 20 MIN: CPT | Performed by: FAMILY MEDICINE

## 2025-03-03 PROCEDURE — 3008F BODY MASS INDEX DOCD: CPT | Performed by: FAMILY MEDICINE

## 2025-03-03 PROCEDURE — 1036F TOBACCO NON-USER: CPT | Performed by: FAMILY MEDICINE

## 2025-03-03 PROCEDURE — 1160F RVW MEDS BY RX/DR IN RCRD: CPT | Performed by: FAMILY MEDICINE

## 2025-03-03 PROCEDURE — 3078F DIAST BP <80 MM HG: CPT | Performed by: FAMILY MEDICINE

## 2025-03-03 PROCEDURE — 4010F ACE/ARB THERAPY RXD/TAKEN: CPT | Performed by: FAMILY MEDICINE

## 2025-03-03 PROCEDURE — 1123F ACP DISCUSS/DSCN MKR DOCD: CPT | Performed by: FAMILY MEDICINE

## 2025-03-03 PROCEDURE — 1159F MED LIST DOCD IN RCRD: CPT | Performed by: FAMILY MEDICINE

## 2025-03-03 NOTE — PROGRESS NOTES
"Subjective   Patient ID: Johnathan Akins is a 70 y.o. male who presents for Diabetes Mellitus (3 month follow up ).  HPI  -70year-old gentleman seen for a while with a history of diabetes cardiomyopathy remote atrial fibrillation hematuria chronic renal sufficiency 3 AA is here to recheck otherwise home sugars range from 120s to 165 depending on diet remains on 2.5 mg of glipizide before meals 3 times a day in addition to metformin gratefully his creatinine has been less than 1.5 so maintains on metformin  For his atrial fibs seen Dr. Montoya has him on 300 diltiazem as well as metoprolol twice a day --also takes Zocor 20 mg at bedtime Aldactone lisinopril.  Remains on Xarelto because of prior atrial fibs and has been irregular rhythm.  Flomax is followed out of the [facility] for hematuria last urine cytology was negative-- remains on amiodarone.  Review of Systems   Constitutional:  Positive for fatigue. Negative for fever and unexpected weight change.   Eyes:  Negative for visual disturbance.   Respiratory:  Negative for cough, chest tightness, shortness of breath and wheezing.    Cardiovascular:  Negative for chest pain, palpitations and leg swelling.   Gastrointestinal:  Negative for abdominal pain and blood in stool.   Genitourinary:  Positive for frequency. Negative for dysuria, flank pain and hematuria.   Musculoskeletal:  Positive for arthralgias and myalgias.   Skin:  Negative for rash.   Neurological:  Negative for syncope, weakness, light-headedness and headaches.   Psychiatric/Behavioral:  Negative for behavioral problems, confusion, decreased concentration, sleep disturbance and suicidal ideas.        Objective   /76 (BP Location: Right arm, Patient Position: Sitting, BP Cuff Size: Large adult)   Pulse 76   Temp 36.5 °C (97.7 °F) (Temporal)   Resp 16   Ht 1.727 m (5' 8\")   Wt 124 kg (273 lb)   SpO2 98%   BMI 41.51 kg/m²     Hemoglobin A1C  Order: [id number]   Status: Final result     "   Visible to patient: No (inaccessible in [facility])       Dx: Controlled type 2 diabetes mellitus w...    2 Result Notes       1  Topic            Component  Ref Range & Units 7 mo ago  (8/7/24) 11 mo ago  (3/20/24) 1 yr ago  (4/18/23) 2 yr ago  (4/8/22) 3 yr ago  (6/9/21) 5 yr ago  (11/6/19) 5 yr ago  (8/7/19)   Hemoglobin A1C  see below % 7.1 High  6.5 High  6.4 Abnormal  R, CM 6.2 Abnormal  R, CM 6.2 R, CM 5.9 R, CM 6.6 R, CM   Estimated Average Glucose  Not Established mg/dL                 ogy Consultation (Non-Gynecologic): [id number]  Order: [id number]   Collected 2/21/2025 14:41       Status: Final result       Visible to patient: No (inaccessible in [facility])       Dx: Retention of urine, unspecified    0 Result Notes       Component  Resulting Agency   Final Cytological Interpretation      A. URINE CLEAN CATCH, CYTOLOGY:                                       Clusters of urothelial cells lacking significant cytologic atypia; origin from a non-neoplastic process is favored                                                      te Specific Antigen  Order: [id number]   Status: Final result       Visible to patient: No (inaccessible in [facility])    0 Result Notes      Component  Ref Range & Units 2 wk ago   PSA, TOTAL  < OR = 4.00 ng/mL 0.47   Comment: The total PSA value from this assay system is  standardized against the WHO standard. The test  result will be approximately 20% lower when compared  to the equimolar-standardized total PSA (Pauly  Kalpesh). Comparison of         3 Result Notes            Component  Ref Range & Units 6 mo ago  (9/5/24) 6 mo ago  (8/17/24) 6 mo ago  (8/11/24) 6 mo ago  (8/10/24) 6 mo ago  (8/9/24) 7 mo ago  (8/8/24) 7 mo ago  (8/8/24)   Glucose  74 - 99 mg/dL 111 High  139 High  139 High  133 High  119 High  78 115 High    Sodium  136 - 145 mmol/L 134 Low  133 Low  133 Low  130 Low  134 Low  134 Low  135 Low    Potassium  3.5 - 5.3 mmol/L 5.1 4.7 4.6 4.4 4.2 4.4 4.7    Chloride  98 - 107 mmol/L 104 103 102 101 103 103 105   Bicarbonate  21 - 32 mmol/L 22 21 24 20 Low  25 22 22   Anion Gap  10 - 20 mmol/L 13 14 12 13 10 13 13   Urea Nitrogen  6 - 23 mg/dL 24 High  25 High  21 20 20 24 High  28 High    Creatinine  0.50 - 1.30 mg/dL 1.39 High  1.44 High  1.24 1.33 High  1.02 1.18 1.25   eGFR  >60 mL/min/1.73m*2 54 Low  52 Low  CM 62 CM 57 Low  CM 79 CM 66 CM 62 CM   Comment: Calculations of estimated GFR are performed using the 2021 CKD-EPI Study Refit equation without the race variable for the IDMS-Traceable creatinine methods.  https://jasn.asnjournals.org/content/early/2021/09/22/ASN.0906367012   Calcium  8.6 - 10.3 mg/dL 9.0 8.9 9.1 8.9 9.0 8.9 9.3   Resulting [facility]             Specimen Collected: 09/05/24 09:40   -type natriuretic peptide  Order: [id number]   Status: Final result       Visible to patient: No (inaccessible in [facility])       Dx: Cardiomyopathy, nonischemic (Multi)    3 Result Notes        Component  Ref Range & Units 6 mo ago  (9/5/24) 6 mo ago  (8/17/24) 7 mo ago  (8/8/24)   BNP  0 - 99 pg/mL 55 31 63   Resulting [facility]              Interpretation Summary  Show Result ComparisonAccelerated Junctional rhythm  Abnormal ECG  When compared with ECG of 17-AUG-2024 22:28, (unconfirmed)  Junctional rhythm has replaced Atrial fibrillation  See ED provider note for full interpretation and clinical correlation  Confirmed by Jhon Tavera  Performed by: [facility]     <100 pg/mL - Heart failure unlikely  100-299 pg/mL - Intermediate probability of acute heart                  failure exac        Signed           Chief Complaint   Patient presents with    Atrial Fibrillation    Cardiomyopathy    Hypertension    6 Month Follow-Up         Patient presents for follow up medical evaluation. Patient is followed on a regular basis by Dr. Livan Bartholomew DO. Patient was hospitalized in 9/12 with dyspnea, palpitations and fatigue. Was  noted to have an EF of 20% on echo with mild MR. Cardiac cath performed showed normal coronaries. Patient was also noted to have have new onset atrial fibb and was subsequently cardioverted. Doing well on coumadin, no bleeding issues. Pt denies chest pain, dyspnea, dyspnea on exertion, change in exercise capacity, fatigue, nausea, vomiting, diarrhea, constipation, motor weakness, insomnia, weight loss, syncope, dizziness, lightheadedness, palpitations, PND, orthopnea, or claudication. States he can walk a mile for warmup, and exercises on a regular basis at [facility].      4-29-13: as above, s/p Echo now with normal LVF, EF 55-60%, aortic root measuring 3.7cm. LE ANTONETTE/PVR are normal. LE venous US shows significant 3-4 sec reflux in lesser saphenous vein. Doing well overall, has lost 40 pounds.      9-23-13: as above, patient noticed palpitations about a week ago, EKG in office today shows afibb with RVR, HR of 163bpm. Has resumed drinking coffee. Had a recent URI/cough, no OTC stimulants. Pt denies chest pain, dyspnea, dyspnea on exertion, change in exercise capacity, fatigue, nausea, vomiting, diarrhea, constipation, motor weakness, insomnia, weight loss, syncope, dizziness, lightheadedness, PND, orthopnea, or claudication. Compliant with meds.      10-10-13: as above, s/p elective CV with successful conversion into NSR but EKG in office today shows Afibb with RVR HR of 161bpm. Compliant with meds, no caffeine. No hx of sleep study. Has diaphoresis but not SOB/CP or syncope.      10-21-13: as above, s/p Sotalol loading and CV, EKG shows sinus petrona with HR of 54, QTC of 400ms. Pt denies chest pain, dyspnea, dyspnea on exertion, change in exercise capacity, fatigue, nausea, vomiting, diarrhea, constipation, motor weakness, insomnia, weight loss, syncope, dizziness, lightheadedness, palpitations, PND, orthopnea, or claudication. Will need sleep study     11-11-13: as above, s/p abnormal sleep study with  moderate VIJAY. S/p echo but results are not available. Will see Dr. Paris in December. Pt denies chest pain, dyspnea, dyspnea on exertion, change in exercise capacity, fatigue,  nausea, vomiting, diarrhea, constipation, motor weakness, insomnia, weight loss, syncope, dizziness, lightheadedness, palpitations, PND, orthopnea, or claudication. No bleeding issues. Working out at gym.      5-15-14: as above, doing well overall. Pt denies chest pain, dyspnea, dyspnea on exertion, change in exercise capacity, fatigue,  nausea, vomiting, diarrhea, constipation, motor weakness, insomnia, weight loss, syncope, dizziness, lightheadedness, palpitations, PND, orthopnea, or claudication. EKG with NSR, . No recent hospitalizations. NO CPAP follow up. Exercising.      4-21-15: as above, patient felt he was going into atrial fibb 4 days ago. EKG in office today shows Afibb with RVR at 127bpm, . Patient is on Sotalol, compliant with meds. No bleeding issues on coumadin.      1-18-18: s/p afibb ablation with dr. Gunter in 6/2017. EKG in NSR today. Was taken off of OAC. No on any antiarrhythmics. Does have GERD type symptoms and started on a PPI. Does not wear a cpap. Compliant with meds. Pt denies chest pain, dyspnea, dyspnea on exertion, change in exercise capacity, fatigue,  nausea, vomiting, diarrhea, constipation, motor weakness, insomnia, weight loss, syncope, dizziness, lightheadedness, palpitations, PND, orthopnea.  No nitro use. BP and hr are good. CAD is stable. No LE discoloration or ulcers. +  LE edema. No CHF type symptoms. Lipid profile is normal. Labs reviewed and WNL. No smoking, quit 20 yrs ago. Lives a sedentary lifestyle.         7-17-18: s/p repeat Afibb ablation at [facility]. He is on Tikosyn now and Pradaxa. No bleeding issues. EKG with NSR, normal QTC. Pt denies chest pain, dyspnea, dyspnea on exertion, change in exercise capacity, fatigue,  nausea, vomiting, diarrhea, constipation, motor weakness,  insomnia, weight loss, syncope, dizziness, lightheadedness, palpitations, PND, orthopnea, or claudication. No nitro use. BP and hr are good. CAD is stable. No LE discoloration or ulcers. No LE edema. No CHF type symptoms. Lipid profile is normal. No recent hospitalization. No change in meds. S/p ECHO in 5/18 with EF of 55%< mod pericardial effusion that resolved with repeat check a few weeks later.      2-14-20: he is on Pradaxa. Off of Tikosyn. Following with dr. Paris. Pt denies chest pain, dyspnea, dyspnea on exertion, change in exercise capacity, fatigue,  nausea, vomiting, diarrhea, constipation, motor weakness, insomnia, weight loss, syncope, dizziness, lightheadedness, palpitations, PND, orthopnea, or claudication. No nitro use. BP and hr are good. CAD is stable. No LE discoloration or ulcers. No LE edema. No CHF type symptoms. Lipid profile is normal. No recent hospitalization. No change in meds. No hx of VIJAY. EKG with NSR, normal QTC.   HGBA1C is 5.9        2/5/2021: Patient is doing well overall.  He denies any angina or CHF type symptoms.  Has a history of paroxysmal atrial fibrillation status post ablation at [facility] he is off of Tikosyn at this time.  He remains on oral anticoagulation.  Blood pressure is 110/80 heart rate of 60 bpm.  Lipid profile was normal no recent hospitalizations or change in his cardiac medications.  Previous cardiac catheterization 2013 was negative for any significant coronary artery disease.  Last echo in 2018 showed no evidence of any pericardial fusion.  Normal LV function.  Hemoglobin A1c is 5.9.  Diabetes under very good control.  Smoking  EKG with normal sinus rhythm, normal QTc interval.     8-26-21: Pt denies chest pain, dyspnea, dyspnea on exertion, change in exercise capacity, fatigue,  nausea, vomiting, diarrhea, constipation, motor weakness, insomnia, weight loss, syncope, dizziness, lightheadedness, palpitations, PND, orthopnea, or claudication. No nitro use. BP and  hr are good. CAD is stable. No LE discoloration or ulcers. No LE edema. No CHF type symptoms. Lipid profile is normal. No recent hospitalization. No change in meds. No smoking.    Has a history of paroxysmal atrial fibrillation status post ablation at [facility] he is off of Tikosyn at this time.  He remains on oral anticoagulation.  EKG with NSR, no ischemia.      5-6-22: hx of Pafib s/p ablation at [facility]. On DOAC/pradaxa. Not on any antiarrhythmics. Pt denies chest pain, dyspnea, dyspnea on exertion, change in exercise capacity, fatigue,  nausea, vomiting, diarrhea, constipation, motor weakness, insomnia, weight loss, syncope, dizziness, lightheadedness, palpitations, PND, orthopnea, or claudication. No smoking.   EKG with normal sinus rhythm normal QTc interval.     2-3-23: hx of Pafib s/p ablation at [facility]. On DOAC/pradaxa. Not on any antiarrhythmics. Pt denies chest pain, dyspnea, dyspnea on exertion, change in exercise capacity, fatigue,  nausea, vomiting, diarrhea, constipation, motor weakness, insomnia, weight loss, syncope, dizziness, lightheadedness, palpitations, PND, orthopnea, or claudication. No smoking.   EKG with NSR< no ischemia.      12-8-23: hx of Pafib s/p ablation at [facility]. On DOAC/pradaxa. Not on any antiarrhythmics. Pt denies chest pain, dyspnea, dyspnea on exertion, change in exercise capacity, fatigue,  nausea, vomiting, diarrhea, constipation, motor weakness, insomnia, weight loss, syncope, dizziness, lightheadedness, palpitations, PND, orthopnea, or claudication. No smoking.  EKG with NSR, no ischemia.         8/30/2024: Status post hospitalization due to left leg hematoma.  Patient was also noted to be in A-fib with RVR which was difficult to control.  Patient was placed on amiodarone 200 mg daily, Cardizem CD3 100 mg daily, metoprolol 75 mg twice daily  Patient is self monitoring with RetAPPs and states he is in normal sinus rhythm  Heart rate is controlled today at 67 bpm.  Patient with  history of paroxysmal atrial fibrillation status post ablation at [facility] approximately 5 years ago.  He is back on Xarelto, no bleeding issues.  His left leg is feeling better.  Status post echocardiogram on 8/9/2024 showing ejection fraction of 50 to 55%, mild mitral regurgitation, mild tricuspid regurgitation.        2-28-25: hx of Pafivb. On amiodarone on DOAC. Patient with history of paroxysmal atrial fibrillation status post ablation at [facility] approximately 5 years ago.  Blood pressure is well-controlled.  Patient having some back issues and following with [facility]NR.  Most recent echo in 2024 showed normal LV function EF of 55% mild MR mild tricuspid regurgitation.  He does not really sense atrial fibrillation at this time  EKG with atrial fibrillation heart rate of 60 bpm         Patient Active Problem List                 Physical Exam  Reviewed and normal good pulse ox 98% and heart regular at 76-78  General Inspection reveals an obese individual in no acute distress  Neck supple no mass adenopathy bruits rigidity  Cardiovascular grade 1/2 of a 6 systolic murmur at the lower apex but otherwise no ectopy today and no gallop  Pulmonary bronchial breathing noted otherwise no wheezing rales or rhonchi  Abdominal no organomegaly mass rebound in mid upper abdomen no CVA tenderness no suprapubic fullness  Peripheral vascular distal pulses slightly reduced otherwise symmetrical slightly reduced sensation to vibration in the feet but otherwise no motor deficits noted.  Skin no rash petechiae or jaundice      Assessment/Plan   Problem List Items Addressed This Visit    None  Shows regular sinus rhythm  Incomplete right bundle branch block  Intervals normal for urine albumin as well as A1c CMP which will be done in about 2 months follow-up with Dr. Patten and will continue his Hytrin at nighttime  Continue follow-up with Dr. Montoya on his multiple cardiac medicines as noted above continue his Xarelto with prior atrial  fibs  Maintain low salt low carbohydrate low-fat diet and his above-mentioned glipizide 3 times a day and metformin if rising creatinine and will need to reduce metformin accordingly.  Increasing liquids strongly urged as well as weight reduction.  Otherwise clinically stable asymptomatic otology is been stable  No acute T or ST wave changes  Otherwise normal sinus rhythm  @discharge  The above diagnosis and treatment plan was discussed with the patient patient will continue appropriate diet and exercise as reviewed  Patient will recheck earlier if any interval problems of significance or clinical worsening of the above problems.  Agrees above surveillance.  All question were addressed regarding above meds

## 2025-03-06 ASSESSMENT — ENCOUNTER SYMPTOMS
HEMATURIA: 0
WEAKNESS: 0
ABDOMINAL PAIN: 0
MYALGIAS: 1
ARTHRALGIAS: 1
DYSURIA: 0
SHORTNESS OF BREATH: 0
CONFUSION: 0
DECREASED CONCENTRATION: 0
LIGHT-HEADEDNESS: 0
WHEEZING: 0
UNEXPECTED WEIGHT CHANGE: 0
BLOOD IN STOOL: 0
HEADACHES: 0
PALPITATIONS: 0
FEVER: 0
FLANK PAIN: 0
FREQUENCY: 1
FATIGUE: 1
COUGH: 0
SLEEP DISTURBANCE: 0
CHEST TIGHTNESS: 0

## 2025-03-07 NOTE — ASSESSMENT & PLAN NOTE
Improvement although still 3A kidney disease.  Increasing liquid strongly urged and is followed by urology on a regular basis recheck kidney functions in 2 months   patient

## 2025-03-25 DIAGNOSIS — N18.30 CONTROLLED TYPE 2 DIABETES MELLITUS WITH STAGE 3 CHRONIC KIDNEY DISEASE, WITHOUT LONG-TERM CURRENT USE OF INSULIN (MULTI): ICD-10-CM

## 2025-03-25 DIAGNOSIS — I10 PRIMARY HYPERTENSION: ICD-10-CM

## 2025-03-25 DIAGNOSIS — E11.22 CONTROLLED TYPE 2 DIABETES MELLITUS WITH STAGE 3 CHRONIC KIDNEY DISEASE, WITHOUT LONG-TERM CURRENT USE OF INSULIN (MULTI): ICD-10-CM

## 2025-03-25 RX ORDER — GLIPIZIDE 2.5 MG/1
TABLET, EXTENDED RELEASE ORAL
Qty: 360 TABLET | Refills: 3 | Status: SHIPPED | OUTPATIENT
Start: 2025-03-25

## 2025-03-25 RX ORDER — METOPROLOL TARTRATE 50 MG/1
50 TABLET ORAL 2 TIMES DAILY
Qty: 180 TABLET | Refills: 3 | Status: SHIPPED | OUTPATIENT
Start: 2025-03-25

## 2025-03-25 NOTE — TELEPHONE ENCOUNTER
Rx Refill Request     Name: Johnathan Akins  :  1955   Medication Name:  glipiZIDE XL (Glucotrol XL) 2.5 mg 24 hr tablet, metoprolol tartrate (Lopressor) 50 mg tablet   Specific Pharmacy location:  [place], [place]   Date of last appointment:  3/3/2025   Date of next appointment:  Visit date not found   Best number to reach patient:  [phone]

## 2025-04-02 DIAGNOSIS — M54.50 LUMBAR PAIN: ICD-10-CM

## 2025-04-02 RX ORDER — CYCLOBENZAPRINE HCL 5 MG
TABLET ORAL
Qty: 90 TABLET | Refills: 3 | Status: SHIPPED | OUTPATIENT
Start: 2025-04-02

## 2025-04-02 NOTE — TELEPHONE ENCOUNTER
Rx Refill Request Telephone Encounter    Name:  Johnathan Akins  :  [id number]  Medication Name:  cyclobenzaprine (Flexeril) 5 mg tablet     Specific Pharmacy location:  [facility]   Date of last appointment:  3/3/25  Date of next appointment:  N/A  Best number to reach patient:  [phone]

## 2025-06-01 ENCOUNTER — APPOINTMENT (OUTPATIENT)
Dept: RADIOLOGY | Facility: HOSPITAL | Age: 70
End: 2025-06-01
Payer: MEDICARE

## 2025-06-01 ENCOUNTER — APPOINTMENT (OUTPATIENT)
Dept: CARDIOLOGY | Facility: HOSPITAL | Age: 70
End: 2025-06-01
Payer: MEDICARE

## 2025-06-01 ENCOUNTER — HOSPITAL ENCOUNTER (OUTPATIENT)
Facility: HOSPITAL | Age: 70
Setting detail: OBSERVATION
End: 2025-06-01
Attending: STUDENT IN AN ORGANIZED HEALTH CARE EDUCATION/TRAINING PROGRAM | Admitting: HOSPITALIST
Payer: MEDICARE

## 2025-06-01 VITALS
WEIGHT: 266.76 LBS | DIASTOLIC BLOOD PRESSURE: 67 MMHG | HEIGHT: 68 IN | SYSTOLIC BLOOD PRESSURE: 126 MMHG | TEMPERATURE: 98.6 F | HEART RATE: 81 BPM | OXYGEN SATURATION: 94 % | BODY MASS INDEX: 40.43 KG/M2 | RESPIRATION RATE: 19 BRPM

## 2025-06-01 DIAGNOSIS — R33.9 RETENTION OF URINE: ICD-10-CM

## 2025-06-01 DIAGNOSIS — E87.5 HYPERKALEMIA: ICD-10-CM

## 2025-06-01 DIAGNOSIS — N17.9 AKI (ACUTE KIDNEY INJURY): Primary | ICD-10-CM

## 2025-06-01 LAB
ALBUMIN SERPL BCP-MCNC: 3.8 G/DL (ref 3.4–5)
ALP SERPL-CCNC: 77 U/L (ref 33–136)
ALT SERPL W P-5'-P-CCNC: 24 U/L (ref 10–52)
ANION GAP SERPL CALC-SCNC: 13 MMOL/L (ref 10–20)
APPEARANCE UR: CLEAR
AST SERPL W P-5'-P-CCNC: 19 U/L (ref 9–39)
ATRIAL RATE: 267 BPM
BASOPHILS # BLD AUTO: 0.01 X10*3/UL (ref 0–0.1)
BASOPHILS NFR BLD AUTO: 0.2 %
BILIRUB SERPL-MCNC: 0.4 MG/DL (ref 0–1.2)
BILIRUB UR STRIP.AUTO-MCNC: NEGATIVE MG/DL
BNP SERPL-MCNC: 25 PG/ML (ref 0–99)
BUN SERPL-MCNC: 40 MG/DL (ref 6–23)
CALCIUM SERPL-MCNC: 8.6 MG/DL (ref 8.6–10.3)
CARDIAC TROPONIN I PNL SERPL HS: 4 NG/L (ref 0–20)
CARDIAC TROPONIN I PNL SERPL HS: 4 NG/L (ref 0–20)
CHLORIDE SERPL-SCNC: 104 MMOL/L (ref 98–107)
CO2 SERPL-SCNC: 22 MMOL/L (ref 21–32)
COLOR UR: NORMAL
CREAT SERPL-MCNC: 2.47 MG/DL (ref 0.5–1.3)
EGFRCR SERPLBLD CKD-EPI 2021: 27 ML/MIN/1.73M*2
EOSINOPHIL # BLD AUTO: 0.03 X10*3/UL (ref 0–0.4)
EOSINOPHIL NFR BLD AUTO: 0.5 %
ERYTHROCYTE [DISTWIDTH] IN BLOOD BY AUTOMATED COUNT: 15.6 % (ref 11.5–14.5)
EST. AVERAGE GLUCOSE BLD GHB EST-MCNC: 128 MG/DL
FLUAV RNA RESP QL NAA+PROBE: NOT DETECTED
FLUBV RNA RESP QL NAA+PROBE: NOT DETECTED
GLUCOSE BLD MANUAL STRIP-MCNC: 59 MG/DL (ref 74–99)
GLUCOSE BLD MANUAL STRIP-MCNC: 77 MG/DL (ref 74–99)
GLUCOSE BLD MANUAL STRIP-MCNC: 78 MG/DL (ref 74–99)
GLUCOSE BLD MANUAL STRIP-MCNC: 95 MG/DL (ref 74–99)
GLUCOSE SERPL-MCNC: 166 MG/DL (ref 74–99)
GLUCOSE UR STRIP.AUTO-MCNC: NORMAL MG/DL
HBA1C MFR BLD: 6.1 % (ref ?–5.7)
HCT VFR BLD AUTO: 36.2 % (ref 41–52)
HGB BLD-MCNC: 11.8 G/DL (ref 13.5–17.5)
IMM GRANULOCYTES # BLD AUTO: 0.03 X10*3/UL (ref 0–0.5)
IMM GRANULOCYTES NFR BLD AUTO: 0.5 % (ref 0–0.9)
KETONES UR STRIP.AUTO-MCNC: NEGATIVE MG/DL
LEUKOCYTE ESTERASE UR QL STRIP.AUTO: NEGATIVE
LYMPHOCYTES # BLD AUTO: 1.04 X10*3/UL (ref 0.8–3)
LYMPHOCYTES NFR BLD AUTO: 18.7 %
MAGNESIUM SERPL-MCNC: 1.64 MG/DL (ref 1.6–2.4)
MCH RBC QN AUTO: 28.4 PG (ref 26–34)
MCHC RBC AUTO-ENTMCNC: 32.6 G/DL (ref 32–36)
MCV RBC AUTO: 87 FL (ref 80–100)
MONOCYTES # BLD AUTO: 0.41 X10*3/UL (ref 0.05–0.8)
MONOCYTES NFR BLD AUTO: 7.4 %
NEUTROPHILS # BLD AUTO: 4.03 X10*3/UL (ref 1.6–5.5)
NEUTROPHILS NFR BLD AUTO: 72.7 %
NITRITE UR QL STRIP.AUTO: NEGATIVE
NRBC BLD-RTO: 0 /100 WBCS (ref 0–0)
PH UR STRIP.AUTO: 5 [PH]
PLATELET # BLD AUTO: 163 X10*3/UL (ref 150–450)
POTASSIUM SERPL-SCNC: 5.5 MMOL/L (ref 3.5–5.3)
POTASSIUM SERPL-SCNC: 5.5 MMOL/L (ref 3.5–5.3)
PROT SERPL-MCNC: 6.9 G/DL (ref 6.4–8.2)
PROT UR STRIP.AUTO-MCNC: NEGATIVE MG/DL
Q ONSET: 213 MS
QRS COUNT: 9 BEATS
QRS DURATION: 90 MS
QT INTERVAL: 396 MS
QTC CALCULATION(BAZETT): 388 MS
QTC FREDERICIA: 391 MS
R AXIS: -9 DEGREES
RBC # BLD AUTO: 4.15 X10*6/UL (ref 4.5–5.9)
RBC # UR STRIP.AUTO: NEGATIVE MG/DL
SARS-COV-2 RNA RESP QL NAA+PROBE: NOT DETECTED
SODIUM SERPL-SCNC: 133 MMOL/L (ref 136–145)
SP GR UR STRIP.AUTO: 1.01
T AXIS: -11 DEGREES
T OFFSET: 411 MS
UROBILINOGEN UR STRIP.AUTO-MCNC: NORMAL MG/DL
VENTRICULAR RATE: 58 BPM
WBC # BLD AUTO: 5.6 X10*3/UL (ref 4.4–11.3)

## 2025-06-01 PROCEDURE — 70450 CT HEAD/BRAIN W/O DYE: CPT

## 2025-06-01 PROCEDURE — 71045 X-RAY EXAM CHEST 1 VIEW: CPT | Performed by: RADIOLOGY

## 2025-06-01 PROCEDURE — 51798 US URINE CAPACITY MEASURE: CPT

## 2025-06-01 PROCEDURE — 70450 CT HEAD/BRAIN W/O DYE: CPT | Performed by: RADIOLOGY

## 2025-06-01 PROCEDURE — 84484 ASSAY OF TROPONIN QUANT: CPT | Performed by: STUDENT IN AN ORGANIZED HEALTH CARE EDUCATION/TRAINING PROGRAM

## 2025-06-01 PROCEDURE — 82947 ASSAY GLUCOSE BLOOD QUANT: CPT | Mod: 59

## 2025-06-01 PROCEDURE — 83036 HEMOGLOBIN GLYCOSYLATED A1C: CPT | Mod: ELYLAB | Performed by: HOSPITALIST

## 2025-06-01 PROCEDURE — 83880 ASSAY OF NATRIURETIC PEPTIDE: CPT | Performed by: STUDENT IN AN ORGANIZED HEALTH CARE EDUCATION/TRAINING PROGRAM

## 2025-06-01 PROCEDURE — 36415 COLL VENOUS BLD VENIPUNCTURE: CPT | Performed by: STUDENT IN AN ORGANIZED HEALTH CARE EDUCATION/TRAINING PROGRAM

## 2025-06-01 PROCEDURE — 2500000002 HC RX 250 W HCPCS SELF ADMINISTERED DRUGS (ALT 637 FOR MEDICARE OP, ALT 636 FOR OP/ED): Performed by: STUDENT IN AN ORGANIZED HEALTH CARE EDUCATION/TRAINING PROGRAM

## 2025-06-01 PROCEDURE — 87636 SARSCOV2 & INF A&B AMP PRB: CPT | Performed by: STUDENT IN AN ORGANIZED HEALTH CARE EDUCATION/TRAINING PROGRAM

## 2025-06-01 PROCEDURE — 96367 TX/PROPH/DG ADDL SEQ IV INF: CPT

## 2025-06-01 PROCEDURE — 71045 X-RAY EXAM CHEST 1 VIEW: CPT

## 2025-06-01 PROCEDURE — 99291 CRITICAL CARE FIRST HOUR: CPT | Mod: 25 | Performed by: STUDENT IN AN ORGANIZED HEALTH CARE EDUCATION/TRAINING PROGRAM

## 2025-06-01 PROCEDURE — G0378 HOSPITAL OBSERVATION PER HR: HCPCS

## 2025-06-01 PROCEDURE — 96366 THER/PROPH/DIAG IV INF ADDON: CPT

## 2025-06-01 PROCEDURE — 99223 1ST HOSP IP/OBS HIGH 75: CPT | Performed by: HOSPITALIST

## 2025-06-01 PROCEDURE — 96361 HYDRATE IV INFUSION ADD-ON: CPT

## 2025-06-01 PROCEDURE — 2500000005 HC RX 250 GENERAL PHARMACY W/O HCPCS: Performed by: STUDENT IN AN ORGANIZED HEALTH CARE EDUCATION/TRAINING PROGRAM

## 2025-06-01 PROCEDURE — 96365 THER/PROPH/DIAG IV INF INIT: CPT

## 2025-06-01 PROCEDURE — 81003 URINALYSIS AUTO W/O SCOPE: CPT | Performed by: STUDENT IN AN ORGANIZED HEALTH CARE EDUCATION/TRAINING PROGRAM

## 2025-06-01 PROCEDURE — 2500000004 HC RX 250 GENERAL PHARMACY W/ HCPCS (ALT 636 FOR OP/ED): Performed by: STUDENT IN AN ORGANIZED HEALTH CARE EDUCATION/TRAINING PROGRAM

## 2025-06-01 PROCEDURE — 83735 ASSAY OF MAGNESIUM: CPT | Performed by: STUDENT IN AN ORGANIZED HEALTH CARE EDUCATION/TRAINING PROGRAM

## 2025-06-01 PROCEDURE — 96375 TX/PRO/DX INJ NEW DRUG ADDON: CPT

## 2025-06-01 PROCEDURE — 80053 COMPREHEN METABOLIC PANEL: CPT | Performed by: STUDENT IN AN ORGANIZED HEALTH CARE EDUCATION/TRAINING PROGRAM

## 2025-06-01 PROCEDURE — 85025 COMPLETE CBC W/AUTO DIFF WBC: CPT | Performed by: STUDENT IN AN ORGANIZED HEALTH CARE EDUCATION/TRAINING PROGRAM

## 2025-06-01 PROCEDURE — 84132 ASSAY OF SERUM POTASSIUM: CPT | Mod: 59 | Performed by: STUDENT IN AN ORGANIZED HEALTH CARE EDUCATION/TRAINING PROGRAM

## 2025-06-01 PROCEDURE — 93005 ELECTROCARDIOGRAM TRACING: CPT

## 2025-06-01 RX ORDER — ACETAMINOPHEN 325 MG/1
650 TABLET ORAL EVERY 4 HOURS PRN
Status: DISCONTINUED | OUTPATIENT
Start: 2025-06-01 | End: 2025-06-02 | Stop reason: HOSPADM

## 2025-06-01 RX ORDER — TALC
3 POWDER (GRAM) TOPICAL NIGHTLY PRN
Status: DISCONTINUED | OUTPATIENT
Start: 2025-06-01 | End: 2025-06-02 | Stop reason: HOSPADM

## 2025-06-01 RX ORDER — MAGNESIUM SULFATE HEPTAHYDRATE 40 MG/ML
2 INJECTION, SOLUTION INTRAVENOUS ONCE
Status: COMPLETED | OUTPATIENT
Start: 2025-06-01 | End: 2025-06-01

## 2025-06-01 RX ORDER — CALCIUM GLUCONATE 20 MG/ML
2 INJECTION, SOLUTION INTRAVENOUS ONCE
Status: COMPLETED | OUTPATIENT
Start: 2025-06-01 | End: 2025-06-01

## 2025-06-01 RX ORDER — TROSPIUM CHLORIDE 20 MG/1
20 TABLET, FILM COATED ORAL DAILY
COMMUNITY

## 2025-06-01 RX ORDER — ACETAMINOPHEN 650 MG/1
650 SUPPOSITORY RECTAL EVERY 4 HOURS PRN
Status: DISCONTINUED | OUTPATIENT
Start: 2025-06-01 | End: 2025-06-02 | Stop reason: HOSPADM

## 2025-06-01 RX ORDER — POLYETHYLENE GLYCOL 3350 17 G/17G
17 POWDER, FOR SOLUTION ORAL DAILY PRN
Status: DISCONTINUED | OUTPATIENT
Start: 2025-06-01 | End: 2025-06-02 | Stop reason: HOSPADM

## 2025-06-01 RX ORDER — INSULIN LISPRO 100 [IU]/ML
0-5 INJECTION, SOLUTION INTRAVENOUS; SUBCUTANEOUS
Status: DISCONTINUED | OUTPATIENT
Start: 2025-06-01 | End: 2025-06-02 | Stop reason: HOSPADM

## 2025-06-01 RX ORDER — ACETAMINOPHEN 160 MG/5ML
650 SOLUTION ORAL EVERY 4 HOURS PRN
Status: DISCONTINUED | OUTPATIENT
Start: 2025-06-01 | End: 2025-06-02 | Stop reason: HOSPADM

## 2025-06-01 RX ORDER — DEXTROSE 50 % IN WATER (D50W) INTRAVENOUS SYRINGE
25 ONCE
Status: COMPLETED | OUTPATIENT
Start: 2025-06-01 | End: 2025-06-01

## 2025-06-01 RX ORDER — ONDANSETRON HYDROCHLORIDE 2 MG/ML
4 INJECTION, SOLUTION INTRAVENOUS EVERY 8 HOURS PRN
Status: DISCONTINUED | OUTPATIENT
Start: 2025-06-01 | End: 2025-06-02 | Stop reason: HOSPADM

## 2025-06-01 RX ORDER — ONDANSETRON 4 MG/1
4 TABLET, FILM COATED ORAL EVERY 8 HOURS PRN
Status: DISCONTINUED | OUTPATIENT
Start: 2025-06-01 | End: 2025-06-02 | Stop reason: HOSPADM

## 2025-06-01 RX ADMIN — INSULIN HUMAN 5 UNITS: 100 INJECTION, SOLUTION PARENTERAL at 14:34

## 2025-06-01 RX ADMIN — CALCIUM GLUCONATE 2 G: 20 INJECTION, SOLUTION INTRAVENOUS at 14:22

## 2025-06-01 RX ADMIN — SODIUM CHLORIDE, POTASSIUM CHLORIDE, SODIUM LACTATE AND CALCIUM CHLORIDE 1000 ML: 600; 310; 30; 20 INJECTION, SOLUTION INTRAVENOUS at 15:06

## 2025-06-01 RX ADMIN — SODIUM CHLORIDE, POTASSIUM CHLORIDE, SODIUM LACTATE AND CALCIUM CHLORIDE 1000 ML: 600; 310; 30; 20 INJECTION, SOLUTION INTRAVENOUS at 13:17

## 2025-06-01 RX ADMIN — DEXTROSE MONOHYDRATE 25 G: 25 INJECTION, SOLUTION INTRAVENOUS at 14:34

## 2025-06-01 RX ADMIN — MAGNESIUM SULFATE HEPTAHYDRATE 2 G: 40 INJECTION, SOLUTION INTRAVENOUS at 14:42

## 2025-06-01 SDOH — SOCIAL STABILITY: SOCIAL INSECURITY: WITHIN THE LAST YEAR, HAVE YOU BEEN HUMILIATED OR EMOTIONALLY ABUSED IN OTHER WAYS BY YOUR PARTNER OR EX-PARTNER?: NO

## 2025-06-01 SDOH — ECONOMIC STABILITY: FOOD INSECURITY: WITHIN THE PAST 12 MONTHS, YOU WORRIED THAT YOUR FOOD WOULD RUN OUT BEFORE YOU GOT THE MONEY TO BUY MORE.: NEVER TRUE

## 2025-06-01 SDOH — SOCIAL STABILITY: SOCIAL INSECURITY: DOES ANYONE TRY TO KEEP YOU FROM HAVING/CONTACTING OTHER FRIENDS OR DOING THINGS OUTSIDE YOUR HOME?: NO

## 2025-06-01 SDOH — ECONOMIC STABILITY: INCOME INSECURITY: IN THE PAST 12 MONTHS HAS THE ELECTRIC, GAS, OIL, OR WATER COMPANY THREATENED TO SHUT OFF SERVICES IN YOUR HOME?: NO

## 2025-06-01 SDOH — SOCIAL STABILITY: SOCIAL INSECURITY
WITHIN THE LAST YEAR, HAVE YOU BEEN KICKED, HIT, SLAPPED, OR OTHERWISE PHYSICALLY HURT BY YOUR PARTNER OR EX-PARTNER?: NO

## 2025-06-01 SDOH — ECONOMIC STABILITY: FOOD INSECURITY: HOW HARD IS IT FOR YOU TO PAY FOR THE VERY BASICS LIKE FOOD, HOUSING, MEDICAL CARE, AND HEATING?: NOT VERY HARD

## 2025-06-01 SDOH — SOCIAL STABILITY: SOCIAL INSECURITY
WITHIN THE LAST YEAR, HAVE YOU BEEN RAPED OR FORCED TO HAVE ANY KIND OF SEXUAL ACTIVITY BY YOUR PARTNER OR EX-PARTNER?: NO

## 2025-06-01 SDOH — SOCIAL STABILITY: SOCIAL INSECURITY: HAVE YOU HAD ANY THOUGHTS OF HARMING ANYONE ELSE?: NO

## 2025-06-01 SDOH — SOCIAL STABILITY: SOCIAL INSECURITY: WITHIN THE LAST YEAR, HAVE YOU BEEN AFRAID OF YOUR PARTNER OR EX-PARTNER?: NO

## 2025-06-01 SDOH — ECONOMIC STABILITY: HOUSING INSECURITY: AT ANY TIME IN THE PAST 12 MONTHS, WERE YOU HOMELESS OR LIVING IN A SHELTER (INCLUDING NOW)?: NO

## 2025-06-01 SDOH — SOCIAL STABILITY: SOCIAL INSECURITY: DO YOU FEEL ANYONE HAS EXPLOITED OR TAKEN ADVANTAGE OF YOU FINANCIALLY OR OF YOUR PERSONAL PROPERTY?: NO

## 2025-06-01 SDOH — ECONOMIC STABILITY: HOUSING INSECURITY: IN THE LAST 12 MONTHS, WAS THERE A TIME WHEN YOU WERE NOT ABLE TO PAY THE MORTGAGE OR RENT ON TIME?: NO

## 2025-06-01 SDOH — ECONOMIC STABILITY: HOUSING INSECURITY: IN THE PAST 12 MONTHS, HOW MANY TIMES HAVE YOU MOVED WHERE YOU WERE LIVING?: 0

## 2025-06-01 SDOH — ECONOMIC STABILITY: FOOD INSECURITY: WITHIN THE PAST 12 MONTHS, THE FOOD YOU BOUGHT JUST DIDN'T LAST AND YOU DIDN'T HAVE MONEY TO GET MORE.: NEVER TRUE

## 2025-06-01 SDOH — SOCIAL STABILITY: SOCIAL INSECURITY: ARE YOU OR HAVE YOU BEEN THREATENED OR ABUSED PHYSICALLY, EMOTIONALLY, OR SEXUALLY BY ANYONE?: NO

## 2025-06-01 SDOH — SOCIAL STABILITY: SOCIAL INSECURITY: ABUSE: ADULT

## 2025-06-01 SDOH — SOCIAL STABILITY: SOCIAL INSECURITY: DO YOU FEEL UNSAFE GOING BACK TO THE PLACE WHERE YOU ARE LIVING?: NO

## 2025-06-01 SDOH — SOCIAL STABILITY: SOCIAL INSECURITY: WERE YOU ABLE TO COMPLETE ALL THE BEHAVIORAL HEALTH SCREENINGS?: YES

## 2025-06-01 SDOH — SOCIAL STABILITY: SOCIAL INSECURITY: ARE THERE ANY APPARENT SIGNS OF INJURIES/BEHAVIORS THAT COULD BE RELATED TO ABUSE/NEGLECT?: NO

## 2025-06-01 SDOH — SOCIAL STABILITY: SOCIAL INSECURITY: HAVE YOU HAD THOUGHTS OF HARMING ANYONE ELSE?: YES

## 2025-06-01 SDOH — ECONOMIC STABILITY: TRANSPORTATION INSECURITY: IN THE PAST 12 MONTHS, HAS LACK OF TRANSPORTATION KEPT YOU FROM MEDICAL APPOINTMENTS OR FROM GETTING MEDICATIONS?: NO

## 2025-06-01 SDOH — SOCIAL STABILITY: SOCIAL INSECURITY: HAS ANYONE EVER THREATENED TO HURT YOUR FAMILY OR YOUR PETS?: NO

## 2025-06-01 ASSESSMENT — LIFESTYLE VARIABLES
HOW MANY STANDARD DRINKS CONTAINING ALCOHOL DO YOU HAVE ON A TYPICAL DAY: 1 OR 2
EVER HAD A DRINK FIRST THING IN THE MORNING TO STEADY YOUR NERVES TO GET RID OF A HANGOVER: NO
HAVE PEOPLE ANNOYED YOU BY CRITICIZING YOUR DRINKING: NO
EVER FELT BAD OR GUILTY ABOUT YOUR DRINKING: NO
HOW OFTEN DO YOU HAVE 6 OR MORE DRINKS ON ONE OCCASION: NEVER
SKIP TO QUESTIONS 9-10: 1
HAVE YOU EVER FELT YOU SHOULD CUT DOWN ON YOUR DRINKING: NO
AUDIT-C TOTAL SCORE: 1
AUDIT-C TOTAL SCORE: 1
TOTAL SCORE: 0
HOW OFTEN DO YOU HAVE A DRINK CONTAINING ALCOHOL: MONTHLY OR LESS

## 2025-06-01 ASSESSMENT — COGNITIVE AND FUNCTIONAL STATUS - GENERAL
MOBILITY SCORE: 24
DAILY ACTIVITIY SCORE: 24
PATIENT BASELINE BEDBOUND: NO

## 2025-06-01 ASSESSMENT — ACTIVITIES OF DAILY LIVING (ADL)
HEARING - RIGHT EAR: FUNCTIONAL
DRESSING YOURSELF: INDEPENDENT
ADEQUATE_TO_COMPLETE_ADL: YES
GROOMING: INDEPENDENT
FEEDING YOURSELF: INDEPENDENT
LACK_OF_TRANSPORTATION: NO
TOILETING: INDEPENDENT
HEARING - LEFT EAR: FUNCTIONAL
LACK_OF_TRANSPORTATION: NO
PATIENT'S MEMORY ADEQUATE TO SAFELY COMPLETE DAILY ACTIVITIES?: YES
WALKS IN HOME: INDEPENDENT
BATHING: INDEPENDENT
JUDGMENT_ADEQUATE_SAFELY_COMPLETE_DAILY_ACTIVITIES: YES

## 2025-06-01 ASSESSMENT — PAIN SCALES - GENERAL
PAINLEVEL_OUTOF10: 0 - NO PAIN

## 2025-06-01 ASSESSMENT — PATIENT HEALTH QUESTIONNAIRE - PHQ9
1. LITTLE INTEREST OR PLEASURE IN DOING THINGS: NOT AT ALL
SUM OF ALL RESPONSES TO PHQ9 QUESTIONS 1 & 2: 0
2. FEELING DOWN, DEPRESSED OR HOPELESS: NOT AT ALL

## 2025-06-01 ASSESSMENT — COLUMBIA-SUICIDE SEVERITY RATING SCALE - C-SSRS
6. HAVE YOU EVER DONE ANYTHING, STARTED TO DO ANYTHING, OR PREPARED TO DO ANYTHING TO END YOUR LIFE?: NO
1. IN THE PAST MONTH, HAVE YOU WISHED YOU WERE DEAD OR WISHED YOU COULD GO TO SLEEP AND NOT WAKE UP?: NO
1. IN THE PAST MONTH, HAVE YOU WISHED YOU WERE DEAD OR WISHED YOU COULD GO TO SLEEP AND NOT WAKE UP?: NO
2. HAVE YOU ACTUALLY HAD ANY THOUGHTS OF KILLING YOURSELF?: NO
2. HAVE YOU ACTUALLY HAD ANY THOUGHTS OF KILLING YOURSELF?: NO
6. HAVE YOU EVER DONE ANYTHING, STARTED TO DO ANYTHING, OR PREPARED TO DO ANYTHING TO END YOUR LIFE?: NO

## 2025-06-01 ASSESSMENT — PAIN - FUNCTIONAL ASSESSMENT: PAIN_FUNCTIONAL_ASSESSMENT: 0-10

## 2025-06-01 NOTE — H&P
History Of Present Illness  Johnathan Akins is a 70 y.o. male with history of PAF on xarelto, s/p ablations, HTN, DMII not on insulin presented to the ED feeling dizzy light headed. He was was fine until today. No fevers or chills. No recent changes to his medications. When EMS arrived his Bps were in the 60s. Which was confirmed on arrival here. He was given fluids with improvement. Workup benign accept for LISA and mild elevation in potassium. He was subsequently admitted overnight observation.      Past Medical History  He has a past medical history of Afib (Multi), Cardiac arrhythmia, unspecified, Diabetes mellitus (Multi), Hypertension, Personal history of other diseases of the respiratory system (04/24/2019), Personal history of other specified conditions (02/08/2022), Personal history of other specified conditions (02/03/2021), Personal history of other specified conditions (02/02/2021), and Personal history of urinary (tract) infections (02/03/2021).    Surgical History  He has a past surgical history that includes Knee surgery (03/23/2015); Other surgical history (03/23/2015); and Cystoscopy (03/23/2015).     Social History  He reports that he quit smoking about 30 years ago. His smoking use included cigarettes. He started smoking about 55 years ago. He has a 25 pack-year smoking history. He has been exposed to tobacco smoke. He has never used smokeless tobacco. He reports current alcohol use. He reports that he does not use drugs.    Family History  Family History[1]     10 point review of systems negative except per HPI      Last Recorded Vitals  /71   Pulse 60   Temp 36.3 °C (97.3 °F)   Resp 19   Wt 118 kg (260 lb)   SpO2 96%     Physical Exam   Gen: NAD  HEENT: EOM, MMM  CV: RRR, no murmurs rubs or gallops  Resp: coarse rhonchi b/l   Abdomen: soft, NT,+BS  LE: No edema        Relevant Results  Labs Reviewed   CBC WITH AUTO DIFFERENTIAL - Abnormal       Result Value    WBC 5.6      nRBC  0.0      RBC 4.15 (*)     Hemoglobin 11.8 (*)     Hematocrit 36.2 (*)     MCV 87      MCH 28.4      MCHC 32.6      RDW 15.6 (*)     Platelets 163      Neutrophils % 72.7      Immature Granulocytes %, Automated 0.5      Lymphocytes % 18.7      Monocytes % 7.4      Eosinophils % 0.5      Basophils % 0.2      Neutrophils Absolute 4.03      Immature Granulocytes Absolute, Automated 0.03      Lymphocytes Absolute 1.04      Monocytes Absolute 0.41      Eosinophils Absolute 0.03      Basophils Absolute 0.01     COMPREHENSIVE METABOLIC PANEL - Abnormal    Glucose 166 (*)     Sodium 133 (*)     Potassium 5.5 (*)     Chloride 104      Bicarbonate 22      Anion Gap 13      Urea Nitrogen 40 (*)     Creatinine 2.47 (*)     eGFR 27 (*)     Calcium 8.6      Albumin 3.8      Alkaline Phosphatase 77      Total Protein 6.9      AST 19      Bilirubin, Total 0.4      ALT 24     B-TYPE NATRIURETIC PEPTIDE - Normal    BNP 25      Narrative:        <100 pg/mL - Heart failure unlikely  100-299 pg/mL - Intermediate probability of acute heart                  failure exacerbation. Correlate with clinical                  context and patient history.    >=300 pg/mL - Heart Failure likely. Correlate with clinical                  context and patient history.    BNP testing is performed using different testing methodology at [facility] than at other [facility]. Direct result comparisons should only be made within the same method.      SERIAL TROPONIN-INITIAL - Normal    Troponin I, High Sensitivity 4      Narrative:     Less than 99th percentile of normal range cutoff-  Female and children under 18 years old <14 ng/L; Male <21 ng/L: Negative  Repeat testing should be performed if clinically indicated.     Female and children under 18 years old 14-50 ng/L; Male 21-50 ng/L:  Consistent with possible cardiac damage and possible increased clinical   risk. Serial measurements may help to assess extent of myocardial damage.      >50 ng/L: Consistent with cardiac damage, increased clinical risk and  myocardial infarction. Serial measurements may help assess extent of   myocardial damage.      NOTE: Children less than 1 year old may have higher baseline troponin   levels and results should be interpreted in conjunction with the overall   clinical context.     NOTE: Troponin I testing is performed using a different   testing methodology at [facility] than at [facility]. Direct result comparisons should only   be made within the same method.   SARS-COV-2 AND INFLUENZA A/B PCR - Normal    Flu A Result Not Detected      Flu B Result Not Detected      Coronavirus 2019, PCR Not Detected      Narrative:     This assay is an FDA-cleared, in vitro diagnostic nucleic acid amplification test for the qualitative detection and differentiation of SARS CoV-2/ Influenza A/B from nasopharyngeal specimens collected from individuals with signs and symptoms of respiratory tract infections, and has been validated for use at [facility]. Negative results do not preclude COVID-19/ Influenza A/B infections and should not be used as the sole basis for diagnosis, treatment, or other management decisions. Testing for SARS CoV-2 is recommended only for patients who meet current clinical and/or epidemiological criteria defined by federal, state, or local public health directives.   MAGNESIUM - Normal    Magnesium 1.64     POCT GLUCOSE - Normal    POCT Glucose 78     TROPONIN SERIES- (INITIAL, 1 HR)    Narrative:     The following orders were created for panel order Troponin I Series, High Sensitivity (0, 1 HR).  Procedure                               Abnormality         Status                     ---------                               -----------         ------                     Troponin I, High Sensiti...[[id number]]  Normal              Final result               Troponin, High Sensitivi...[[id number]]                       In process                   Please view results for these tests on the individual orders.   SERIAL TROPONIN, 1 HOUR   POTASSIUM   URINALYSIS WITH REFLEX CULTURE AND MICROSCOPIC    Narrative:     The following orders were created for panel order Urinalysis with Reflex Culture and Microscopic.  Procedure                               Abnormality         Status                     ---------                               -----------         ------                     Urinalysis with Reflex C...[[id number]]                                                 Extra Urine Gray Tube[[id number]]                                                         Please view results for these tests on the individual orders.   URINALYSIS WITH REFLEX CULTURE AND MICROSCOPIC   EXTRA URINE GRAY TUBE   POCT GLUCOSE METER     XR chest 1 view   Final Result   No active disease in the chest identified.        MACRO:   None        Signed by: Juaquin Ridley 6/1/2025 1:47 PM   Dictation workstation:   [id number]      CT head wo IV contrast   Final Result   No evidence of acute cortical infarct or intracranial hemorrhage.        MACRO:   None             Signed by: Michel Espinosa 6/1/2025 1:31 PM   Dictation workstation:   [id number]        Assessment/Plan  70 year old male with history of PAF, HTN, DMII, admitted with presyncope with LISA with hyperkalemia    -IV fluids 2L given in the ED, will continue oral hydration  -check UA for protein, renal U/S  -bladder scan prn   -hold aldactone and ace  -check orthostatics daily     Hyperkalemia: cocktail given in ED, monitor    PAF: continue home xarelto and amiodarone    DMII: on glyburide and metformin, will continue SSI here    DVT ppx: on xarelto        Ector Barnett MD         [1]   Family History  Problem Relation Name Age of Onset    Prostate cancer Father      Lung cancer Brother      Esophageal cancer Brother

## 2025-06-01 NOTE — ED PROVIDER NOTES
HPI   Chief Complaint   Patient presents with    Weakness, Gen     General weakness and felt dizzy.        Patient is a 70-year-old male with history of diabetes, hypertension, A-fib on Xarelto, 2 ablations most recently 10 years ago, cardiac stent who presents for lightheadedness and hypotension.  Patient was at home earlier this morning when he began feeling lightheaded.  States it was worse when he would stand up, better when he was sit down.  States he does have history of similar events, usually happens a few times a year and then resolves.  Denies chest pain, shortness of breath, fevers, chills, cough, runny nose, sore throat, nausea vomit, diarrhea, abdominal pain.  No changes to his vision or hearing, no LOC, weakness in his arms or legs.  Denies history of MI, CVA, DVT or PE.  Patient measured his blood pressure at home and he had a systolic in the 60s.  EMS registered a systolic of 67, heart was in the 50s.  Patient was given 200 cc of fluid and heart rate improved to 128/50.  States his symptoms improved after that.  Patient is a former smoker, no alcohol or drugs. No heart failure. States he does not drink very much water.              Patient History   Medical History[1]  Surgical History[2]  Family History[3]  Social History[4]    Physical Exam   ED Triage Vitals   Temp Pulse Resp BP   -- -- -- --      SpO2 Temp src Heart Rate Source Patient Position   -- -- -- --      BP Location FiO2 (%)     -- --       Physical Exam  Constitutional:       General: He is not in acute distress.  HENT:      Head: Normocephalic.      Mouth/Throat:      Mouth: Mucous membranes are dry.   Eyes:      Extraocular Movements: Extraocular movements intact.      Conjunctiva/sclera: Conjunctivae normal.      Pupils: Pupils are equal, round, and reactive to light.   Cardiovascular:      Rate and Rhythm: Normal rate and regular rhythm.      Pulses: Normal pulses.      Heart sounds: Normal heart sounds.   Pulmonary:      Effort:  Pulmonary effort is normal.      Breath sounds: Normal breath sounds.   Abdominal:      General: There is no distension.      Palpations: Abdomen is soft. There is no mass.      Tenderness: There is no abdominal tenderness. There is no guarding.   Musculoskeletal:         General: No deformity.      Cervical back: Normal range of motion and neck supple.      Right lower leg: Edema (trace) present.      Left lower leg: Edema (trace) present.   Skin:     General: Skin is warm and dry.      Findings: No lesion or rash.   Neurological:      General: No focal deficit present.      Mental Status: He is alert and oriented to person, place, and time. Mental status is at baseline.      Cranial Nerves: No cranial nerve deficit.      Sensory: No sensory deficit.      Motor: No weakness.      Comments: NIH 0, Van negative, no nystagmus, test of skew negative, ambulates without issue.   Psychiatric:         Mood and Affect: Mood normal.           ED Course & MDM   Diagnoses as of 06/01/25 1415   LISA (acute kidney injury)   Hyperkalemia                 No data recorded                                 Medical Decision Making  EKG on my interpretation: Rate 50, rhythm irregular, axis normal, NC unavailable, QRS 90, QTc 388, T waves: Unremarkable, ST segments: No elevations or depressions, interpretation: A-fib with slow ventricular response, similar to EKG from August 17, 2024,  No STEMI    EKG on my interpretation: Rate 62, rhythm regular, axis normal, NC unavailable, QRS 86, QTc 418, T waves: Unremarkable, ST segments: Elevations or depressions, interpretation: Atrial flutter, no STEMI    Patient is a 70-year-old male with above-stated past medical history who presents for lightheadedness and hypotension.  Patient's EKG is nonischemic, troponins are negative, repeat is pending.  Magnesium is 1.64, this was repleted in the emergency department.  Patient's CBC shows no leukocytosis, he is afebrile and clinically well-appearing, low  suspicion for sepsis.  Anemia is improving versus his previous value.  Chest x-ray my review did not show signs of pneumonia pneumothorax, this is confirmed by radiology.  CT head was negative for acute findings and patient is neurologically intact with no focal deficits, low suspicion for CVA.  Patient CMP shows an LISA, hyperkalemia.  Patient was given hyperkalemia cocktail.  On further discussion with the patient, he states he has not been urinating as much lately.  Urinalysis is pending as well as a bladder scan.  I have low suspicion for pulmonary embolism, dissection, Boerhaave's, cardiac tamponade.  I believe the patient would benefit from admission given his lightheadedness, anticoagulation use.  I believe his symptoms are most likely due to dehydration resulting in LISA and hypotension.  Patient was amenable to the plan.  Discussed this case with the medicine service who accepted the patient for admission.    Disclaimer: This note was dictated using speech recognition software. Minor errors in transcription may be present. Please call if questions.     Theo Jordan MD  [facility] Emergency Medicine  Contact on [employer]        Problems Addressed:  LISA (acute kidney injury): acute illness or injury  Hyperkalemia: acute illness or injury        Procedure  Critical Care    Performed by: Theo Jordan MD  Authorized by: Theo Jordan MD    Critical care provider statement:     Critical care time (minutes):  34    Critical care time was exclusive of:  Separately billable procedures and treating other patients    Critical care was necessary to treat or prevent imminent or life-threatening deterioration of the following conditions:  Renal failure (Hyperkalemia)    Critical care was time spent personally by me on the following activities:  Development of treatment plan with patient or surrogate, evaluation of patient's response to treatment, examination of patient, obtaining history from patient or surrogate,  ordering and performing treatments and interventions, ordering and review of laboratory studies, ordering and review of radiographic studies, pulse oximetry, re-evaluation of patient's condition and review of old charts    Care discussed with: admitting provider           Theo Jordan MD  [phone]         [1]   Past Medical History:  Diagnosis Date    Afib (Multi)     Cardiac arrhythmia, unspecified     Irregular heartbeat    Diabetes mellitus (Multi)     Hypertension     Personal history of other diseases of the respiratory system 2019    History of acute bronchitis    Personal history of other specified conditions 2022    History of gross hematuria    Personal history of other specified conditions 2021    History of urinary retention    Personal history of other specified conditions 2021    History of urinary urgency    Personal history of urinary (tract) infections 2021    History of urinary tract infection   [2]   Past Surgical History:  Procedure Laterality Date    CYSTOSCOPY  2015    Diagnostic Cystoscopy    KNEE SURGERY  2015    Knee Surgery    OTHER SURGICAL HISTORY  2015    Complex Bladder Flow Rate Studies   [3]   Family History  Problem Relation Name Age of Onset    Prostate cancer Father      Lung cancer Brother      Esophageal cancer Brother     [4]   Social History  Tobacco Use    Smoking status: Former     Current packs/day: 0.00     Average packs/day: 1 pack/day for 25.0 years (25.0 ttl pk-yrs)     Types: Cigarettes     Start date:      Quit date:      Years since quittin.4     Passive exposure: Past    Smokeless tobacco: Never   Substance Use Topics    Alcohol use: Yes    Drug use: Never        Theo Jordan MD  [phone]

## 2025-06-01 NOTE — CARE PLAN
The patient's goals for the shift include      The clinical goals for the shift include HDS, safety and comfort throughout shift    Problem: Pain - Adult  Goal: Verbalizes/displays adequate comfort level or baseline comfort level  Outcome: Progressing

## 2025-06-02 VITALS
BODY MASS INDEX: 40.43 KG/M2 | HEART RATE: 142 BPM | DIASTOLIC BLOOD PRESSURE: 63 MMHG | OXYGEN SATURATION: 96 % | RESPIRATION RATE: 24 BRPM | TEMPERATURE: 99.7 F | HEIGHT: 68 IN | WEIGHT: 266.76 LBS | SYSTOLIC BLOOD PRESSURE: 134 MMHG

## 2025-06-02 LAB
ANION GAP SERPL CALC-SCNC: 12 MMOL/L (ref 10–20)
ATRIAL RATE: 248 BPM
BUN SERPL-MCNC: 24 MG/DL (ref 6–23)
CALCIUM SERPL-MCNC: 9.2 MG/DL (ref 8.6–10.3)
CHLORIDE SERPL-SCNC: 104 MMOL/L (ref 98–107)
CO2 SERPL-SCNC: 23 MMOL/L (ref 21–32)
CREAT SERPL-MCNC: 1.4 MG/DL (ref 0.5–1.3)
EGFRCR SERPLBLD CKD-EPI 2021: 53 ML/MIN/1.73M*2
ERYTHROCYTE [DISTWIDTH] IN BLOOD BY AUTOMATED COUNT: 15.7 % (ref 11.5–14.5)
GLUCOSE BLD MANUAL STRIP-MCNC: 113 MG/DL (ref 74–99)
GLUCOSE BLD MANUAL STRIP-MCNC: 131 MG/DL (ref 74–99)
GLUCOSE SERPL-MCNC: 109 MG/DL (ref 74–99)
HCT VFR BLD AUTO: 37.1 % (ref 41–52)
HGB BLD-MCNC: 12.2 G/DL (ref 13.5–17.5)
HOLD SPECIMEN: NORMAL
MAGNESIUM SERPL-MCNC: 1.62 MG/DL (ref 1.6–2.4)
MCH RBC QN AUTO: 28.4 PG (ref 26–34)
MCHC RBC AUTO-ENTMCNC: 32.9 G/DL (ref 32–36)
MCV RBC AUTO: 86 FL (ref 80–100)
NRBC BLD-RTO: 0 /100 WBCS (ref 0–0)
P AXIS: 81 DEGREES
P OFFSET: 85 MS
P ONSET: 59 MS
PLATELET # BLD AUTO: 176 X10*3/UL (ref 150–450)
POTASSIUM SERPL-SCNC: 5 MMOL/L (ref 3.5–5.3)
Q ONSET: 213 MS
QRS COUNT: 10 BEATS
QRS DURATION: 86 MS
QT INTERVAL: 412 MS
QTC CALCULATION(BAZETT): 418 MS
QTC FREDERICIA: 416 MS
R AXIS: -29 DEGREES
RBC # BLD AUTO: 4.3 X10*6/UL (ref 4.5–5.9)
SODIUM SERPL-SCNC: 134 MMOL/L (ref 136–145)
T AXIS: -45 DEGREES
T OFFSET: 419 MS
VENTRICULAR RATE: 62 BPM
WBC # BLD AUTO: 5.5 X10*3/UL (ref 4.4–11.3)

## 2025-06-02 PROCEDURE — 36415 COLL VENOUS BLD VENIPUNCTURE: CPT | Performed by: HOSPITALIST

## 2025-06-02 PROCEDURE — 80048 BASIC METABOLIC PNL TOTAL CA: CPT | Performed by: HOSPITALIST

## 2025-06-02 PROCEDURE — 82947 ASSAY GLUCOSE BLOOD QUANT: CPT | Mod: 59

## 2025-06-02 PROCEDURE — 2500000001 HC RX 250 WO HCPCS SELF ADMINISTERED DRUGS (ALT 637 FOR MEDICARE OP): Performed by: STUDENT IN AN ORGANIZED HEALTH CARE EDUCATION/TRAINING PROGRAM

## 2025-06-02 PROCEDURE — G0378 HOSPITAL OBSERVATION PER HR: HCPCS

## 2025-06-02 PROCEDURE — 83735 ASSAY OF MAGNESIUM: CPT | Performed by: HOSPITALIST

## 2025-06-02 PROCEDURE — 2500000002 HC RX 250 W HCPCS SELF ADMINISTERED DRUGS (ALT 637 FOR MEDICARE OP, ALT 636 FOR OP/ED)

## 2025-06-02 PROCEDURE — 2500000002 HC RX 250 W HCPCS SELF ADMINISTERED DRUGS (ALT 637 FOR MEDICARE OP, ALT 636 FOR OP/ED): Performed by: STUDENT IN AN ORGANIZED HEALTH CARE EDUCATION/TRAINING PROGRAM

## 2025-06-02 PROCEDURE — 99239 HOSP IP/OBS DSCHRG MGMT >30: CPT

## 2025-06-02 PROCEDURE — 96375 TX/PRO/DX INJ NEW DRUG ADDON: CPT

## 2025-06-02 PROCEDURE — 2500000004 HC RX 250 GENERAL PHARMACY W/ HCPCS (ALT 636 FOR OP/ED): Performed by: STUDENT IN AN ORGANIZED HEALTH CARE EDUCATION/TRAINING PROGRAM

## 2025-06-02 PROCEDURE — 2500000001 HC RX 250 WO HCPCS SELF ADMINISTERED DRUGS (ALT 637 FOR MEDICARE OP)

## 2025-06-02 PROCEDURE — 85027 COMPLETE CBC AUTOMATED: CPT | Performed by: HOSPITALIST

## 2025-06-02 RX ORDER — TAMSULOSIN HYDROCHLORIDE 0.4 MG/1
0.4 CAPSULE ORAL DAILY
Status: DISCONTINUED | OUTPATIENT
Start: 2025-06-02 | End: 2025-06-02 | Stop reason: HOSPADM

## 2025-06-02 RX ORDER — SIMVASTATIN 20 MG/1
20 TABLET, FILM COATED ORAL DAILY
Status: DISCONTINUED | OUTPATIENT
Start: 2025-06-02 | End: 2025-06-02 | Stop reason: HOSPADM

## 2025-06-02 RX ORDER — DILTIAZEM HYDROCHLORIDE 300 MG/1
300 CAPSULE, COATED, EXTENDED RELEASE ORAL DAILY
Status: DISCONTINUED | OUTPATIENT
Start: 2025-06-02 | End: 2025-06-02 | Stop reason: HOSPADM

## 2025-06-02 RX ORDER — OXYBUTYNIN CHLORIDE 5 MG/1
5 TABLET, EXTENDED RELEASE ORAL NIGHTLY
Qty: 30 TABLET | Refills: 0 | Status: SHIPPED | OUTPATIENT
Start: 2025-06-02 | End: 2025-07-02

## 2025-06-02 RX ORDER — METOPROLOL TARTRATE 1 MG/ML
5 INJECTION, SOLUTION INTRAVENOUS EVERY 5 MIN PRN
Status: DISCONTINUED | OUTPATIENT
Start: 2025-06-02 | End: 2025-06-02 | Stop reason: HOSPADM

## 2025-06-02 RX ORDER — METOPROLOL TARTRATE 50 MG/1
50 TABLET ORAL 2 TIMES DAILY
Status: DISCONTINUED | OUTPATIENT
Start: 2025-06-02 | End: 2025-06-02 | Stop reason: HOSPADM

## 2025-06-02 RX ORDER — SPIRONOLACTONE 25 MG/1
25 TABLET ORAL DAILY
Status: DISCONTINUED | OUTPATIENT
Start: 2025-06-02 | End: 2025-06-02 | Stop reason: HOSPADM

## 2025-06-02 RX ORDER — AMIODARONE HYDROCHLORIDE 200 MG/1
200 TABLET ORAL DAILY
Status: DISCONTINUED | OUTPATIENT
Start: 2025-06-02 | End: 2025-06-02 | Stop reason: HOSPADM

## 2025-06-02 RX ORDER — TERAZOSIN 1 MG/1
2 CAPSULE ORAL NIGHTLY
Status: DISCONTINUED | OUTPATIENT
Start: 2025-06-02 | End: 2025-06-02 | Stop reason: HOSPADM

## 2025-06-02 RX ORDER — PANTOPRAZOLE SODIUM 40 MG/1
40 TABLET, DELAYED RELEASE ORAL
Status: DISCONTINUED | OUTPATIENT
Start: 2025-06-03 | End: 2025-06-02 | Stop reason: HOSPADM

## 2025-06-02 RX ORDER — OXYBUTYNIN CHLORIDE 5 MG/1
5 TABLET ORAL NIGHTLY
Status: DISCONTINUED | OUTPATIENT
Start: 2025-06-02 | End: 2025-06-02 | Stop reason: HOSPADM

## 2025-06-02 RX ORDER — LISINOPRIL 5 MG/1
5 TABLET ORAL DAILY
Status: DISCONTINUED | OUTPATIENT
Start: 2025-06-02 | End: 2025-06-02 | Stop reason: HOSPADM

## 2025-06-02 RX ADMIN — AMIODARONE HYDROCHLORIDE 200 MG: 200 TABLET ORAL at 08:13

## 2025-06-02 RX ADMIN — METOPROLOL TARTRATE 50 MG: 50 TABLET, FILM COATED ORAL at 08:13

## 2025-06-02 RX ADMIN — SIMVASTATIN 20 MG: 20 TABLET, FILM COATED ORAL at 10:14

## 2025-06-02 RX ADMIN — LISINOPRIL 5 MG: 5 TABLET ORAL at 10:14

## 2025-06-02 RX ADMIN — DILTIAZEM HYDROCHLORIDE 300 MG: 300 CAPSULE, COATED, EXTENDED RELEASE ORAL at 08:13

## 2025-06-02 RX ADMIN — TAMSULOSIN HYDROCHLORIDE 0.4 MG: 0.4 CAPSULE ORAL at 10:14

## 2025-06-02 RX ADMIN — SPIRONOLACTONE 25 MG: 25 TABLET ORAL at 10:14

## 2025-06-02 RX ADMIN — METOPROLOL TARTRATE 5 MG: 5 INJECTION INTRAVENOUS at 07:56

## 2025-06-02 ASSESSMENT — COGNITIVE AND FUNCTIONAL STATUS - GENERAL
MOBILITY SCORE: 24
DAILY ACTIVITIY SCORE: 24

## 2025-06-02 ASSESSMENT — PAIN SCALES - GENERAL: PAINLEVEL_OUTOF10: 0 - NO PAIN

## 2025-06-02 NOTE — PROGRESS NOTES
06/02/25 1127   Discharge Planning   Living Arrangements Spouse/significant other   Support Systems Spouse/significant other   Assistance Needed independent, no assist device   Type of Residence Private residence   Number of Stairs to Enter Residence 3   Number of Stairs Within Residence 13  (basement)   Do you have animals or pets at home? Yes   Type of Animals or Pets 2 dogs   Who is requesting discharge planning? Provider   Home or Post Acute Services None   Expected Discharge Disposition Home   Does the patient need discharge transport arranged? No   Stroke Family Assessment   Stroke Family Assessment Needed No   Intensity of Service   Intensity of Service 0-30 min     Pt admitted with acute kidney injury, hypotension. Independent PTA, lives with spouse in 1 story home. Still drives. Does not use an assist device. Plan to dc home no needs.

## 2025-06-02 NOTE — DISCHARGE INSTRUCTIONS
Thank you for choosing [facility]. It has been a pleasure taking part in your medical care. Please follow up with your primary care provider as instructed. If your symptoms should persist or worsen, please contact your primary care physician, or in the case of an emergency proceed to the nearest Emergency Room for further care. If you have any questions about the care you received, please call [facility] at [phone]. Thank you again! MARGIE Cardenas

## 2025-06-02 NOTE — DISCHARGE SUMMARY
Discharge Diagnosis  LISA (acute kidney injury)    Issues Requiring Follow-Up  None    Discharge Meds     Your medication list        ASK your doctor about these medications        Instructions Last Dose Given Next Dose Due   amiodarone 200 mg tablet  Commonly known as: Pacerone      TAKE 1 TABLET(200 MG) BY MOUTH DAILY. DO NOT FILL BEFORE AUGUST 12, 2024       cyanocobalamin (vitamin B-12) 1,000 mcg tablet, sublingual           cyclobenzaprine 5 mg tablet  Commonly known as: Flexeril      Take 1 tab bid in daytime, then 2 at hs if needed       dilTIAZem  mg 24 hr capsule  Commonly known as: Tiazac           glipiZIDE XL 2.5 mg 24 hr tablet  Commonly known as: Glucotrol XL      TAKE 1 TABLET BY MOUTH 3 TIMES  DAILY BEFORE EACH MEAL AND 1  TABLET BY MOUTH AT BEDTIME       lisinopril 5 mg tablet      TAKE 1 TABLET BY MOUTH ONCE  DAILY       metFORMIN 1,000 mg tablet  Commonly known as: Glucophage      TAKE 1 TABLET BY MOUTH TWICE  DAILY       metoprolol tartrate 50 mg tablet  Commonly known as: Lopressor      TAKE 1 TABLET BY MOUTH TWICE  DAILY       omeprazole 40 mg DR capsule  Commonly known as: PriLOSEC      Take 1 capsule (40 mg) by mouth once daily in the morning. Take before meals.       oxyBUTYnin XL 10 mg 24 hr tablet  Commonly known as: Ditropan-XL      Take 1 tablet (10 mg) by mouth once daily at bedtime. Do not crush, chew, or split.       rivaroxaban 20 mg tablet  Commonly known as: Xarelto           simvastatin 20 mg tablet  Commonly known as: Zocor      TAKE 1 TABLET BY MOUTH DAILY       spironolactone 25 mg tablet  Commonly known as: Aldactone      TAKE 1 TABLET BY MOUTH DAILY       tamsulosin 0.4 mg 24 hr capsule  Commonly known as: Flomax      Take 1 capsule (0.4 mg) by mouth once daily.       terazosin 2 mg capsule  Commonly known as: Hytrin      TAKE 1 CAPSULE BY MOUTH ONCE  DAILY AT BEDTIME       trospium 20 mg tablet  Commonly known as: Sanctura                    Test Results Pending At  Discharge  Pending Labs       No current pending labs.            Last Vitals  Vitals:    06/02/25 0813 06/02/25 0815 06/02/25 0818 06/02/25 0823   BP:  119/72 111/71 134/63   BP Location:  Right arm     Patient Position:  Lying Sitting Standing   Pulse: (!) 135 (!) 134 (!) 128 (!) 142   Resp:  24 24    Temp:  37.6 °C (99.7 °F)     TempSrc:  Temporal     SpO2:  91% 94% 96%   Weight:       Height:           Hospital Course   Johnathan Akins is a 70 y.o. male with history of PAF on xarelto, s/p ablations, HTN, DMII not on insulin presented to the ED feeling dizzy light headed. He was was fine until today. No fevers or chills. No recent changes to his medications. When EMS arrived his Bps were in the 60s. Which was confirmed on arrival here. He was given fluids with improvement. Workup benign accept for LISA and mild elevation in potassium. He was subsequently admitted overnight observation.  Patient was given IV fluids showed improvement.  UA and renal ultrasound negative.  Orthostatics were negative.  Home meds were resumed.  Did discharge patient hemodynamically stable    Pertinent Physical Exam At Time of Discharge  Physical Exam  Constitutional:       Appearance: Normal appearance.   HENT:      Head: Normocephalic.      Mouth/Throat:      Mouth: Mucous membranes are moist.   Eyes:      Pupils: Pupils are equal, round, and reactive to light.   Cardiovascular:      Rate and Rhythm: Normal rate and regular rhythm.      Heart sounds: Normal heart sounds, S1 normal and S2 normal.   Pulmonary:      Effort: Pulmonary effort is normal.      Breath sounds: Normal breath sounds.   Abdominal:      General: Bowel sounds are normal.      Palpations: Abdomen is soft.   Musculoskeletal:         General: Normal range of motion.      Cervical back: Neck supple.   Skin:     General: Skin is warm.   Neurological:      Mental Status: He is alert and oriented to person, place, and time.   Psychiatric:         Mood and Affect: Mood  normal.         Behavior: Behavior normal.         Outpatient Follow-Up  Future Appointments   Date Time Provider Department Center   2/24/2026  1:00 PM Leo Patten MD [id number] [place]         Theresa Choi, APRN-CNP

## 2025-06-02 NOTE — CARE PLAN
The patient's goals for the shift include      The clinical goals for the shift include Patient will maintain heart rate <110

## 2025-06-03 ENCOUNTER — PATIENT OUTREACH (OUTPATIENT)
Dept: PRIMARY CARE | Facility: CLINIC | Age: 70
End: 2025-06-03
Payer: MEDICARE

## 2025-06-03 NOTE — PROGRESS NOTES
Discharge Facility: [facility]  Discharge Diagnosis: LISA (acute kidney injury)   Admission Date: 6/1/25  Discharge Date: 6/2/25    PCP Appointment Date: 6/13/25.  Specialist Appointment Date: Unknown  Hospital Encounter and Summary Linked: Yes    Discharge Summary by Theresa Choi APRN-CNP (06/02/2025 11:20)   See discharge assessment below for further details    Wrap Up  Wrap Up Additional Comments: This CM spoke with pts spouse via phone. Spouse reports pt doing well at home since discharge. New meds reviewed. Spouse reports they have picked up all new meds and have no questions at this time. Spouse aware of my availability for non-emergent concerns. Contact info provided. (6/3/2025  9:07 AM)    Engagement  Call Start Time: 0904 (spoke to patients wife) (6/3/2025  9:07 AM)    Medications  Medications reviewed with patient/caregiver?: Not applicable (6/3/2025  9:07 AM)  Does the patient have all medications ordered at discharge?: Not applicable (6/3/2025  9:07 AM)  Care Management Interventions: No intervention needed (6/3/2025  9:07 AM)  Prescription Comments: n/a (6/3/2025  9:07 AM)  Is the patient taking all medications as directed (includes completed medication regime)?: Not applicable (6/3/2025  9:07 AM)  Medication Comments: n/a (6/3/2025  9:07 AM)    Appointments  Does the patient have a primary care provider?: Yes (patient will call and schedule appt.) (6/3/2025  9:07 AM)  Care Management Interventions: Educated patient on importance of making appointment (6/3/2025  9:07 AM)  Has the patient kept scheduled appointments due by today?: Yes (6/3/2025  9:07 AM)  Care Management Interventions: Educated on importance of keeping appointment (6/3/2025  9:07 AM)    Self Management  What is the home health agency?: n/a (6/3/2025  9:07 AM)  Has home health visited the patient within 72 hours of discharge?: Not applicable (6/3/2025  9:07 AM)    Patient Teaching  Does the patient have access to their  discharge instructions?: Yes (6/3/2025  9:07 AM)  Care Management Interventions: Reviewed instructions with patient (6/3/2025  9:07 AM)  What is the patient's perception of their health status since discharge?: Improving (6/3/2025  9:07 AM)  Is the patient/caregiver able to teach back the hierarchy of who to call/visit for symptoms/problems? PCP, Specialist, Home Health nurse, Urgent Care, ED, 911: Yes (6/3/2025  9:07 AM)      Danish Elizondo LPN

## 2025-06-10 DIAGNOSIS — N18.30 CONTROLLED TYPE 2 DIABETES MELLITUS WITH STAGE 3 CHRONIC KIDNEY DISEASE, WITHOUT LONG-TERM CURRENT USE OF INSULIN (MULTI): Primary | ICD-10-CM

## 2025-06-10 DIAGNOSIS — E11.22 CONTROLLED TYPE 2 DIABETES MELLITUS WITH STAGE 3 CHRONIC KIDNEY DISEASE, WITHOUT LONG-TERM CURRENT USE OF INSULIN (MULTI): Primary | ICD-10-CM

## 2025-06-10 NOTE — PROGRESS NOTES
I reviewed the progress note and agree with the resident’s findings and plans as written. Case discussed with resident.    Jacquie Domingo, PharmD

## 2025-06-13 ENCOUNTER — APPOINTMENT (OUTPATIENT)
Dept: PRIMARY CARE | Facility: CLINIC | Age: 70
End: 2025-06-13
Payer: MEDICARE

## 2025-06-13 DIAGNOSIS — E78.00 HYPERCHOLESTEROLEMIA: ICD-10-CM

## 2025-06-13 DIAGNOSIS — N18.30 CONTROLLED TYPE 2 DIABETES MELLITUS WITH STAGE 3 CHRONIC KIDNEY DISEASE, WITHOUT LONG-TERM CURRENT USE OF INSULIN (MULTI): Primary | ICD-10-CM

## 2025-06-13 DIAGNOSIS — E11.22 CONTROLLED TYPE 2 DIABETES MELLITUS WITH STAGE 3 CHRONIC KIDNEY DISEASE, WITHOUT LONG-TERM CURRENT USE OF INSULIN (MULTI): Primary | ICD-10-CM

## 2025-06-13 DIAGNOSIS — E11.9 TYPE 2 DIABETES MELLITUS WITHOUT COMPLICATION, WITHOUT LONG-TERM CURRENT USE OF INSULIN: ICD-10-CM

## 2025-06-13 DIAGNOSIS — N17.9 AKI (ACUTE KIDNEY INJURY): ICD-10-CM

## 2025-06-13 DIAGNOSIS — I42.8 CARDIOMYOPATHY, NONISCHEMIC (MULTI): ICD-10-CM

## 2025-06-13 DIAGNOSIS — I10 PRIMARY HYPERTENSION: ICD-10-CM

## 2025-06-13 PROCEDURE — 3044F HG A1C LEVEL LT 7.0%: CPT | Performed by: FAMILY MEDICINE

## 2025-06-13 PROCEDURE — 1159F MED LIST DOCD IN RCRD: CPT | Performed by: FAMILY MEDICINE

## 2025-06-13 PROCEDURE — 99495 TRANSJ CARE MGMT MOD F2F 14D: CPT | Performed by: FAMILY MEDICINE

## 2025-06-13 PROCEDURE — 1160F RVW MEDS BY RX/DR IN RCRD: CPT | Performed by: FAMILY MEDICINE

## 2025-06-13 PROCEDURE — 3074F SYST BP LT 130 MM HG: CPT | Performed by: FAMILY MEDICINE

## 2025-06-13 PROCEDURE — 3078F DIAST BP <80 MM HG: CPT | Performed by: FAMILY MEDICINE

## 2025-06-13 PROCEDURE — 3008F BODY MASS INDEX DOCD: CPT | Performed by: FAMILY MEDICINE

## 2025-06-13 PROCEDURE — 4010F ACE/ARB THERAPY RXD/TAKEN: CPT | Performed by: FAMILY MEDICINE

## 2025-06-13 RX ORDER — IBUPROFEN 200 MG
1 CAPSULE ORAL 2 TIMES DAILY
Qty: 100 STRIP | Refills: 3 | Status: SHIPPED | OUTPATIENT
Start: 2025-06-13

## 2025-06-13 RX ORDER — DEXTROSE 4 G
TABLET,CHEWABLE ORAL
Qty: 1 EACH | Refills: 0 | Status: SHIPPED | OUTPATIENT
Start: 2025-06-13

## 2025-06-13 ASSESSMENT — ENCOUNTER SYMPTOMS
DEPRESSION: 0
LOSS OF SENSATION IN FEET: 0
OCCASIONAL FEELINGS OF UNSTEADINESS: 0

## 2025-06-13 NOTE — PROGRESS NOTES
Subjective   Patient ID: Johnathan Akins is a 70 y.o. male who presents for Hospital Follow-up (Patient was seen at [facility] from 06/01/2025 to 06/02/2025 for LISA. ).  HPI  Pleasant 70-year-old gentleman was recently discharged from the hospital on June 2 after acute hypotension secondary to dehydration after adequate IV fluids and persistent atrial fibs flutter he was maintain and improve dramatically from 2.4 down to 1.4 to 1-day on his creatinine the patient is followed by cardiology and is on the 100 mg of Cardizem which was held for 1 day as well as metoprolol 50 mg twice a day has had no palpitations and his intake of fluid has improved he does take Aldactone in the afternoon as well as Xarelto for his chronic atrial fibrillation.  Also takes Zocor for dyslipidemia --amiodarone 200 mg daily  Thanks is glipizide metformin on discharge twice a day.  Medicine compliance dietary compliance and increasing liquids.  He has no chest pain short of breath palpitation orthopnea or peripheral edema since being home on 2 June  Above medicines labs x-rays reviewed gratefully CT of the head was negative as well as chest x-ray negative in the emergency room  Review of Systems   Constitutional:  Positive for fatigue. Negative for fever and unexpected weight change.   Respiratory:  Negative for cough and shortness of breath.    Cardiovascular:  Negative for chest pain, palpitations and leg swelling.   Gastrointestinal:  Negative for abdominal pain, blood in stool, nausea and vomiting.   Genitourinary:  Positive for frequency. Negative for dysuria, flank pain and hematuria.   Musculoskeletal:  Positive for arthralgias.   Skin:  Negative for rash.   Neurological:  Positive for weakness (Is from hypotension after IV fluids.) and light-headedness. Negative for tremors, seizures, numbness and headaches.       Objective   /58 (BP Location: Right arm, Patient Position: Sitting, BP Cuff Size: Large adult)    "Pulse 88   Temp 36.1 °C (97 °F) (Temporal)   Resp 16   Ht 1.727 m (5' 8\")   Wt 120 kg (265 lb)   SpO2 97%   BMI 40.29 kg/m²     B-type natriuretic peptide  Order: [id number]   Status: Final result    Test Result Rele[facility]: No [facility]    0 Result Notes         Component  Ref Range & Units 2 wk ago  (6/1/25) 9 mo ago  (9/5/24) 10 mo ago  (8/17/24) 10 mo ago  (8/8/24)   BNP  0 - 99 pg/mL 25 55 31 63   Resulting Agency          pecific Antigen  Order: [id number]   Status: Final result    Test Result Released: No [facility])    0 Result Notes      Component  Ref Range & Units 3 mo ago   PSA, TOTAL  < OR = 4.00 ng/mL 0.47   Comment: The total PSA value from this assay system is  stand      XR chest 1 view  Status: Final result     PACS Images     Show images for XR chest 1 view  Signed by    Signed Time Phone Pager   Juaquin Ridley MD 6/01/2025 13:47 [phone]     Exam Information    Status Exam Begun Exam Ended   Final 6/01/2025 13:26 6/01/2025 13:44     Study Result    Narrative & Impression   Interpreted By:  Juaquin Ridley,   STUDY:  XR CHEST 1 VIEW;  6/1/2025 1:44 pm      INDICATION:  Signs/Symptoms:Chest Pain.      COMPARISON:  08/17/2024      ACCESSION NUMBER(S):  [id number]      ORDERING CLINICIAN:  CRISTIN DUNCAN      FINDINGS:  The lungs appear clear. No apparent pleural effusion.  Cardiomediastinal silhouette unchanged. No congestive failure.      IMPRESSION:  No active disease in the chest identified.      MACRO:  None       CT head wo IV contrast  Status: Final result     PACS Images     Show images for CT head wo IV contrast  Signed by    Signed Time Phone Pager   Michel Espinosa MD 6/01/2025 13:31 [phone]     Exam Information    Status Exam Begun Exam Ended   Final 6/01/2025 13:16 6/01/2025 13:27     Study Result    Narrative & Impression   Interpreted By:  Michel Espinosa,   STUDY:  CT HEAD WO IV CONTRAST; 6/1/2025 1:27 pm    "   INDICATION:  Signs/Symptoms:light headed.      COMPARISON:  None.      ACCESSION NUMBER(S):  [id number]      ORDERING CLINICIAN:  CRISTIN DUNCAN      TECHNIQUE:  Contiguous axial CT images were obtained through the head at 2 mm  slice thickness without contrast administration.      FINDINGS:  INTRACRANIAL:  The ventricles, sulci and basal cisterns are within normal limits for  size and configuration. The grey-white differentiation is intact.  There is no mass effect or midline shift. There is no extraaxial  fluid collection. There is no intracranial hemorrhage.  The calvarium  is unremarkable.      EXTRACRANIAL:  Visualized paranasal sinuses and mastoids are clear.      IMPRESSION:  No evidence of acute cortical infarct or intracranial hemorrhage.      MACRO:  None            Contains abnormal data CBC  Order: [id number]   Status: Final result    Test Result Released: No (inaccessible in [facility])    0 Result Notes            Component  Ref Range & Units 2 wk ago  (6/2/25) 2 wk ago  (6/1/25) 10 mo ago  (8/17/24) 10 mo ago  (8/11/24) 10 mo ago  (8/10/24) 10 mo ago  (8/9/24) 10 mo ago  (8/8/24)   WBC  4.4 - 11.3 x10*3/uL 5.5 5.6 7.2 10.3 8.8 8.2 9.9   nRBC  0.0 - 0.0 /100 WBCs 0.0 0.0 0.0 0.0 0.0 0.0 0.0   RBC  4.50 - 5.90 x10*6/uL 4.30 Low  4.15 Low  3.79 Low  3.99 Low  3.83 Low  3.82 Low  3.68 Low    Hemoglobin  13.5 - 17.5 g/dL 12.2 Low  11.8 Low  10.3 Low  10.7 Low  10.2 Low  10.2 Low  10.0 Low    Hematocrit  41.0 - 52.0 % 37.1 Low  36.2 Low  32.1 Low  32.9 Low  32.5 Low  31.7 Low  30.7 Low    MCV  80 - 100 fL 86 87 85 83 85 83 83   MCH  26.0 - 34.0 pg 28.4 28.4 27.2 26.8 26.6 26.7 27.2   MCHC  32.0 - 36.0 g/dL 32.9 32.6 32.1 32.5 31.4 Low  32.2 32.6   RDW  11.5 - 14.5 % 15.7 High  15.6 High  16.3 High  15.7 High  15.9 High  16.0 High  15.8 High    Platelets  150 - 450 x10*3/uL 176 1             nt  Ref Range & Units 2 wk ago  (6/2/25) 2 wk ago  (6/1/25) 2 wk ago  (6/1/25) 9 mo ago  (9/5/24) 10 mo  ago  (8/17/24) 10 mo ago  (8/11/24) 10 mo ago  (8/10/24)   Glucose  74 - 99 mg/dL 109 High   166 High  111 High  139 High  139 High  133 High    Sodium  136 - 145 mmol/L 134 Low   133 Low  134 Low  133 Low  133 Low  130 Low    Potassium  3.5 - 5.3 mmol/L 5.0 5.5 High  5.5 High  5.1 4.7 4.6 4.4   Chloride  98 - 107 mmol/L 104  104 104 103 102 101   Bicarbonate  21 - 32 mmol/L 23  22 22 21 24 20 Low    Anion Gap  10 - 20 mmol/L 12  13 13 14 12 13   Urea Nitrogen  6 - 23 mg/dL 24 High   40 High  24 High  25 High  21 20   Creatinine  0.50 - 1.30 mg/dL 1.40 High   2.47 High  1.39 High  1.44 High  1.24 1.33 High    eGFR  >60 mL/min/1.73m*2 53 Low          ntains abnormal data Hemoglobin A1c  Order: [id number]   Status: Final result    Test Result Released: No (inaccessible in [facility])    0 Result Notes       1  Topic            Component  Ref Range & Units 2 wk ago 10 mo ago 1 yr ago 2 yr ago 3 yr ago 4 yr ago 5 yr ago   Hemoglobin A1C  See comment % 6.1 High  7.1 High  R 6.5 High  R 6.4 Abnormal  R, CM 6.2 Abnormal  R, CM 6.2 R, CM 5.9 R, CM   Estimated Average Glucose  Not Established mg/dL 128            retation Summary  Show Result ComparisonAtrial flutter with 4:1 AV conduction  Nonspecific T wave abnormality  Abnormal ECG  When compared with ECG of 01-JUN-2025 13:18, (unconfirmed)  Atrial flutter has replaced Atrial fibrillation  Nonspecific T wave abnormality now evident in Lateral leads  See ED provider note for full interpretation and clinical correlation  Confirmed by Jennifer Brothers ([id number]) on 6/14/2025 12:17:32 P  Physical Exam  Blood pressure repeated about 106 110/82 otherwise pulse irregular but stable at 88-84  General  obese individual in no acute distress  ENT adequate hydration the membranes number  Neck neck is supple no mass adenopathy bruits or rigidity  Cardiovascular controlled ventricular rate of irregular irregularity.  Chest actually diminished breath sounds at bases but otherwise no  wheezing rales or rhonchi  Abdominal no organomegaly mass rebound in mid upper abdomen no CVA tenderness  Peripheral vascular only trace of 4+ edema otherwise mildly reduced sensation to vibration but distal pulses are intact Homans' sign is negative  Mood mood is stable doubt anxiety depressive or cognitive issues  Neuro no new deficits of the upper and lower extremities cranial nerves are intact speech gait cognition is normal  Extensive review in regards to above x-rays lab discharge medicines with spouse and patient      Assessment/Plan   Problem List Items Addressed This Visit       Controlled type 2 diabetes mellitus with stage 3 chronic kidney disease (Multi) - Primary    Relevant Orders    Basic metabolic panel    Hypercholesterolemia    Hypertension    Type 2 diabetes mellitus without complication, without long-term current use of insulin    Relevant Medications    blood-glucose meter misc    blood sugar diagnostic (Blood Glucose Test)    Cardiomyopathy, nonischemic (Multi)    LSIA (acute kidney injury)   Reviewing slightly low blood pressure will continue increasing liquids.  Maintain low-salt low carbohydrate and low-fat diet maintain his diabetic medicines but with recent acute kidney injury I prefer to stop all metformin recheck in 3 weeks with previsit BMP  He can get off metformin because actually his A1c was less than 7.  He will continue follow-up with his cardiologist  I did review his above Xarelto as well as cardiac medicines antilipid therapy antilipid diet and diabetic medicines.  Since discharge follow-up with his EP physician at their direction as he is on amiodarone.  Above reviewed with spouse as well as patient in detail importance of good medicine compliance liquids reviewed frequent small meals and increase hydration  @discharge  The above diagnosis and treatment plan was discussed with the patient patient will continue appropriate diet and exercise as reviewed  Patient will recheck  earlier if any interval problems of significance or clinical worsening of the above problems.  Agrees above surveillance.  All question were addressed regarding above meds   If worsening chest pain or shortness of breath despite treatment with weakness, proceed to ER

## 2025-06-14 LAB
ATRIAL RATE: 248 BPM
ATRIAL RATE: 267 BPM
P AXIS: 81 DEGREES
P OFFSET: 85 MS
P ONSET: 59 MS
Q ONSET: 213 MS
Q ONSET: 213 MS
QRS COUNT: 10 BEATS
QRS COUNT: 9 BEATS
QRS DURATION: 86 MS
QRS DURATION: 90 MS
QT INTERVAL: 396 MS
QT INTERVAL: 412 MS
QTC CALCULATION(BAZETT): 388 MS
QTC CALCULATION(BAZETT): 418 MS
QTC FREDERICIA: 391 MS
QTC FREDERICIA: 416 MS
R AXIS: -29 DEGREES
R AXIS: -9 DEGREES
T AXIS: -11 DEGREES
T AXIS: -45 DEGREES
T OFFSET: 411 MS
T OFFSET: 419 MS
VENTRICULAR RATE: 58 BPM
VENTRICULAR RATE: 62 BPM

## 2025-06-17 VITALS
TEMPERATURE: 97 F | BODY MASS INDEX: 40.16 KG/M2 | RESPIRATION RATE: 16 BRPM | DIASTOLIC BLOOD PRESSURE: 58 MMHG | HEIGHT: 68 IN | SYSTOLIC BLOOD PRESSURE: 114 MMHG | WEIGHT: 265 LBS | HEART RATE: 88 BPM | OXYGEN SATURATION: 97 %

## 2025-06-17 DIAGNOSIS — N18.30 CONTROLLED TYPE 2 DIABETES MELLITUS WITH STAGE 3 CHRONIC KIDNEY DISEASE, WITHOUT LONG-TERM CURRENT USE OF INSULIN (MULTI): ICD-10-CM

## 2025-06-17 DIAGNOSIS — E11.22 CONTROLLED TYPE 2 DIABETES MELLITUS WITH STAGE 3 CHRONIC KIDNEY DISEASE, WITHOUT LONG-TERM CURRENT USE OF INSULIN (MULTI): ICD-10-CM

## 2025-06-17 DIAGNOSIS — I10 PRIMARY HYPERTENSION: ICD-10-CM

## 2025-06-17 ASSESSMENT — ENCOUNTER SYMPTOMS
SHORTNESS OF BREATH: 0
PALPITATIONS: 0
BLOOD IN STOOL: 0
TREMORS: 0
COUGH: 0
FLANK PAIN: 0
UNEXPECTED WEIGHT CHANGE: 0
FEVER: 0
NUMBNESS: 0
VOMITING: 0
HEADACHES: 0
LIGHT-HEADEDNESS: 1
FREQUENCY: 1
HEMATURIA: 0
NAUSEA: 0
SEIZURES: 0
ABDOMINAL PAIN: 0
DYSURIA: 0
WEAKNESS: 1
ARTHRALGIAS: 1
FATIGUE: 1

## 2025-06-18 ENCOUNTER — PATIENT OUTREACH (OUTPATIENT)
Dept: PRIMARY CARE | Facility: CLINIC | Age: 70
End: 2025-06-18
Payer: MEDICARE

## 2025-06-18 RX ORDER — METFORMIN HYDROCHLORIDE 1000 MG/1
1000 TABLET ORAL 2 TIMES DAILY
Qty: 180 TABLET | Refills: 3 | Status: SHIPPED | OUTPATIENT
Start: 2025-06-18

## 2025-06-18 RX ORDER — SPIRONOLACTONE 25 MG/1
25 TABLET ORAL DAILY
Qty: 90 TABLET | Refills: 3 | Status: SHIPPED | OUTPATIENT
Start: 2025-06-18

## 2025-06-18 NOTE — PROGRESS NOTES
Unable to reach patient for follow up call after recent hospitalization.   Left voicemail with call back number for patient to call if needed   If no voicemail available call attempts x 2 were made to contact the patient to assist with any questions or concerns patient may have.    Danish Elizondo LPN

## 2025-07-01 ENCOUNTER — PATIENT OUTREACH (OUTPATIENT)
Dept: PRIMARY CARE | Facility: CLINIC | Age: 70
End: 2025-07-01
Payer: MEDICARE

## 2025-07-01 NOTE — PROGRESS NOTES
Unable to reach patient for one month discharge follow up call.   Left voicemail with call back number for patient to call if needed   If no voicemail available call attempts x 2 were made to contact the patient to assist with any questions or concerns patient may have.    Danish Elizondo LPN

## 2025-07-09 ENCOUNTER — APPOINTMENT (OUTPATIENT)
Dept: PRIMARY CARE | Facility: CLINIC | Age: 70
End: 2025-07-09
Payer: MEDICARE

## 2025-07-09 LAB
ALBUMIN SERPL-MCNC: 4 G/DL (ref 3.6–5.1)
ALBUMIN/CREAT UR: NORMAL MG/G CREAT
ALP SERPL-CCNC: 76 U/L (ref 35–144)
ALT SERPL-CCNC: 17 U/L (ref 9–46)
ANION GAP SERPL CALCULATED.4IONS-SCNC: 8 MMOL/L (CALC) (ref 7–17)
AST SERPL-CCNC: 16 U/L (ref 10–35)
BILIRUB SERPL-MCNC: 0.4 MG/DL (ref 0.2–1.2)
BUN SERPL-MCNC: 23 MG/DL (ref 7–25)
CALCIUM SERPL-MCNC: 9 MG/DL (ref 8.6–10.3)
CHLORIDE SERPL-SCNC: 106 MMOL/L (ref 98–110)
CO2 SERPL-SCNC: 23 MMOL/L (ref 20–32)
CREAT SERPL-MCNC: 1.34 MG/DL (ref 0.7–1.28)
CREAT UR-MCNC: 81 MG/DL (ref 20–320)
EGFRCR SERPLBLD CKD-EPI 2021: 56 ML/MIN/1.73M2
EST. AVERAGE GLUCOSE BLD GHB EST-MCNC: 140 MG/DL
EST. AVERAGE GLUCOSE BLD GHB EST-SCNC: 7.7 MMOL/L
GLUCOSE SERPL-MCNC: 130 MG/DL (ref 65–99)
HBA1C MFR BLD: 6.5 %
MICROALBUMIN UR-MCNC: <0.2 MG/DL
POTASSIUM SERPL-SCNC: 5.4 MMOL/L (ref 3.5–5.3)
PROT SERPL-MCNC: 6.9 G/DL (ref 6.1–8.1)
SODIUM SERPL-SCNC: 137 MMOL/L (ref 135–146)

## 2025-07-10 DIAGNOSIS — K21.9 GASTROESOPHAGEAL REFLUX DISEASE, UNSPECIFIED WHETHER ESOPHAGITIS PRESENT: ICD-10-CM

## 2025-07-11 ENCOUNTER — OFFICE VISIT (OUTPATIENT)
Dept: PRIMARY CARE | Facility: CLINIC | Age: 70
End: 2025-07-11
Payer: MEDICARE

## 2025-07-11 VITALS
TEMPERATURE: 96.8 F | HEIGHT: 68 IN | OXYGEN SATURATION: 97 % | SYSTOLIC BLOOD PRESSURE: 118 MMHG | DIASTOLIC BLOOD PRESSURE: 64 MMHG | BODY MASS INDEX: 40.92 KG/M2 | RESPIRATION RATE: 16 BRPM | WEIGHT: 270 LBS | HEART RATE: 69 BPM

## 2025-07-11 DIAGNOSIS — I48.0 PAROXYSMAL ATRIAL FIBRILLATION WITH RVR (MULTI): ICD-10-CM

## 2025-07-11 DIAGNOSIS — E11.9 TYPE 2 DIABETES MELLITUS WITHOUT COMPLICATION, WITHOUT LONG-TERM CURRENT USE OF INSULIN: Primary | ICD-10-CM

## 2025-07-11 DIAGNOSIS — N18.31 CHRONIC KIDNEY DISEASE, STAGE 3A (MULTI): ICD-10-CM

## 2025-07-11 DIAGNOSIS — E78.00 HYPERCHOLESTEROLEMIA: ICD-10-CM

## 2025-07-11 DIAGNOSIS — I10 PRIMARY HYPERTENSION: ICD-10-CM

## 2025-07-11 PROCEDURE — 3078F DIAST BP <80 MM HG: CPT | Performed by: FAMILY MEDICINE

## 2025-07-11 PROCEDURE — 99214 OFFICE O/P EST MOD 30 MIN: CPT | Performed by: FAMILY MEDICINE

## 2025-07-11 PROCEDURE — 1159F MED LIST DOCD IN RCRD: CPT | Performed by: FAMILY MEDICINE

## 2025-07-11 PROCEDURE — 4010F ACE/ARB THERAPY RXD/TAKEN: CPT | Performed by: FAMILY MEDICINE

## 2025-07-11 PROCEDURE — 1036F TOBACCO NON-USER: CPT | Performed by: FAMILY MEDICINE

## 2025-07-11 PROCEDURE — 3044F HG A1C LEVEL LT 7.0%: CPT | Performed by: FAMILY MEDICINE

## 2025-07-11 PROCEDURE — 3008F BODY MASS INDEX DOCD: CPT | Performed by: FAMILY MEDICINE

## 2025-07-11 PROCEDURE — 1160F RVW MEDS BY RX/DR IN RCRD: CPT | Performed by: FAMILY MEDICINE

## 2025-07-11 PROCEDURE — 3074F SYST BP LT 130 MM HG: CPT | Performed by: FAMILY MEDICINE

## 2025-07-11 RX ORDER — OXYBUTYNIN CHLORIDE 5 MG/1
5 TABLET, EXTENDED RELEASE ORAL DAILY
COMMUNITY
Start: 2025-07-10

## 2025-07-11 RX ORDER — DILTIAZEM HYDROCHLORIDE 180 MG/1
180 CAPSULE, COATED, EXTENDED RELEASE ORAL DAILY
COMMUNITY
Start: 2025-07-09

## 2025-07-11 RX ORDER — DIGOXIN 125 MCG
125 TABLET ORAL DAILY
COMMUNITY
Start: 2025-06-26

## 2025-07-11 RX ORDER — OMEPRAZOLE 40 MG/1
40 CAPSULE, DELAYED RELEASE ORAL
Qty: 90 CAPSULE | Refills: 3 | Status: SHIPPED | OUTPATIENT
Start: 2025-07-11

## 2025-07-11 NOTE — PROGRESS NOTES
"Subjective   Patient ID: Johnathan Akins is a 70 y.o. male who presents for Diabetes Mellitus (1 month follow up ).  HPI    Review of Systems    Objective   /64 (BP Location: Right arm, Patient Position: Sitting, BP Cuff Size: Large adult)   Pulse 69   Temp 36 °C (96.8 °F) (Temporal)   Resp 16   Ht 1.727 m (5' 8\")   Wt 122 kg (270 lb)   SpO2 97%   BMI 41.05 kg/m²         Component  Ref Range & Units 3 d ago   GLUCOSE  65 - 99 mg/dL 130 High    Comment:               Fasting reference interval     For someone without known diabetes, a glucose  value >125 mg/dL indicates that they may have  diabetes and this should be confirmed with a  follow-up test.      UREA NITROGEN (BUN)  7 - 25 mg/dL 23   CREATININE  0.70 - 1.28 mg/dL 1.34 High    EGFR  > OR = 60 mL/min/1.73m2 56 Low    SODIUM  135 - 146 mmol/L 137   POTASSIUM  3.5 - 5.3 mmol/L 5.4 High    CHLORIDE  98 - 110 mmol/L 106   CARBON DIOXIDE  20 - 32 mmol/L 23   ELECTROLYTE BALANCE  7 - 17 mmol/L (calc) 8   CALCIUM  8.6 - 10.3 mg/dL 9.0   PROTEIN, TOTAL  6.1 - 8.1 g/dL 6.9   ALBUMIN  3.6 - 5.1 g/dL 4.0   BILIRUBIN, TOTAL  0.2 - 1.2 mg/dL 0.4   ALKALINE PHOSPHATASE  35 - 144 U/L 76   AST  10 - 35 U/L 16   ALT  9 - 46 U/L 17   Resulting Agency [facility]              Narrative  Performed by: [facility]  FASTING:YES    FASTING: YES   Specimen Collected: 07/08/25 08:15 Last Resulted: 07/09/25 02:41     Lab Results   Component Value Date    HGBA1C 6.5 (H) 07/08/2025         Component  Ref Range & Units 3 d ago   CREATININE, RANDOM URINE  20 - 320 mg/dL 81   ALBUMIN, URINE  See Note: mg/dL <0.2   Comment: Reference Range:    Reference Range  Not established   ALBUMIN/CREATININE RATIO, RANDOM URINE  <30 mg/g creat NOTE   Comment: NOTE: The urine albumin value is less than  0.2 mg/dL therefore we are unable to calculate  excretion and/or creatinine ratio.     The ADA defines abnormalities in " albumin  excretion as follows:     Albuminuria Category        Result (mg/g creatinine)     Normal to Mildly increased   <30  Moderately increased           Severely increased           > OR = 300     The ADA recommends that at least two of three  specimens collected within a 3-6 month period be  abnormal before considering a patient to be  within a diagnostic category.   Resulting Agency [facility]             Specimen Collected: 07/08/25 08:17 Last Resulted: 07/09/25 16:58     Physical Exam        Assessment/Plan   Problem List Items Addressed This Visit    None     creatinine ratio.     The ADA defines abnormalities in albumin  excretion as follows:     Albuminuria Category        Result (mg/g creatinine)     Normal to Mildly increased   <30  Moderately increased           Severely increased           > OR = 300     The ADA recommends that at least two of three  specimens collected within a 3-6 month period be  abnormal before considering a patient to be  within a diagnostic category.   Resulting Agency [facility]             Specimen Collected: 07/08/25 08:17 Last Resulted: 07/09/25 16:58   Test Result Released: No (inaccessible in [facility])    0 Result Notes       1  Topic            Component  Ref Range & Units 1 mo ago 11 mo ago 1 yr ago 2 yr ago 3 yr ago 4 yr ago 5 yr ago   Hemoglobin A1C  See comment % 6.1 High  7.1 High  R 6.5 High  R 6.4 Abnormal  R, CM 6.2 Abnormal  R, CM 6.2 R, CM 5.9 R, CM   Estimated Average Glucose  Not Established mg/dL 128            Prostate Specific Antigen  Order: [id number]   Status: Final result    Test Result Released: No (inaccessible in [facility])    0 Result Notes      Component  Ref Range & Units 4 mo ago   PSA, TOTAL  < OR = 4.00 ng/mL 0.47   Comment: The total PSA value from this assay system is  st       Contains abnormal data Basic Metabolic Panel  Order: [id number]   Status: Final result    Test Result Released: No (inaccessible in [facility])    0 Result Notes            Component  Ref Range & Units 1 mo ago  (6/2/25) 1 mo ago  (6/1/25) 1 mo ago  (6/1/25) 10 mo ago  (9/5/24) 11 mo ago  (8/17/24) 11 mo ago  (8/11/24) 11 mo ago  (8/10/24)   Glucose  74 - 99 mg/dL 109 High   166 High  111 High  139 High  139 High  133 High    Sodium  136 - 145 mmol/L 134 Low   133 Low  134 Low  133 Low  133 Low  130 Low    Potassium  3.5 - 5.3 mmol/L 5.0 5.5 High  5.5 High  5.1 4.7 4.6 4.4   Chloride  98 - 107 mmol/L 104  104 104 103 102 101   Bicarbonate  21 - 32 mmol/L 23  22 22 21 24 20 Low    Anion  Gap  10 - 20 mmol/L 12  13 13 14 12 13   Urea Nitrogen  6 - 23 mg/dL 24 High   40 High  24 High  25 High  21 20   Creatinine  0.50 - 1.30 mg/dL 1.40 High   2.47 High  1.39 High  1.44 High  1.24 1.33 High    eGFR  >60 mL/min/1.73m*2 53 Low   27 Low  CM 54 Low  CM 52 Low  CM 62 CM 57 Low  CM   Comment: Calculati        Physical Exam  Vital signs reviewed and normal  General inspection reveals an obese individual in no acute distress  Neck neck is supple no mass adenopathy bruits rigidity  Cardiovascular controlled ventricular rate of irregular irregularity  Pulmonary mildly reduced breath sounds at bases otherwise no wheezing rales or rhonchi  Abdominal abdomen obese without organomegaly mass or rebound no CVA tenderness no suprapubic fullness bowel sounds are normal  Peripheral vascular distal pulses are intact very minimally reduced vibration otherwise no other sensory or motor or vascular deficits.  No pretibial edema  Neuro cranial nerves are intact no new focal deficit upper or lower extremities  Mood mood is stable doubt anxiety depressive or cognitive issues  Orthopedic-minimal tenderness medial aspect of both knees and somewhat in the medial SI joints but otherwise no active synovitis the upper and lower extremities or muscle tenderness 9 did review recent diabetic lab and renal lab      Assessment/Plan   Problem List Items Addressed This Visit    None  The serum creatinine improving that is a good sign but still with 3A chronic kidney disease will reduce metformin to just once a day at bedtime he will continue his prostate medicine in the form of Flomax and Hytrin  New Lanoxin has been nicely controlling his heart rate but will continue cardiology as ordered diltiazem and beta-blocker  Continue lisinopril and also Aldactone certainly increasing liquids will recheck in about 6 weeks to 8 weeks his kidney functions to follow-up kidney as well as potassium  Maintain weight loss low-salt low carbohydrate low-fat  diet maintain P.  As reviewed  Diabetic goals diabetic diet diabetic medicines reviewed will continue above glipizide she is stable if difficulty losing weight may be well a good candidate for GLP-1 agonist-recheck the also with the BMP before next visit  continue current medications~  diet- decrease carbohydrates and sugar intake~  increase exercise as tolerated~  stay up to date with eye and foot exams~  call if any questions or concerns~follow up in 3-6 months~

## 2025-07-11 NOTE — TELEPHONE ENCOUNTER
Rx Refill Request     Name: Johnathan Aknis  :  1955   Medication Name:  omeprazole (PriLOSEC) 40 mg DR capsule   Specific Pharmacy location:  [facility] - [place], [place]   Date of last appointment:  2025   Date of next appointment:  2025   Best number to reach patient:  [phone]

## 2025-07-15 PROBLEM — K92.1 HEMATOCHEZIA: Status: RESOLVED | Noted: 2023-09-17 | Resolved: 2025-07-15

## 2025-07-15 PROBLEM — N18.31 CHRONIC KIDNEY DISEASE, STAGE 3A (MULTI): Status: ACTIVE | Noted: 2025-07-15

## 2025-07-15 ASSESSMENT — ENCOUNTER SYMPTOMS
ADENOPATHY: 0
UNEXPECTED WEIGHT CHANGE: 0
NAUSEA: 0
RECTAL PAIN: 0
CHEST TIGHTNESS: 0
FLANK PAIN: 0
SLEEP DISTURBANCE: 0
SINUS PRESSURE: 0
NUMBNESS: 0
DYSPHORIC MOOD: 0
DYSURIA: 0
FATIGUE: 0
RHINORRHEA: 1
HEADACHES: 0
FREQUENCY: 1
HEMATURIA: 0
FEVER: 0
SHORTNESS OF BREATH: 0
BLOOD IN STOOL: 0
LIGHT-HEADEDNESS: 1
PALPITATIONS: 0
WHEEZING: 0
ABDOMINAL PAIN: 0
WEAKNESS: 0
ARTHRALGIAS: 1
VOMITING: 0
MYALGIAS: 1
COUGH: 0
CONFUSION: 0
SINUS PAIN: 0

## 2025-08-19 DIAGNOSIS — N39.46 MIXED STRESS AND URGE URINARY INCONTINENCE: ICD-10-CM

## 2025-08-19 DIAGNOSIS — R33.9 RETENTION OF URINE: ICD-10-CM

## 2025-08-19 RX ORDER — OXYBUTYNIN CHLORIDE 10 MG/1
10 TABLET, EXTENDED RELEASE ORAL NIGHTLY
Qty: 90 TABLET | Refills: 3 | Status: SHIPPED | OUTPATIENT
Start: 2025-08-19 | End: 2025-08-20 | Stop reason: SDUPTHER

## 2025-08-20 DIAGNOSIS — R33.9 RETENTION OF URINE: ICD-10-CM

## 2025-08-20 DIAGNOSIS — E11.9 TYPE 2 DIABETES MELLITUS WITHOUT COMPLICATION, WITHOUT LONG-TERM CURRENT USE OF INSULIN: ICD-10-CM

## 2025-08-20 DIAGNOSIS — N39.46 MIXED STRESS AND URGE URINARY INCONTINENCE: ICD-10-CM

## 2025-08-20 RX ORDER — IBUPROFEN 200 MG
1 CAPSULE ORAL 2 TIMES DAILY
Qty: 100 STRIP | Refills: 3 | Status: SHIPPED | OUTPATIENT
Start: 2025-08-20

## 2025-08-20 RX ORDER — OXYBUTYNIN CHLORIDE 10 MG/1
10 TABLET, EXTENDED RELEASE ORAL NIGHTLY
Qty: 90 TABLET | Refills: 3 | Status: SHIPPED | OUTPATIENT
Start: 2025-08-20 | End: 2026-08-15

## 2025-08-29 ENCOUNTER — PATIENT OUTREACH (OUTPATIENT)
Dept: PRIMARY CARE | Facility: CLINIC | Age: 70
End: 2025-08-29
Payer: MEDICARE

## 2025-09-22 ENCOUNTER — APPOINTMENT (OUTPATIENT)
Dept: PRIMARY CARE | Facility: CLINIC | Age: 70
End: 2025-09-22
Payer: MEDICARE

## 2026-02-24 ENCOUNTER — APPOINTMENT (OUTPATIENT)
Dept: UROLOGY | Facility: CLINIC | Age: 71
End: 2026-02-24
Payer: MEDICARE